# Patient Record
Sex: FEMALE | Race: WHITE | Employment: OTHER | URBAN - METROPOLITAN AREA
[De-identification: names, ages, dates, MRNs, and addresses within clinical notes are randomized per-mention and may not be internally consistent; named-entity substitution may affect disease eponyms.]

---

## 2018-02-15 ENCOUNTER — HOSPITAL ENCOUNTER (OUTPATIENT)
Dept: PREADMISSION TESTING | Age: 53
Discharge: HOME | End: 2018-02-19
Payer: COMMERCIAL

## 2018-02-15 VITALS
TEMPERATURE: 97.9 F | HEART RATE: 92 BPM | WEIGHT: 293 LBS | BODY MASS INDEX: 48.82 KG/M2 | RESPIRATION RATE: 16 BRPM | HEIGHT: 65 IN | DIASTOLIC BLOOD PRESSURE: 79 MMHG | OXYGEN SATURATION: 93 % | SYSTOLIC BLOOD PRESSURE: 149 MMHG

## 2018-02-15 DIAGNOSIS — I10 ESSENTIAL HYPERTENSION: Chronic | ICD-10-CM

## 2018-02-15 DIAGNOSIS — R10.2 PELVIC PAIN IN FEMALE: Chronic | ICD-10-CM

## 2018-02-15 LAB
ABO/RH: NORMAL
ANION GAP SERPL CALCULATED.3IONS-SCNC: 11 MEQ/L (ref 7–13)
ANTIBODY SCREEN: NORMAL
BUN BLDV-MCNC: 9 MG/DL (ref 6–20)
CALCIUM SERPL-MCNC: 9 MG/DL (ref 8.6–10.2)
CHLORIDE BLD-SCNC: 102 MEQ/L (ref 98–107)
CO2: 32 MEQ/L (ref 22–29)
CREAT SERPL-MCNC: 0.8 MG/DL (ref 0.5–0.9)
EKG ATRIAL RATE: 85 BPM
EKG P AXIS: 42 DEGREES
EKG P-R INTERVAL: 160 MS
EKG Q-T INTERVAL: 388 MS
EKG QRS DURATION: 86 MS
EKG QTC CALCULATION (BAZETT): 461 MS
EKG R AXIS: -21 DEGREES
EKG T AXIS: 8 DEGREES
EKG VENTRICULAR RATE: 85 BPM
GFR AFRICAN AMERICAN: >60
GFR NON-AFRICAN AMERICAN: >60
GLUCOSE BLD-MCNC: 82 MG/DL (ref 74–109)
HCT VFR BLD CALC: 49.3 % (ref 37–47)
HEMOGLOBIN: 16 G/DL (ref 12–16)
MCH RBC QN AUTO: 31.2 PG (ref 27–31.3)
MCHC RBC AUTO-ENTMCNC: 32.4 % (ref 33–37)
MCV RBC AUTO: 96.1 FL (ref 82–100)
PDW BLD-RTO: 16.1 % (ref 11.5–14.5)
PLATELET # BLD: 188 K/UL (ref 130–400)
POTASSIUM SERPL-SCNC: 5.1 MEQ/L (ref 3.5–5.1)
RBC # BLD: 5.13 M/UL (ref 4.2–5.4)
SODIUM BLD-SCNC: 145 MEQ/L (ref 132–144)
WBC # BLD: 8.7 K/UL (ref 4.8–10.8)

## 2018-02-15 PROCEDURE — 86850 RBC ANTIBODY SCREEN: CPT

## 2018-02-15 PROCEDURE — 86900 BLOOD TYPING SEROLOGIC ABO: CPT

## 2018-02-15 PROCEDURE — 80048 BASIC METABOLIC PNL TOTAL CA: CPT

## 2018-02-15 PROCEDURE — 86901 BLOOD TYPING SEROLOGIC RH(D): CPT

## 2018-02-15 PROCEDURE — 85027 COMPLETE CBC AUTOMATED: CPT

## 2018-02-15 PROCEDURE — 93005 ELECTROCARDIOGRAM TRACING: CPT

## 2018-02-15 RX ORDER — SODIUM CHLORIDE 0.9 % (FLUSH) 0.9 %
10 SYRINGE (ML) INJECTION PRN
Status: CANCELLED | OUTPATIENT
Start: 2018-02-15

## 2018-02-15 RX ORDER — SODIUM CHLORIDE 0.9 % (FLUSH) 0.9 %
10 SYRINGE (ML) INJECTION EVERY 12 HOURS SCHEDULED
Status: CANCELLED | OUTPATIENT
Start: 2018-02-15

## 2018-02-15 RX ORDER — SODIUM CHLORIDE, SODIUM LACTATE, POTASSIUM CHLORIDE, CALCIUM CHLORIDE 600; 310; 30; 20 MG/100ML; MG/100ML; MG/100ML; MG/100ML
INJECTION, SOLUTION INTRAVENOUS CONTINUOUS
Status: CANCELLED | OUTPATIENT
Start: 2018-02-15

## 2018-02-15 RX ORDER — LIDOCAINE HYDROCHLORIDE 10 MG/ML
1 INJECTION, SOLUTION EPIDURAL; INFILTRATION; INTRACAUDAL; PERINEURAL
Status: CANCELLED | OUTPATIENT
Start: 2018-02-15 | End: 2018-02-15

## 2018-02-15 ASSESSMENT — ENCOUNTER SYMPTOMS
EYES NEGATIVE: 1
DOUBLE VISION: 0
COUGH: 0
BACK PAIN: 0
CONSTIPATION: 0
BLURRED VISION: 0
WHEEZING: 1
HEARTBURN: 0
SORE THROAT: 0
EYE PAIN: 0
STRIDOR: 0
SHORTNESS OF BREATH: 1
DIARRHEA: 0
NAUSEA: 0

## 2018-02-15 NOTE — H&P
difficult to palpate posterior tibial pulses. Dorsalis pedis and radial pulses 2+. Pulmonary/Chest: Effort normal and breath sounds normal. No respiratory distress. Abdominal: Soft. Bowel sounds are normal. There is tenderness (lower left and upper right quadrants). Musculoskeletal: Normal range of motion. She exhibits no edema. Lymphadenopathy:     She has no cervical adenopathy. Neurological: She is alert and oriented to person, place, and time. Skin: Skin is warm and dry. No erythema. Psychiatric: She has a normal mood and affect. Her behavior is normal.   Vitals reviewed.       Assessment:  Pelvic pain    Plan:  Excision of vaginal mesh, cystoscopy    Makayla Victor NP  2/15/2018

## 2018-02-16 PROCEDURE — 93010 ELECTROCARDIOGRAM REPORT: CPT | Performed by: INTERNAL MEDICINE

## 2018-02-19 ENCOUNTER — ANESTHESIA (OUTPATIENT)
Dept: OPERATING ROOM | Age: 53
End: 2018-02-19
Payer: COMMERCIAL

## 2018-02-19 ENCOUNTER — HOSPITAL ENCOUNTER (OUTPATIENT)
Age: 53
Setting detail: OUTPATIENT SURGERY
Discharge: HOME | End: 2018-02-19
Attending: OBSTETRICS & GYNECOLOGY | Admitting: OBSTETRICS & GYNECOLOGY
Payer: COMMERCIAL

## 2018-02-19 ENCOUNTER — ANESTHESIA EVENT (OUTPATIENT)
Dept: OPERATING ROOM | Age: 53
End: 2018-02-19
Payer: COMMERCIAL

## 2018-02-19 VITALS
TEMPERATURE: 97 F | DIASTOLIC BLOOD PRESSURE: 72 MMHG | RESPIRATION RATE: 20 BRPM | SYSTOLIC BLOOD PRESSURE: 139 MMHG | OXYGEN SATURATION: 97 % | HEART RATE: 88 BPM

## 2018-02-19 VITALS — SYSTOLIC BLOOD PRESSURE: 183 MMHG | TEMPERATURE: 97.3 F | DIASTOLIC BLOOD PRESSURE: 104 MMHG | OXYGEN SATURATION: 93 %

## 2018-02-19 DIAGNOSIS — G89.18 POSTOPERATIVE PAIN: Primary | ICD-10-CM

## 2018-02-19 PROCEDURE — 6360000002 HC RX W HCPCS: Performed by: NURSE ANESTHETIST, CERTIFIED REGISTERED

## 2018-02-19 PROCEDURE — 3600000014 HC SURGERY LEVEL 4 ADDTL 15MIN: Performed by: OBSTETRICS & GYNECOLOGY

## 2018-02-19 PROCEDURE — 3600000004 HC SURGERY LEVEL 4 BASE: Performed by: OBSTETRICS & GYNECOLOGY

## 2018-02-19 PROCEDURE — 88305 TISSUE EXAM BY PATHOLOGIST: CPT

## 2018-02-19 PROCEDURE — 2500000003 HC RX 250 WO HCPCS: Performed by: NURSE ANESTHETIST, CERTIFIED REGISTERED

## 2018-02-19 PROCEDURE — 7100000010 HC PHASE II RECOVERY - FIRST 15 MIN: Performed by: OBSTETRICS & GYNECOLOGY

## 2018-02-19 PROCEDURE — 3700000000 HC ANESTHESIA ATTENDED CARE: Performed by: OBSTETRICS & GYNECOLOGY

## 2018-02-19 PROCEDURE — 2580000003 HC RX 258: Performed by: NURSE ANESTHETIST, CERTIFIED REGISTERED

## 2018-02-19 PROCEDURE — 3700000001 HC ADD 15 MINUTES (ANESTHESIA): Performed by: OBSTETRICS & GYNECOLOGY

## 2018-02-19 PROCEDURE — 2500000003 HC RX 250 WO HCPCS: Performed by: STUDENT IN AN ORGANIZED HEALTH CARE EDUCATION/TRAINING PROGRAM

## 2018-02-19 PROCEDURE — 7100000000 HC PACU RECOVERY - FIRST 15 MIN: Performed by: OBSTETRICS & GYNECOLOGY

## 2018-02-19 PROCEDURE — 6360000002 HC RX W HCPCS: Performed by: OBSTETRICS & GYNECOLOGY

## 2018-02-19 PROCEDURE — 2580000003 HC RX 258: Performed by: STUDENT IN AN ORGANIZED HEALTH CARE EDUCATION/TRAINING PROGRAM

## 2018-02-19 PROCEDURE — 7100000011 HC PHASE II RECOVERY - ADDTL 15 MIN: Performed by: OBSTETRICS & GYNECOLOGY

## 2018-02-19 PROCEDURE — 2580000003 HC RX 258: Performed by: OBSTETRICS & GYNECOLOGY

## 2018-02-19 PROCEDURE — 6370000000 HC RX 637 (ALT 250 FOR IP): Performed by: OBSTETRICS & GYNECOLOGY

## 2018-02-19 PROCEDURE — 7100000001 HC PACU RECOVERY - ADDTL 15 MIN: Performed by: OBSTETRICS & GYNECOLOGY

## 2018-02-19 PROCEDURE — 6370000000 HC RX 637 (ALT 250 FOR IP): Performed by: NURSE ANESTHETIST, CERTIFIED REGISTERED

## 2018-02-19 PROCEDURE — 2780000010 HC IMPLANT OTHER: Performed by: OBSTETRICS & GYNECOLOGY

## 2018-02-19 RX ORDER — SODIUM CHLORIDE, SODIUM LACTATE, POTASSIUM CHLORIDE, CALCIUM CHLORIDE 600; 310; 30; 20 MG/100ML; MG/100ML; MG/100ML; MG/100ML
INJECTION, SOLUTION INTRAVENOUS CONTINUOUS
Status: DISCONTINUED | OUTPATIENT
Start: 2018-02-19 | End: 2018-02-19 | Stop reason: HOSPADM

## 2018-02-19 RX ORDER — LIDOCAINE HYDROCHLORIDE 10 MG/ML
1 INJECTION, SOLUTION EPIDURAL; INFILTRATION; INTRACAUDAL; PERINEURAL
Status: DISCONTINUED | OUTPATIENT
Start: 2018-02-19 | End: 2018-02-19 | Stop reason: HOSPADM

## 2018-02-19 RX ORDER — FENTANYL CITRATE 50 UG/ML
INJECTION, SOLUTION INTRAMUSCULAR; INTRAVENOUS PRN
Status: DISCONTINUED | OUTPATIENT
Start: 2018-02-19 | End: 2018-02-19 | Stop reason: SDUPTHER

## 2018-02-19 RX ORDER — ROCURONIUM BROMIDE 10 MG/ML
INJECTION, SOLUTION INTRAVENOUS PRN
Status: DISCONTINUED | OUTPATIENT
Start: 2018-02-19 | End: 2018-02-19

## 2018-02-19 RX ORDER — ONDANSETRON 2 MG/ML
4 INJECTION INTRAMUSCULAR; INTRAVENOUS
Status: DISCONTINUED | OUTPATIENT
Start: 2018-02-19 | End: 2018-02-19 | Stop reason: HOSPADM

## 2018-02-19 RX ORDER — SODIUM CHLORIDE 0.9 % (FLUSH) 0.9 %
10 SYRINGE (ML) INJECTION EVERY 12 HOURS SCHEDULED
Status: DISCONTINUED | OUTPATIENT
Start: 2018-02-19 | End: 2018-02-19 | Stop reason: HOSPADM

## 2018-02-19 RX ORDER — ONDANSETRON 2 MG/ML
INJECTION INTRAMUSCULAR; INTRAVENOUS PRN
Status: DISCONTINUED | OUTPATIENT
Start: 2018-02-19 | End: 2018-02-19 | Stop reason: SDUPTHER

## 2018-02-19 RX ORDER — MEPERIDINE HYDROCHLORIDE 25 MG/ML
12.5 INJECTION INTRAMUSCULAR; INTRAVENOUS; SUBCUTANEOUS EVERY 5 MIN PRN
Status: DISCONTINUED | OUTPATIENT
Start: 2018-02-19 | End: 2018-02-19 | Stop reason: HOSPADM

## 2018-02-19 RX ORDER — DOCUSATE SODIUM 100 MG/1
100 CAPSULE, LIQUID FILLED ORAL 2 TIMES DAILY
Status: DISCONTINUED | OUTPATIENT
Start: 2018-02-19 | End: 2018-02-19 | Stop reason: HOSPADM

## 2018-02-19 RX ORDER — KETOROLAC TROMETHAMINE 30 MG/ML
30 INJECTION, SOLUTION INTRAMUSCULAR; INTRAVENOUS EVERY 6 HOURS
Status: DISCONTINUED | OUTPATIENT
Start: 2018-02-19 | End: 2018-02-19 | Stop reason: HOSPADM

## 2018-02-19 RX ORDER — ONDANSETRON 2 MG/ML
4 INJECTION INTRAMUSCULAR; INTRAVENOUS EVERY 8 HOURS PRN
Status: DISCONTINUED | OUTPATIENT
Start: 2018-02-19 | End: 2018-02-19 | Stop reason: HOSPADM

## 2018-02-19 RX ORDER — DEXAMETHASONE SODIUM PHOSPHATE 10 MG/ML
INJECTION INTRAMUSCULAR; INTRAVENOUS PRN
Status: DISCONTINUED | OUTPATIENT
Start: 2018-02-19 | End: 2018-02-19 | Stop reason: SDUPTHER

## 2018-02-19 RX ORDER — FENTANYL CITRATE 50 UG/ML
50 INJECTION, SOLUTION INTRAMUSCULAR; INTRAVENOUS EVERY 10 MIN PRN
Status: DISCONTINUED | OUTPATIENT
Start: 2018-02-19 | End: 2018-02-19 | Stop reason: HOSPADM

## 2018-02-19 RX ORDER — MAGNESIUM HYDROXIDE 1200 MG/15ML
LIQUID ORAL CONTINUOUS PRN
Status: DISCONTINUED | OUTPATIENT
Start: 2018-02-19 | End: 2018-02-19 | Stop reason: HOSPADM

## 2018-02-19 RX ORDER — HYDROCODONE BITARTRATE AND ACETAMINOPHEN 5; 325 MG/1; MG/1
1 TABLET ORAL PRN
Status: DISCONTINUED | OUTPATIENT
Start: 2018-02-19 | End: 2018-02-19 | Stop reason: HOSPADM

## 2018-02-19 RX ORDER — SODIUM CHLORIDE 0.9 % (FLUSH) 0.9 %
10 SYRINGE (ML) INJECTION PRN
Status: DISCONTINUED | OUTPATIENT
Start: 2018-02-19 | End: 2018-02-19 | Stop reason: HOSPADM

## 2018-02-19 RX ORDER — ALBUTEROL SULFATE 90 UG/1
AEROSOL, METERED RESPIRATORY (INHALATION) PRN
Status: DISCONTINUED | OUTPATIENT
Start: 2018-02-19 | End: 2018-02-19 | Stop reason: SDUPTHER

## 2018-02-19 RX ORDER — MIDAZOLAM HYDROCHLORIDE 1 MG/ML
INJECTION INTRAMUSCULAR; INTRAVENOUS PRN
Status: DISCONTINUED | OUTPATIENT
Start: 2018-02-19 | End: 2018-02-19 | Stop reason: SDUPTHER

## 2018-02-19 RX ORDER — HYDROCODONE BITARTRATE AND ACETAMINOPHEN 5; 325 MG/1; MG/1
2 TABLET ORAL PRN
Status: DISCONTINUED | OUTPATIENT
Start: 2018-02-19 | End: 2018-02-19 | Stop reason: HOSPADM

## 2018-02-19 RX ORDER — LIDOCAINE HYDROCHLORIDE 10 MG/ML
1 INJECTION, SOLUTION EPIDURAL; INFILTRATION; INTRACAUDAL; PERINEURAL
Status: COMPLETED | OUTPATIENT
Start: 2018-02-19 | End: 2018-02-19

## 2018-02-19 RX ORDER — ROCURONIUM BROMIDE 10 MG/ML
INJECTION, SOLUTION INTRAVENOUS PRN
Status: DISCONTINUED | OUTPATIENT
Start: 2018-02-19 | End: 2018-02-19 | Stop reason: SDUPTHER

## 2018-02-19 RX ORDER — DIPHENHYDRAMINE HYDROCHLORIDE 50 MG/ML
12.5 INJECTION INTRAMUSCULAR; INTRAVENOUS
Status: DISCONTINUED | OUTPATIENT
Start: 2018-02-19 | End: 2018-02-19 | Stop reason: HOSPADM

## 2018-02-19 RX ORDER — OXYCODONE HYDROCHLORIDE AND ACETAMINOPHEN 5; 325 MG/1; MG/1
1 TABLET ORAL EVERY 6 HOURS PRN
Qty: 12 TABLET | Refills: 0 | Status: SHIPPED | OUTPATIENT
Start: 2018-02-19 | End: 2022-02-24 | Stop reason: SDUPTHER

## 2018-02-19 RX ORDER — IBUPROFEN 400 MG/1
400 TABLET ORAL EVERY 6 HOURS PRN
Status: DISCONTINUED | OUTPATIENT
Start: 2018-02-19 | End: 2018-02-19 | Stop reason: HOSPADM

## 2018-02-19 RX ORDER — SUCCINYLCHOLINE/SOD CL,ISO/PF 100 MG/5ML
SYRINGE (ML) INTRAVENOUS PRN
Status: DISCONTINUED | OUTPATIENT
Start: 2018-02-19 | End: 2018-02-19 | Stop reason: SDUPTHER

## 2018-02-19 RX ORDER — SODIUM CHLORIDE, SODIUM LACTATE, POTASSIUM CHLORIDE, CALCIUM CHLORIDE 600; 310; 30; 20 MG/100ML; MG/100ML; MG/100ML; MG/100ML
INJECTION, SOLUTION INTRAVENOUS CONTINUOUS PRN
Status: DISCONTINUED | OUTPATIENT
Start: 2018-02-19 | End: 2018-02-19 | Stop reason: SDUPTHER

## 2018-02-19 RX ORDER — METOCLOPRAMIDE HYDROCHLORIDE 5 MG/ML
10 INJECTION INTRAMUSCULAR; INTRAVENOUS
Status: DISCONTINUED | OUTPATIENT
Start: 2018-02-19 | End: 2018-02-19 | Stop reason: HOSPADM

## 2018-02-19 RX ORDER — MAGNESIUM HYDROXIDE 1200 MG/15ML
LIQUID ORAL PRN
Status: DISCONTINUED | OUTPATIENT
Start: 2018-02-19 | End: 2018-02-19 | Stop reason: HOSPADM

## 2018-02-19 RX ORDER — PROPOFOL 10 MG/ML
INJECTION, EMULSION INTRAVENOUS PRN
Status: DISCONTINUED | OUTPATIENT
Start: 2018-02-19 | End: 2018-02-19 | Stop reason: SDUPTHER

## 2018-02-19 RX ADMIN — FENTANYL CITRATE 100 MCG: 50 INJECTION, SOLUTION INTRAMUSCULAR; INTRAVENOUS at 08:19

## 2018-02-19 RX ADMIN — SODIUM CHLORIDE, POTASSIUM CHLORIDE, SODIUM LACTATE AND CALCIUM CHLORIDE: 600; 310; 30; 20 INJECTION, SOLUTION INTRAVENOUS at 06:36

## 2018-02-19 RX ADMIN — ONDANSETRON 4 MG: 2 INJECTION INTRAMUSCULAR; INTRAVENOUS at 08:45

## 2018-02-19 RX ADMIN — PROPOFOL 200 MG: 10 INJECTION, EMULSION INTRAVENOUS at 08:19

## 2018-02-19 RX ADMIN — Medication 180 MG: at 08:19

## 2018-02-19 RX ADMIN — MIDAZOLAM HYDROCHLORIDE 0.5 MG: 1 INJECTION, SOLUTION INTRAMUSCULAR; INTRAVENOUS at 08:10

## 2018-02-19 RX ADMIN — DEXAMETHASONE SODIUM PHOSPHATE 5 MG: 10 INJECTION INTRAMUSCULAR; INTRAVENOUS at 08:25

## 2018-02-19 RX ADMIN — LIDOCAINE HYDROCHLORIDE 100 MG: 20 INJECTION, SOLUTION INTRAVENOUS at 08:19

## 2018-02-19 RX ADMIN — Medication 3 G: at 08:15

## 2018-02-19 RX ADMIN — MIDAZOLAM HYDROCHLORIDE 1 MG: 1 INJECTION, SOLUTION INTRAMUSCULAR; INTRAVENOUS at 07:28

## 2018-02-19 RX ADMIN — ROCURONIUM BROMIDE 10 MG: 10 INJECTION INTRAVENOUS at 08:33

## 2018-02-19 RX ADMIN — MIDAZOLAM HYDROCHLORIDE 0.5 MG: 1 INJECTION, SOLUTION INTRAMUSCULAR; INTRAVENOUS at 07:55

## 2018-02-19 RX ADMIN — ALBUTEROL SULFATE 6 PUFF: 90 AEROSOL, METERED RESPIRATORY (INHALATION) at 08:31

## 2018-02-19 RX ADMIN — PHENYLEPHRINE HYDROCHLORIDE 100 MCG: 10 INJECTION INTRAVENOUS at 08:42

## 2018-02-19 RX ADMIN — SODIUM CHLORIDE, POTASSIUM CHLORIDE, SODIUM LACTATE AND CALCIUM CHLORIDE: 600; 310; 30; 20 INJECTION, SOLUTION INTRAVENOUS at 07:14

## 2018-02-19 ASSESSMENT — PULMONARY FUNCTION TESTS
PIF_VALUE: 35
PIF_VALUE: 40
PIF_VALUE: 40
PIF_VALUE: 1
PIF_VALUE: 35
PIF_VALUE: 2
PIF_VALUE: 40
PIF_VALUE: 35
PIF_VALUE: 40
PIF_VALUE: 40
PIF_VALUE: 14
PIF_VALUE: 35
PIF_VALUE: 1
PIF_VALUE: 35
PIF_VALUE: 17
PIF_VALUE: 42
PIF_VALUE: 35
PIF_VALUE: 40
PIF_VALUE: 2
PIF_VALUE: 40
PIF_VALUE: 17
PIF_VALUE: 6
PIF_VALUE: 40
PIF_VALUE: 35
PIF_VALUE: 17
PIF_VALUE: 1
PIF_VALUE: 39
PIF_VALUE: 1
PIF_VALUE: 4
PIF_VALUE: 1
PIF_VALUE: 40
PIF_VALUE: 7
PIF_VALUE: 40
PIF_VALUE: 40
PIF_VALUE: 17
PIF_VALUE: 40
PIF_VALUE: 8
PIF_VALUE: 27
PIF_VALUE: 40
PIF_VALUE: 39
PIF_VALUE: 28
PIF_VALUE: 40
PIF_VALUE: 2
PIF_VALUE: 35
PIF_VALUE: 5
PIF_VALUE: 35
PIF_VALUE: 17
PIF_VALUE: 3
PIF_VALUE: 18
PIF_VALUE: 40
PIF_VALUE: 8
PIF_VALUE: 2
PIF_VALUE: 17
PIF_VALUE: 17
PIF_VALUE: 4

## 2018-02-19 ASSESSMENT — PAIN SCALES - GENERAL
PAINLEVEL_OUTOF10: 0
PAINLEVEL_OUTOF10: 0

## 2018-02-19 ASSESSMENT — COPD QUESTIONNAIRES: CAT_SEVERITY: NO INTERVAL CHANGE

## 2018-02-19 ASSESSMENT — PAIN DESCRIPTION - PAIN TYPE: TYPE: REFERRED PAIN

## 2018-02-19 ASSESSMENT — PAIN - FUNCTIONAL ASSESSMENT: PAIN_FUNCTIONAL_ASSESSMENT: 0-10

## 2018-02-19 NOTE — OP NOTE
a normal  bladder. No evidence of any injury. The patient was allowed to emerge  from anesthesia and was taken to the PACU in good condition. She tolerated  the surgery and the anesthesia well. Blood loss minimal.  Complications  none.       Jannie Mobley DO    D: 02/19/2018 9:44:48       T: 02/19/2018 11:48:16     [email or web address]  Job#: [id number]     Doc#: [id number]    CC:

## 2018-02-19 NOTE — PROGRESS NOTES
Received from or into pacu on a cart accompanied in by Efren Bryant crna. O2 on at 8L mask, SAO2 90%. Expiratory wheeze noted to anterior auscultation. Pt encouraged to take deep breaths and cough. Skin pink, warm and dry. MP RSR. Abdomen soft, clean peripad placed.

## 2018-02-19 NOTE — PROGRESS NOTES
Pt tolerating po ice chips. Faint expiratory wheeze auscultated anteriorly, otherwise breath sounds clear following pt's dry cough. Continued encouragement to deep breathe provided. Pt instructed to use incentive spirometer, attaining inspored volume of 500cc.

## 2018-02-19 NOTE — PROGRESS NOTES
Abdomen soft, peripad clean. SAO2 96% on room air. Pt using incentive spirometer. Denies dyspnea or pain Tolerating ice chips.

## 2018-02-19 NOTE — ANESTHESIA PRE PROCEDURE
I10       Past Medical History:        Diagnosis Date    Anxiety     Asthma     [facility]     Chronic back pain     COPD [facility]ch[facility] obstructive pulmonary disease) [facility]     Depression     Hypertension     Obesity     Pelvic pain in female 2/15/2018       Past Surgical History:        Procedure Laterality Date    BLADDER SUSPENSION  02/2014    CARPAL TUNNEL RELEASE  89 or 90    right hand     HYSTERECTOMY  2009    full    TUBAL LIGATION      27 yrs ago       Social History:    Social History   Substance Use Topics    Smoking status: Current Every Day Smoker     Packs/day: 1.00     Types: Cigarettes     Start date: 5/6/1979    Smokeless tobacco: Never Used    Alcohol use No                                Ready to quit: Not Answered  Counseling given: Not Answered      Vital Signs (Current):   Vitals:    02/19/18 0613   BP: 136/82   Pulse: 93   Resp: 20   Temp: 98.8 °F (37.1 °C)   TempSrc: Temporal                                              BP Readings from Last 3 Encounters:   02/19/18 136/82   02/15/18 (!) 149/79   03/26/14 130/90       NPO Status: Time of last liquid consumption: 2330                        Time of last solid consumption: 2200                        Date of last liquid consumption: 02/18/18                        Date of last solid food consumption: 02/18/18    BMI:   Wt Readings from Last 3 Encounters:   02/15/18 (!) 341 lb 6.4 oz (154.9 kg)   03/26/14 (!) 308 lb (139.7 kg)   01/23/14 (!) 310 lb 3.2 oz (140.7 kg)     There is no height or weight on file to calculate BMI.    CBC:   Lab Results   Component Value Date    WBC 8.7 02/15/2018    RBC 5.13 02/15/2018    HGB 16.0 02/15/2018    HCT 49.3 02/15/2018    MCV 96.1 02/15/2018    RDW 16.1 02/15/2018     02/15/2018       CMP:   Lab Results   Component Value Date     02/15/2018    K 5.1 02/15/2018     02/15/2018    CO2 32 02/15/2018    BUN 9 02/15/2018    CREATININE 0.80 02/15/2018

## 2018-12-04 ENCOUNTER — APPOINTMENT (OUTPATIENT)
Dept: GENERAL RADIOLOGY | Age: 53
End: 2018-12-04
Payer: MEDICARE

## 2018-12-04 ENCOUNTER — HOSPITAL ENCOUNTER (EMERGENCY)
Age: 53
Discharge: HOME | End: 2018-12-04
Attending: EMERGENCY MEDICINE
Payer: MEDICARE

## 2018-12-04 VITALS
DIASTOLIC BLOOD PRESSURE: 92 MMHG | WEIGHT: 293 LBS | HEART RATE: 89 BPM | RESPIRATION RATE: 18 BRPM | OXYGEN SATURATION: 95 % | BODY MASS INDEX: 48.82 KG/M2 | TEMPERATURE: 97.5 F | HEIGHT: 65 IN | SYSTOLIC BLOOD PRESSURE: 162 MMHG

## 2018-12-04 DIAGNOSIS — J18.9 COMMUNITY ACQUIRED PNEUMONIA OF LEFT LUNG, UNSPECIFIED PART OF LUNG: Primary | ICD-10-CM

## 2018-12-04 PROCEDURE — 71045 X-RAY EXAM CHEST 1 VIEW: CPT

## 2018-12-04 PROCEDURE — 99283 EMERGENCY DEPT VISIT LOW MDM: CPT

## 2018-12-04 PROCEDURE — 96372 THER/PROPH/DIAG INJ SC/IM: CPT

## 2018-12-04 PROCEDURE — 6370000000 HC RX 637 (ALT 250 FOR IP): Performed by: EMERGENCY MEDICINE

## 2018-12-04 PROCEDURE — 6360000002 HC RX W HCPCS: Performed by: EMERGENCY MEDICINE

## 2018-12-04 RX ORDER — CEFTRIAXONE 500 MG/1
500 INJECTION, POWDER, FOR SOLUTION INTRAMUSCULAR; INTRAVENOUS ONCE
Status: COMPLETED | OUTPATIENT
Start: 2018-12-04 | End: 2018-12-04

## 2018-12-04 RX ORDER — PREDNISONE 50 MG/1
50 TABLET ORAL DAILY
Qty: 4 TABLET | Refills: 0 | Status: SHIPPED | OUTPATIENT
Start: 2018-12-04 | End: 2018-12-08

## 2018-12-04 RX ORDER — GUAIFENESIN AND CODEINE PHOSPHATE 100; 10 MG/5ML; MG/5ML
10 SOLUTION ORAL EVERY 6 HOURS PRN
Qty: 120 ML | Refills: 0 | Status: SHIPPED | OUTPATIENT
Start: 2018-12-04 | End: 2018-12-11

## 2018-12-04 RX ORDER — KETOROLAC TROMETHAMINE 30 MG/ML
30 INJECTION, SOLUTION INTRAMUSCULAR; INTRAVENOUS ONCE
Status: COMPLETED | OUTPATIENT
Start: 2018-12-04 | End: 2018-12-04

## 2018-12-04 RX ORDER — AZITHROMYCIN 250 MG/1
TABLET, FILM COATED ORAL
Qty: 1 PACKET | Refills: 0 | Status: SHIPPED | OUTPATIENT
Start: 2018-12-04 | End: 2020-01-24

## 2018-12-04 RX ORDER — PREDNISONE 20 MG/1
60 TABLET ORAL ONCE
Status: COMPLETED | OUTPATIENT
Start: 2018-12-04 | End: 2018-12-04

## 2018-12-04 RX ADMIN — KETOROLAC TROMETHAMINE 30 MG: 30 INJECTION, SOLUTION INTRAMUSCULAR at 18:26

## 2018-12-04 RX ADMIN — CEFTRIAXONE SODIUM 500 MG: 500 INJECTION, POWDER, FOR SOLUTION INTRAMUSCULAR; INTRAVENOUS at 19:02

## 2018-12-04 RX ADMIN — PREDNISONE 60 MG: 20 TABLET ORAL at 18:25

## 2018-12-04 ASSESSMENT — PAIN DESCRIPTION - PAIN TYPE: TYPE: ACUTE PAIN

## 2018-12-04 ASSESSMENT — ENCOUNTER SYMPTOMS
COUGH: 1
VOMITING: 0
SHORTNESS OF BREATH: 0

## 2018-12-04 ASSESSMENT — PAIN DESCRIPTION - DESCRIPTORS: DESCRIPTORS: ACHING

## 2018-12-04 ASSESSMENT — PAIN SCALES - GENERAL: PAINLEVEL_OUTOF10: 8

## 2018-12-04 ASSESSMENT — PAIN DESCRIPTION - LOCATION: LOCATION: RIB CAGE

## 2018-12-04 ASSESSMENT — PAIN DESCRIPTION - ORIENTATION: ORIENTATION: LEFT

## 2020-01-24 ENCOUNTER — OFFICE VISIT (OUTPATIENT)
Dept: FAMILY MEDICINE CLINIC | Age: 55
End: 2020-01-24
Payer: MEDICARE

## 2020-01-24 VITALS
OXYGEN SATURATION: 92 % | TEMPERATURE: 98.1 F | SYSTOLIC BLOOD PRESSURE: 114 MMHG | HEIGHT: 65 IN | DIASTOLIC BLOOD PRESSURE: 76 MMHG | HEART RATE: 88 BPM | BODY MASS INDEX: 48.82 KG/M2 | RESPIRATION RATE: 16 BRPM | WEIGHT: 293 LBS

## 2020-01-24 DIAGNOSIS — E68 SEQUELAE OF HYPERALIMENTATION: ICD-10-CM

## 2020-01-24 DIAGNOSIS — R06.02 SOB (SHORTNESS OF BREATH) ON EXERTION: ICD-10-CM

## 2020-01-24 DIAGNOSIS — I10 ESSENTIAL HYPERTENSION: Chronic | ICD-10-CM

## 2020-01-24 DIAGNOSIS — L29.9 PRURITUS, UNSPECIFIED: ICD-10-CM

## 2020-01-24 DIAGNOSIS — R63.5 WEIGHT GAIN: ICD-10-CM

## 2020-01-24 DIAGNOSIS — I25.10 CORONARY ARTERY DISEASE INVOLVING NATIVE CORONARY ARTERY OF NATIVE HEART, ANGINA PRESENCE UNSPECIFIED: ICD-10-CM

## 2020-01-24 DIAGNOSIS — I51.7 LVH (LEFT VENTRICULAR HYPERTROPHY): ICD-10-CM

## 2020-01-24 DIAGNOSIS — R79.9 ABNORMAL FINDING OF BLOOD CHEMISTRY, UNSPECIFIED: ICD-10-CM

## 2020-01-24 LAB
ALBUMIN SERPL-MCNC: 4 G/DL (ref 3.5–4.6)
ALP BLD-CCNC: 116 U/L (ref 40–130)
ALT SERPL-CCNC: 11 U/L (ref 0–33)
ANION GAP SERPL CALCULATED.3IONS-SCNC: 13 MEQ/L (ref 9–15)
AST SERPL-CCNC: 12 U/L (ref 0–35)
BILIRUB SERPL-MCNC: 0.4 MG/DL (ref 0.2–0.7)
BUN BLDV-MCNC: 11 MG/DL (ref 6–20)
CALCIUM SERPL-MCNC: 9.1 MG/DL (ref 8.5–9.9)
CHLORIDE BLD-SCNC: 99 MEQ/L (ref 95–107)
CHOLESTEROL, TOTAL: 181 MG/DL (ref 0–199)
CO2: 29 MEQ/L (ref 20–31)
CREAT SERPL-MCNC: 0.94 MG/DL (ref 0.5–0.9)
GFR AFRICAN AMERICAN: >60
GFR NON-AFRICAN AMERICAN: >60
GLOBULIN: 3.3 G/DL (ref 2.3–3.5)
GLUCOSE BLD-MCNC: 83 MG/DL (ref 70–99)
HBA1C MFR BLD: 5.6 % (ref 4.8–5.9)
HCT VFR BLD CALC: 49.3 % (ref 37–47)
HDLC SERPL-MCNC: 54 MG/DL (ref 40–59)
HEMOGLOBIN: 16.1 G/DL (ref 12–16)
LDL CHOLESTEROL CALCULATED: 99 MG/DL (ref 0–129)
MCH RBC QN AUTO: 31.1 PG (ref 27–31.3)
MCHC RBC AUTO-ENTMCNC: 32.6 % (ref 33–37)
MCV RBC AUTO: 95.3 FL (ref 82–100)
PDW BLD-RTO: 16.4 % (ref 11.5–14.5)
PLATELET # BLD: 221 K/UL (ref 130–400)
POTASSIUM SERPL-SCNC: 4.4 MEQ/L (ref 3.4–4.9)
PRO-BNP: 31 PG/ML
RBC # BLD: 5.17 M/UL (ref 4.2–5.4)
SODIUM BLD-SCNC: 141 MEQ/L (ref 135–144)
TOTAL PROTEIN: 7.3 G/DL (ref 6.3–8)
TRIGL SERPL-MCNC: 141 MG/DL (ref 0–150)
TSH REFLEX: 3.15 UIU/ML (ref 0.44–3.86)
VITAMIN D 25-HYDROXY: 21.7 NG/ML (ref 30–100)
WBC # BLD: 8.1 K/UL (ref 4.8–10.8)

## 2020-01-24 PROCEDURE — 99204 OFFICE O/P NEW MOD 45 MIN: CPT | Performed by: PHYSICIAN ASSISTANT

## 2020-01-24 PROCEDURE — G8431 POS CLIN DEPRES SCRN F/U DOC: HCPCS | Performed by: PHYSICIAN ASSISTANT

## 2020-01-24 PROCEDURE — 93000 ELECTROCARDIOGRAM COMPLETE: CPT | Performed by: PHYSICIAN ASSISTANT

## 2020-01-24 PROCEDURE — G0444 DEPRESSION SCREEN ANNUAL: HCPCS | Performed by: PHYSICIAN ASSISTANT

## 2020-01-24 RX ORDER — NYSTATIN 100000 [USP'U]/G
POWDER TOPICAL
Qty: 60 G | Refills: 2 | Status: SHIPPED | OUTPATIENT
Start: 2020-01-24 | End: 2020-06-19 | Stop reason: SDUPTHER

## 2020-01-24 RX ORDER — NYSTATIN 100000 [USP'U]/G
POWDER TOPICAL
Qty: 60 G | Refills: 2 | Status: SHIPPED | OUTPATIENT
Start: 2020-01-24 | End: 2020-01-24 | Stop reason: SDUPTHER

## 2020-01-24 ASSESSMENT — PATIENT HEALTH QUESTIONNAIRE - PHQ9
SUM OF ALL RESPONSES TO PHQ QUESTIONS 1-9: 12
1. LITTLE INTEREST OR PLEASURE IN DOING THINGS: 1
9. THOUGHTS THAT YOU WOULD BE BETTER OFF DEAD, OR OF HURTING YOURSELF: 0
8. MOVING OR SPEAKING SO SLOWLY THAT OTHER PEOPLE COULD HAVE NOTICED. OR THE OPPOSITE, BEING SO FIGETY OR RESTLESS THAT YOU HAVE BEEN MOVING AROUND A LOT MORE THAN USUAL: 0
SUM OF ALL RESPONSES TO PHQ9 QUESTIONS 1 & 2: 4
6. FEELING BAD ABOUT YOURSELF - OR THAT YOU ARE A FAILURE OR HAVE LET YOURSELF OR YOUR FAMILY DOWN: 1
5. POOR APPETITE OR OVEREATING: 1
SUM OF ALL RESPONSES TO PHQ QUESTIONS 1-9: 12
3. TROUBLE FALLING OR STAYING ASLEEP: 2
4. FEELING TIRED OR HAVING LITTLE ENERGY: 3
10. IF YOU CHECKED OFF ANY PROBLEMS, HOW DIFFICULT HAVE THESE PROBLEMS MADE IT FOR YOU TO DO YOUR WORK, TAKE CARE OF THINGS AT HOME, OR GET ALONG WITH OTHER PEOPLE: 1
2. FEELING DOWN, DEPRESSED OR HOPELESS: 3
7. TROUBLE CONCENTRATING ON THINGS, SUCH AS READING THE NEWSPAPER OR WATCHING TELEVISION: 1

## 2020-01-24 NOTE — PROGRESS NOTES
Subjective  Mckenzie Chowdary, 49 y.o. female presents today with:  Chief Complaint   Patient presents with   [employer] Establish Care    Leg Swelling     2 weeks, a little better today     Rash     1 week  under breasts      HPI  In the office to establish care. Previous PCP: [facility]; Dr. Alin Lopez    COPD. No inhalers at this time. Increased cough/SOB, +WHITE. Tobacco: 1.5 PPD x 30+ year history. Quit twice in the past. Interested in quitting. Obesity. Was set to have gastric bypass; [facility]. Surgery was delayed/cancelled due to acutely ill son with special needs. Never followed up; did not reschedule. Would eventually like to be reevaluated. CAD/HTN/CHF. Previous cardiologist: Dr. Georges Fischer. Has not since followed up with cardiology. +CP, +WHITE, +SOB, +weight gain, +BLE. Anxiety and depression. Acute on chronic. Long history of anxiety/depression. Reports worsening depression recently due to current living situation. Boyfriend is living with patient and her adoptive son. Feels like her boyfriend is living there for \"convinence\". Rash. Chronic, recurrent rash of breast folds. +moist, erythema, pruritis. Has not applied anything to skin. Review of Systems   Constitutional: Positive for activity change, fatigue and unexpected weight change (up and down). Negative for appetite change, chills, diaphoresis and fever. HENT: Positive for postnasal drip. Negative for congestion, ear pain, tinnitus and trouble swallowing. Eyes: Positive for visual disturbance. Respiratory: Positive for cough, chest tightness and shortness of breath. Negative for apnea, choking and wheezing. Cardiovascular: Positive for leg swelling. Negative for chest pain and palpitations. Gastrointestinal: Positive for constipation. Negative for blood in stool and nausea. Endocrine: Positive for polyuria. Negative for polydipsia and polyphagia. Genitourinary: Negative for pelvic pain.    Musculoskeletal: service: Not on file     Active member of club or organization: Not on file     Attends meetings of clubs or organizations: Not on file     Relationship status: Not on file    Intimate partner violence:     Fear of current or ex partner: Not on file     Emotionally abused: Not on file     Physically abused: Not on file     Forced sexual activity: Not on file   Other Topics Concern    Not on file   Social History Narrative    Not on file     Family History   Problem Relation Age of Onset    Alzheimer's Disease Father     High Blood Pressure Father     Stroke Father         11 strokes in one year    Asthma Sister     Asthma Brother     No Known Problems Daughter     No Known Problems Daughter         Allergies   Allergen Reactions    Food Hives     Patient still eats strawberries    Other Rash     grass     Current Outpatient Medications   Medication Sig Dispense Refill    nystatin (MYCOSTATIN) 032527 UNIT/GM powder Apply 3 times daily. 60 g 2     No current facility-administered medications for this visit. PMH, Surgical Hx, Family Hx, and Social Hx reviewed and updated. Health Maintenance reviewed. Objective  Vitals:    01/24/20 1308   BP: 114/76   Site: Left Upper Arm   Position: Sitting   Cuff Size: Large Adult   Pulse: 88   Resp: 16   Temp: 98.1 °F (36.7 °C)   TempSrc: Tympanic   SpO2: 92%   Weight: (!) 341 lb 6.4 oz (154.9 kg)   Height: 5' 5\" (1.651 m)     BP Readings from Last 3 Encounters:   01/24/20 114/76   12/04/18 (!) 162/92   02/15/18 (!) 149/79     Wt Readings from Last 3 Encounters:   01/24/20 (!) 341 lb 6.4 oz (154.9 kg)   12/04/18 (!) 332 lb (150.6 kg)   02/15/18 (!) 341 lb 6.4 oz (154.9 kg)     Physical Exam  Constitutional:       General: She is not in acute distress. Appearance: She is obese. She is not ill-appearing, toxic-appearing or diaphoretic. HENT:      Head: Normocephalic and atraumatic.       Right Ear: Tympanic membrane, ear canal and external ear normal. There Essential hypertension  CBC    Comprehensive Metabolic Panel    EKG 12 lead    EKG 12 lead   4. KRAISSA (obstructive sleep apnea)     5. LVH (left ventricular hypertrophy)  CBC    Comprehensive Metabolic Panel    Brain Monique Huffman MD, Cardiology, [place]    EKG 12 lead    EKG 12 lead   6. Diastolic dysfunction  Patrizia Fernandez MD, Cardiology, [place]   7. Tobacco abuse     8. SOB (shortness of breath) on exertion  Brain Natriuretic Peptide    EKG 12 lead    EKG 12 lead   9. Coronary artery disease involving native coronary artery of native heart, angina presence unspecified  CBC    Comprehensive Metabolic Panel    Lipid Panel    EKG 12 lead    EKG 12 lead   10. Breast cancer screening  [facility] DIGITAL SCREEN W CAD BILATERAL   11. Colon cancer screening  Cologuard (For External Results Only)   12. Candida infection  nystatin (MYCOSTATIN) 678244 UNIT/GM powder    DISCONTINUED: nystatin (MYCOSTATIN) 252798 UNIT/GM powder   13. Pruritus, unspecified   TSH with Reflex   14. Encounter for screening mammogram for breast cancer  [facility] DIGITAL SCREEN W CAD BILATERAL   15. Weight gain  TSH with Reflex    Vitamin D 25 Hydroxy    Hemoglobin A1C   16. Sequelae of hyperalimentation   Vitamin D 25 Hydroxy   17. Abnormal finding of blood chemistry, unspecified   Hemoglobin A1C   18. Lower extremity edema     19. Tobacco abuse counseling     2 week follow up with me. Orders Placed This Encounter   Procedures    Cologuard (For External Results Only)     This test is performed by an external laboratory and is used for result attachment only. It is required that this order requisition be faxed to: [facility] @ [phone]. See [email or web address] for further information.      Standing Status:   Future     Standing Expiration Date:   1/24/2021    [facility] DIGITAL SCREEN W CAD BILATERAL     Standing Status:   Future     Standing Expiration Date:   1/23/2021     Order Specific (ZITHROMAX Z-ROSALINA) 250 MG tablet LIST CLEANUP    mometasone-formoterol (DULERA) 100-5 MCG/ACT inhaler LIST CLEANUP    beclomethasone (QVAR) 80 MCG/ACT inhaler LIST CLEANUP    conjugated estrogens (PREMARIN) 0.625 MG/GM vaginal cream LIST CLEANUP    metoprolol (LOPRESSOR) 25 MG tablet LIST CLEANUP    furosemide (LASIX) 40 MG tablet LIST CLEANUP    potassium chloride SA (K-DUR;KLOR-CON M) 20 MEQ tablet LIST CLEANUP    pravastatin (PRAVACHOL) 20 MG tablet LIST CLEANUP    omeprazole (PRILOSEC) 20 MG capsule LIST CLEANUP    albuterol (PROAIR HFA) 108 (90 BASE) MCG/ACT inhaler LIST CLEANUP    nystatin (MYCOSTATIN) 543861 UNIT/GM powder REORDER     No follow-ups on file. Reviewed with the patient: current clinical status,medications, activities and diet. Side effects, adverse effects of themedication prescribed today, as well as treatment plan/ rationale and result expectations have been discussed with the patient who expresses understanding and desires to proceed. Close follow up to evaluate treatment results and for coordination of care. I have reviewed the patient's medical history in detail and updated the computerized patient record.      Zoie Ervin PA-C

## 2020-01-29 PROBLEM — I25.10 CORONARY ARTERY DISEASE INVOLVING NATIVE CORONARY ARTERY OF NATIVE HEART: Status: ACTIVE | Noted: 2020-01-29

## 2020-01-29 PROBLEM — R63.5 WEIGHT GAIN: Status: ACTIVE | Noted: 2020-01-29

## 2020-01-29 PROBLEM — R10.2 PELVIC PAIN IN FEMALE: Chronic | Status: RESOLVED | Noted: 2018-02-15 | Resolved: 2020-01-29

## 2020-01-29 PROBLEM — B37.9 CANDIDA INFECTION: Status: ACTIVE | Noted: 2020-01-29

## 2020-01-29 PROBLEM — L29.9 PRURITUS, UNSPECIFIED: Status: ACTIVE | Noted: 2020-01-29

## 2020-01-29 ASSESSMENT — ENCOUNTER SYMPTOMS
BLOOD IN STOOL: 0
NAUSEA: 0
TROUBLE SWALLOWING: 0
SHORTNESS OF BREATH: 1
CHEST TIGHTNESS: 1
BACK PAIN: 1
WHEEZING: 0
CONSTIPATION: 1
APNEA: 0
COUGH: 1
CHOKING: 0

## 2020-01-31 ENCOUNTER — OFFICE VISIT (OUTPATIENT)
Dept: FAMILY MEDICINE CLINIC | Age: 55
End: 2020-01-31
Payer: MEDICARE

## 2020-01-31 VITALS
BODY MASS INDEX: 48.82 KG/M2 | HEIGHT: 65 IN | HEART RATE: 85 BPM | WEIGHT: 293 LBS | TEMPERATURE: 97.9 F | OXYGEN SATURATION: 90 % | RESPIRATION RATE: 16 BRPM

## 2020-01-31 VITALS
HEIGHT: 65 IN | WEIGHT: 293 LBS | HEART RATE: 85 BPM | BODY MASS INDEX: 48.82 KG/M2 | DIASTOLIC BLOOD PRESSURE: 68 MMHG | RESPIRATION RATE: 16 BRPM | SYSTOLIC BLOOD PRESSURE: 110 MMHG | OXYGEN SATURATION: 90 % | TEMPERATURE: 97.9 F

## 2020-01-31 PROBLEM — R45.89 DEPRESSED MOOD: Status: ACTIVE | Noted: 2020-01-31

## 2020-01-31 PROBLEM — M79.672 BILATERAL LEG AND FOOT PAIN: Status: ACTIVE | Noted: 2020-01-31

## 2020-01-31 PROBLEM — M79.604 BILATERAL LEG AND FOOT PAIN: Status: ACTIVE | Noted: 2020-01-31

## 2020-01-31 PROBLEM — E55.9 VITAMIN D DEFICIENCY: Status: ACTIVE | Noted: 2020-01-31

## 2020-01-31 PROBLEM — J42 CHRONIC BRONCHITIS (HCC): Status: ACTIVE | Noted: 2020-01-31

## 2020-01-31 PROBLEM — M79.605 BILATERAL LEG AND FOOT PAIN: Status: ACTIVE | Noted: 2020-01-31

## 2020-01-31 PROBLEM — M79.671 BILATERAL LEG AND FOOT PAIN: Status: ACTIVE | Noted: 2020-01-31

## 2020-01-31 PROCEDURE — G8431 POS CLIN DEPRES SCRN F/U DOC: HCPCS | Performed by: PHYSICIAN ASSISTANT

## 2020-01-31 PROCEDURE — 99214 OFFICE O/P EST MOD 30 MIN: CPT | Performed by: PHYSICIAN ASSISTANT

## 2020-01-31 PROCEDURE — G0438 PPPS, INITIAL VISIT: HCPCS | Performed by: PHYSICIAN ASSISTANT

## 2020-01-31 RX ORDER — ALBUTEROL SULFATE 90 UG/1
2 AEROSOL, METERED RESPIRATORY (INHALATION) 2 TIMES DAILY
Qty: 2 INHALER | Refills: 3 | Status: SHIPPED | OUTPATIENT
Start: 2020-01-31 | End: 2020-01-31 | Stop reason: SDUPTHER

## 2020-01-31 RX ORDER — ALBUTEROL SULFATE 90 UG/1
2 AEROSOL, METERED RESPIRATORY (INHALATION) 2 TIMES DAILY
Qty: 2 INHALER | Refills: 3 | Status: SHIPPED | OUTPATIENT
Start: 2020-01-31 | End: 2022-08-29 | Stop reason: SDUPTHER

## 2020-01-31 RX ORDER — ERGOCALCIFEROL 1.25 MG/1
50000 CAPSULE ORAL WEEKLY
Qty: 12 CAPSULE | Refills: 1 | Status: SHIPPED | OUTPATIENT
Start: 2020-01-31 | End: 2020-01-31 | Stop reason: SDUPTHER

## 2020-01-31 RX ORDER — NYSTATIN 100000 U/G
CREAM TOPICAL
Qty: 30 G | Refills: 2 | Status: SHIPPED | OUTPATIENT
Start: 2020-01-31 | End: 2020-01-31 | Stop reason: SDUPTHER

## 2020-01-31 RX ORDER — FUROSEMIDE 20 MG/1
20 TABLET ORAL DAILY
Qty: 90 TABLET | Refills: 1 | Status: SHIPPED | OUTPATIENT
Start: 2020-01-31 | End: 2020-05-28 | Stop reason: SDUPTHER

## 2020-01-31 RX ORDER — BUPROPION HYDROCHLORIDE 150 MG/1
150 TABLET ORAL EVERY MORNING
Qty: 30 TABLET | Refills: 3 | Status: SHIPPED | OUTPATIENT
Start: 2020-01-31 | End: 2020-05-22 | Stop reason: SDUPTHER

## 2020-01-31 RX ORDER — NYSTATIN 100000 U/G
CREAM TOPICAL
Qty: 30 G | Refills: 2 | Status: SHIPPED | OUTPATIENT
Start: 2020-01-31 | End: 2020-06-19 | Stop reason: SDUPTHER

## 2020-01-31 RX ORDER — ERGOCALCIFEROL 1.25 MG/1
50000 CAPSULE ORAL WEEKLY
Qty: 12 CAPSULE | Refills: 1 | Status: SHIPPED | OUTPATIENT
Start: 2020-01-31 | End: 2020-05-21

## 2020-01-31 RX ORDER — FUROSEMIDE 20 MG/1
20 TABLET ORAL DAILY
Qty: 90 TABLET | Refills: 1 | Status: SHIPPED | OUTPATIENT
Start: 2020-01-31 | End: 2020-01-31 | Stop reason: SDUPTHER

## 2020-01-31 RX ORDER — BUPROPION HYDROCHLORIDE 150 MG/1
150 TABLET ORAL EVERY MORNING
Qty: 30 TABLET | Refills: 3 | Status: SHIPPED | OUTPATIENT
Start: 2020-01-31 | End: 2020-01-31 | Stop reason: SDUPTHER

## 2020-01-31 ASSESSMENT — PATIENT HEALTH QUESTIONNAIRE - PHQ9: SUM OF ALL RESPONSES TO PHQ QUESTIONS 1-9: 14

## 2020-01-31 ASSESSMENT — ENCOUNTER SYMPTOMS
NAUSEA: 0
COUGH: 1
BLOOD IN STOOL: 0
TROUBLE SWALLOWING: 0
APNEA: 0
BACK PAIN: 1
WHEEZING: 0
CHEST TIGHTNESS: 1
CONSTIPATION: 1
CHOKING: 0
SHORTNESS OF BREATH: 0

## 2020-01-31 ASSESSMENT — LIFESTYLE VARIABLES: HOW OFTEN DO YOU HAVE A DRINK CONTAINING ALCOHOL: 0

## 2020-01-31 NOTE — PROGRESS NOTES
Medicare Annual Wellness Visit  Name: Stephanie Cedeño Date: 2020   MRN: [id number] Sex: Female   Age: 49 y.o. Ethnicity: Non-/Non    : 1965 Race: Emily Menard is here for Medicare AWV (patient is here today for her medicare annual wellness visit )    Screenings for behavioral, psychosocial and functional/safety risks, and cognitive dysfunction are all negative except as indicated below. These results, as well as other patient data from the [address] form, are documented in Flowsheets linked to this Encounter. Allergies   Allergen Reactions    Food Hives     Patient still eats strawberries    Other Rash     grass         Prior to Visit Medications    Medication Sig Taking? Authorizing Provider   nystatin (MYCOSTATIN) 199259 UNIT/GM powder Apply 3 times daily. Yes Zoie Ervin PA-C   nystatin (MYCOSTATIN) 170921 UNIT/GM cream Apply topically 2 times daily.   Zoie Ervin PA-C   buPROPion (WELLBUTRIN XL) 150 MG extended release tablet Take 1 tablet by mouth every morning  Zoie Ervin PA-C   furosemide (LASIX) 20 MG tablet Take 1 tablet by mouth daily  Zoie Ervin PA-C   vitamin D (ERGOCALCIFEROL) 1.25 MG (70327 UT) CAPS capsule Take 1 capsule by mouth once a week  Zoie Ervin PA-C   albuterol sulfate  (90 Base) MCG/ACT inhaler Inhale 2 puffs into the lungs 2 times daily  Luis Hyde PA-C         Past Medical History:   Diagnosis Date    Anxiety     Asthma     [facility]     Chronic back pain     COPD ([facility] obstructive pulmonary disease) ([facility] [address]     Depression     Hypertension     Obesity     Pelvic pain in female 2/15/2018       Past Surgical History:   Procedure Laterality Date    BLADDER SUSPENSION  2014    CARPAL TUNNEL RELEASE  89 or 90    right hand     HYSTERECTOMY      full    MS CMBND ANTERPOST COLPORRAPHY W/CYSTO W/NTRCL RPR N/A 2018    EXCISION OF VAGINAL MESH, CYSTOSCOPY performed by Monty Cummins DO at [facility]      27 yrs ago         Family History   Problem Relation Age of Onset    Alzheimer's Disease Father     High Blood Pressure Father     Stroke Father         11 strokes in one year    Asthma Sister     Asthma Brother     No Known Problems Daughter     No Known Problems Daughter        CareTeam (Including outside providers/suppliers regularly involved in providing care):   Patient Care Team:  Kortney Chatterjee PA-C as PCP - General (Family Medicine)  Kortney Chatterjee PA-C as PCP - [facility] Empaneled Provider    Wt Readings from Last 3 Encounters:   01/31/20 (!) 346 lb 9.6 oz (157.2 kg)   01/31/20 (!) 346 lb 14.4 oz (157.4 kg)   01/24/20 (!) 341 lb 6.4 oz (154.9 kg)     Vitals:    01/31/20 1130   BP: 110/68   Site: Left Upper Arm   Position: Sitting   Cuff Size: Large Adult   Pulse: 85   Resp: 16   Temp: 97.9 °F (36.6 °C)   TempSrc: Tympanic   SpO2: 90%   Weight: (!) 346 lb 9.6 oz (157.2 kg)   Height: 5' 5\" (1.651 m)     Body mass index is 57.68 kg/m². Based upon direct observation of the patient, evaluation of cognition reveals recent and remote memory intact. Patient's complete Health Risk Assessment and screening values have been reviewed and are found in Flowsheets. The following problems were reviewed today and where indicated follow up appointments were made and/or referrals ordered.     Positive Risk Factor Screenings with Interventions:     Substance Abuse:  Social History     Tobacco History     Smoking Status  Current Every Day Smoker Smoking Start Date  5/6/1979 Smoking Frequency  1 pack/day Smoking Tobacco Type  Cigarettes    Smokeless Tobacco Use  Never Used          Alcohol History     Alcohol Use Status  No          Drug Use     Drug Use Status  No          Sexual Activity     Sexually Active  Yes Partners  Male               Audit Questionnaire: Screen for Alcohol Misuse  How often do you have a drink containing alcohol?: Never  Substance Abuse

## 2020-01-31 NOTE — PROGRESS NOTES
CYSTOSCOPY performed by Dasha Almanza DO at [facility]      27 yrs ago     Social History     Socioeconomic History    Marital status: Legally      Spouse name: Not on file    Number of children: Not on file    Years of education: Not on file    Highest education level: Not on file   Occupational History    Not on file   Social Needs    Financial resource strain: Not on file    Food insecurity:     Worry: Not on file     Inability: Not on file    Transportation needs:     Medical: Not on file     Non-medical: Not on file   Tobacco Use    Smoking status: Current Every Day Smoker     Packs/day: 1.00     Types: Cigarettes     Start date: 5/6/1979    Smokeless tobacco: Never Used   Substance and Sexual Activity    Alcohol use: No    Drug use: No    Sexual activity: Yes     Partners: Male   Lifestyle    Physical activity:     Days per week: Not on file     Minutes per session: Not on file    Stress: Not on file   Relationships    Social connections:     Talks on phone: Not on file     Gets together: Not on file     Attends Christian service: Not on file     Active member of club or organization: Not on file     Attends meetings of clubs or organizations: Not on file     Relationship status: Not on file    Intimate partner violence:     Fear of current or ex partner: Not on file     Emotionally abused: Not on file     Physically abused: Not on file     Forced sexual activity: Not on file   Other Topics Concern    Not on file   Social History Narrative    Not on file     Family History   Problem Relation Age of Onset    Alzheimer's Disease Father     High Blood Pressure Father     Stroke Father         11 strokes in one year    Asthma Sister     Asthma Brother     No Known Problems Daughter     No Known Problems Daughter      Allergies   Allergen Reactions   [address]     Patient still eats strawberries    Other Rash     grass     Current Outpatient Medications Effort: Pulmonary effort is normal. No respiratory distress. Breath sounds: Normal breath sounds. No stridor. No wheezing or rhonchi. Abdominal:      Comments: Obese abdomen   Musculoskeletal:         General: Tenderness present. Right lower leg: Edema present. Left lower leg: Edema present. Skin:     General: Skin is warm and dry. Coloration: Skin is not jaundiced or pale. Findings: Rash present. No bruising or lesion. Neurological:      General: No focal deficit present. Mental Status: She is alert and oriented to person, place, and time. Psychiatric:         Attention and Perception: Attention normal.         Speech: Speech normal.         Behavior: Behavior normal.         Cognition and Memory: Cognition normal.         Judgment: Judgment normal.      Comments: No change from OV a week ago; open to starting antidepressant. Denies SI/HI. Assessment & Plan   Kaye Ames was seen today for results. Diagnoses and all orders for this visit:    BMI 50.0-59.9, adult ([facility] [address]  -     Insulin Free & Total; Future    Lower extremity edema  -     Jerry Salgado, PAULA, Interventional Radiology, [place]  -     Discontinue: furosemide (LASIX) 20 MG tablet; Take 1 tablet by mouth daily  -     furosemide (LASIX) 20 MG tablet; Take 1 tablet by mouth daily    Bilateral leg and foot pain  -     Evelia Salgado, NP, Interventional Radiology, [place]    Viridiana infection  -     Discontinue: nystatin (MYCOSTATIN) 068741 UNIT/GM cream; Apply topically 2 times daily. -     nystatin (MYCOSTATIN) 443717 UNIT/GM cream; Apply topically 2 times daily. Depressed mood  -     Discontinue: buPROPion (WELLBUTRIN XL) 150 MG extended release tablet; Take 1 tablet by mouth every morning  -     buPROPion (WELLBUTRIN XL) 150 MG extended release tablet;  Take 1 tablet by mouth every morning    Vitamin D deficiency  -     Discontinue: vitamin D (ERGOCALCIFEROL) 1.25 MG (05061 UT) CAPS capsule;

## 2020-01-31 NOTE — PATIENT INSTRUCTIONS
Personalized Preventive Plan for Maira Perdomo - 1/31/2020  Medicare offers a range of preventive health benefits. Some of the tests and screenings are paid in full while other may be subject to a deductible, co-insurance, and/or copay. Some of these benefits include a comprehensive review of your medical history including lifestyle, illnesses that may run in your family, and various assessments and screenings as appropriate. After reviewing your medical record and screening and assessments performed today your provider may have ordered immunizations, labs, imaging, and/or referrals for you. A list of these orders (if applicable) as well as your Preventive Care list are included within your After Visit Summary for your review. Other Preventive Recommendations:    · A preventive eye exam performed by an eye specialist is recommended every 1-2 years to screen for glaucoma; cataracts, macular degeneration, and other eye disorders. · A preventive dental visit is recommended every 6 months. · Try to get at least 150 minutes of exercise per week or 10,000 steps per day on a pedometer . · Order or download the FREE \"Exercise & Physical Activity: Your Everyday Guide\" from [employer]. Call 9-568.168.1508 or search [employer] online. · You need 5634-7202 mg of calcium and 3244-4283 IU of vitamin D per day. It is possible to meet your calcium requirement with diet alone, but a vitamin D supplement is usually necessary to meet this goal.  · When exposed to the sun, use a sunscreen that protects against both UVA and UVB radiation with an SPF of 30 or greater. Reapply every 2 to 3 hours or after sweating, drying off with a towel, or swimming. · Always wear a seat belt when traveling in a car. Always wear a helmet when riding a bicycle or motorcycle.

## 2020-02-02 LAB
INSULIN FREE: 17 UIU/ML (ref 3–19)
INSULIN: 22 UIU/ML (ref 3–19)

## 2020-02-05 ENCOUNTER — OFFICE VISIT (OUTPATIENT)
Dept: CARDIOLOGY CLINIC | Age: 55
End: 2020-02-05
Payer: MEDICARE

## 2020-02-05 VITALS
DIASTOLIC BLOOD PRESSURE: 86 MMHG | WEIGHT: 293 LBS | OXYGEN SATURATION: 88 % | RESPIRATION RATE: 20 BRPM | HEART RATE: 88 BPM | HEIGHT: 65 IN | BODY MASS INDEX: 48.82 KG/M2 | SYSTOLIC BLOOD PRESSURE: 124 MMHG

## 2020-02-05 PROBLEM — I10 ESSENTIAL HYPERTENSION: Status: ACTIVE | Noted: 2018-02-15

## 2020-02-05 PROBLEM — J44.9 CHRONIC OBSTRUCTIVE PULMONARY DISEASE (HCC): Status: ACTIVE | Noted: 2020-02-05

## 2020-02-05 PROBLEM — R53.81 MALAISE AND FATIGUE: Status: ACTIVE | Noted: 2020-02-05

## 2020-02-05 PROBLEM — R53.83 MALAISE AND FATIGUE: Status: ACTIVE | Noted: 2020-02-05

## 2020-02-05 PROBLEM — E78.2 MIXED HYPERLIPIDEMIA: Status: ACTIVE | Noted: 2020-02-05

## 2020-02-05 PROBLEM — R42 DIZZINESS AND GIDDINESS: Status: ACTIVE | Noted: 2020-02-05

## 2020-02-05 PROBLEM — M17.9 OSTEOARTHRITIS OF KNEE: Status: ACTIVE | Noted: 2020-02-05

## 2020-02-05 PROBLEM — I50.9 HEART FAILURE (HCC): Status: ACTIVE | Noted: 2020-02-05

## 2020-02-05 PROBLEM — N39.3 STRESS INCONTINENCE OF URINE: Status: ACTIVE | Noted: 2020-02-05

## 2020-02-05 PROBLEM — K21.9 GASTRO-ESOPHAGEAL REFLUX DISEASE WITHOUT ESOPHAGITIS: Status: ACTIVE | Noted: 2020-02-05

## 2020-02-05 PROBLEM — K29.00 ACUTE GASTRITIS: Status: ACTIVE | Noted: 2020-02-05

## 2020-02-05 PROBLEM — M54.50 LOW BACK PAIN: Status: ACTIVE | Noted: 2020-02-05

## 2020-02-05 PROBLEM — Z78.0 MENOPAUSE PRESENT: Status: ACTIVE | Noted: 2020-02-05

## 2020-02-05 PROBLEM — E11.9 TYPE 2 DIABETES MELLITUS WITHOUT COMPLICATION (HCC): Status: ACTIVE | Noted: 2020-02-05

## 2020-02-05 PROBLEM — F41.1 GENERALIZED ANXIETY DISORDER: Status: ACTIVE | Noted: 2020-02-05

## 2020-02-05 PROBLEM — D50.0 IRON DEFICIENCY ANEMIA DUE TO CHRONIC BLOOD LOSS: Status: ACTIVE | Noted: 2020-02-05

## 2020-02-05 PROBLEM — M50.90 CERVICAL DISC DISORDER: Status: ACTIVE | Noted: 2020-02-05

## 2020-02-05 PROCEDURE — 99204 OFFICE O/P NEW MOD 45 MIN: CPT | Performed by: INTERNAL MEDICINE

## 2020-02-05 RX ORDER — SPIRONOLACTONE 25 MG/1
25 TABLET ORAL DAILY
Qty: 90 TABLET | Refills: 3 | Status: SHIPPED | OUTPATIENT
Start: 2020-02-05 | End: 2020-05-28

## 2020-02-05 ASSESSMENT — ENCOUNTER SYMPTOMS
COUGH: 0
FACIAL SWELLING: 0
SHORTNESS OF BREATH: 1
APNEA: 0
ABDOMINAL PAIN: 0
DIARRHEA: 0
TROUBLE SWALLOWING: 0
ANAL BLEEDING: 0
WHEEZING: 0
CHEST TIGHTNESS: 0
BLOOD IN STOOL: 0
VOMITING: 0
VOICE CHANGE: 0
ABDOMINAL DISTENTION: 0
NAUSEA: 0
COLOR CHANGE: 0

## 2020-02-24 NOTE — H&P (VIEW-ONLY)
Genitourinary: Positive for frequency and urgency. Negative for dysuria. Pelvic pain, stabbing pain;    Musculoskeletal: Positive for joint pain (arthritis). Negative for back pain, myalgias and neck pain. Skin: Negative. Negative for itching and rash. Neurological: Positive for headaches (for past 3 days). Negative for dizziness, tingling, tremors, seizures and loss of consciousness. Endo/Heme/Allergies: Does not bruise/bleed easily. Psychiatric/Behavioral: Negative for depression. The patient has insomnia. The patient is not nervous/anxious. Physical Exam   Constitutional: She is oriented to person, place, and time. She appears well-developed and well-nourished. HENT:   Head: Normocephalic and atraumatic. Right Ear: External ear normal.   Left Ear: External ear normal.   Nose: Nose normal.   Mouth/Throat: Oropharynx is clear and moist.   Eyes: Conjunctivae and EOM are normal. Pupils are equal, round, and reactive to light. Neck: Normal range of motion. Neck supple. No tracheal deviation present. No thyromegaly present. Cardiovascular: Normal rate, regular rhythm and normal heart sounds. Exam reveals no gallop. No murmur heard. Edema of legs makes it difficult to palpate posterior tibial pulses. Dorsalis pedis and radial pulses 2+. Pulmonary/Chest: Effort normal and breath sounds normal. No respiratory distress. Abdominal: Soft. Bowel sounds are normal. There is tenderness (lower left and upper right quadrants). Musculoskeletal: Normal range of motion. She exhibits no edema. Lymphadenopathy:     She has no cervical adenopathy. Neurological: She is alert and oriented to person, place, and time. Skin: Skin is warm and dry. No erythema. Psychiatric: She has a normal mood and affect. Her behavior is normal.   Vitals reviewed.       Assessment:  Pelvic pain    Plan:  Excision of vaginal mesh, cystoscopy    Ashley Valle NP  2/15/2018 ambulate

## 2020-03-06 ENCOUNTER — OFFICE VISIT (OUTPATIENT)
Dept: FAMILY MEDICINE CLINIC | Age: 55
End: 2020-03-06
Payer: MEDICARE

## 2020-03-06 VITALS
DIASTOLIC BLOOD PRESSURE: 84 MMHG | BODY MASS INDEX: 48.82 KG/M2 | SYSTOLIC BLOOD PRESSURE: 128 MMHG | WEIGHT: 293 LBS | OXYGEN SATURATION: 97 % | HEART RATE: 88 BPM | RESPIRATION RATE: 16 BRPM | TEMPERATURE: 97 F | HEIGHT: 65 IN

## 2020-03-06 PROBLEM — M79.671 BILATERAL LEG AND FOOT PAIN: Status: RESOLVED | Noted: 2020-01-31 | Resolved: 2020-03-06

## 2020-03-06 PROBLEM — R42 DIZZINESS AND GIDDINESS: Status: RESOLVED | Noted: 2020-02-05 | Resolved: 2020-03-06

## 2020-03-06 PROBLEM — M54.50 LOW BACK PAIN: Status: RESOLVED | Noted: 2020-02-05 | Resolved: 2020-03-06

## 2020-03-06 PROBLEM — E11.9 TYPE 2 DIABETES MELLITUS WITHOUT COMPLICATION (HCC): Status: RESOLVED | Noted: 2020-02-05 | Resolved: 2020-03-06

## 2020-03-06 PROBLEM — Z78.0 MENOPAUSE PRESENT: Status: RESOLVED | Noted: 2020-02-05 | Resolved: 2020-03-06

## 2020-03-06 PROBLEM — J42 CHRONIC BRONCHITIS (HCC): Status: RESOLVED | Noted: 2020-01-31 | Resolved: 2020-03-06

## 2020-03-06 PROBLEM — M79.672 BILATERAL LEG AND FOOT PAIN: Status: RESOLVED | Noted: 2020-01-31 | Resolved: 2020-03-06

## 2020-03-06 PROBLEM — D50.0 IRON DEFICIENCY ANEMIA DUE TO CHRONIC BLOOD LOSS: Status: RESOLVED | Noted: 2020-02-05 | Resolved: 2020-03-06

## 2020-03-06 PROBLEM — K29.00 ACUTE GASTRITIS: Status: RESOLVED | Noted: 2020-02-05 | Resolved: 2020-03-06

## 2020-03-06 PROBLEM — N39.3 STRESS INCONTINENCE OF URINE: Status: RESOLVED | Noted: 2020-02-05 | Resolved: 2020-03-06

## 2020-03-06 PROBLEM — R63.5 WEIGHT GAIN: Status: RESOLVED | Noted: 2020-01-29 | Resolved: 2020-03-06

## 2020-03-06 PROBLEM — L29.9 PRURITUS, UNSPECIFIED: Status: RESOLVED | Noted: 2020-01-29 | Resolved: 2020-03-06

## 2020-03-06 PROBLEM — E78.2 MIXED HYPERLIPIDEMIA: Status: RESOLVED | Noted: 2020-02-05 | Resolved: 2020-03-06

## 2020-03-06 PROBLEM — M79.605 BILATERAL LEG AND FOOT PAIN: Status: RESOLVED | Noted: 2020-01-31 | Resolved: 2020-03-06

## 2020-03-06 PROBLEM — M79.604 BILATERAL LEG AND FOOT PAIN: Status: RESOLVED | Noted: 2020-01-31 | Resolved: 2020-03-06

## 2020-03-06 PROBLEM — B37.9 CANDIDA INFECTION: Status: RESOLVED | Noted: 2020-01-29 | Resolved: 2020-03-06

## 2020-03-06 PROCEDURE — 99214 OFFICE O/P EST MOD 30 MIN: CPT | Performed by: PHYSICIAN ASSISTANT

## 2020-03-06 ASSESSMENT — ENCOUNTER SYMPTOMS
COUGH: 0
SHORTNESS OF BREATH: 0
CHEST TIGHTNESS: 0

## 2020-03-06 NOTE — PROGRESS NOTES
Neurological: Negative for dizziness, weakness, light-headedness and headaches. Psychiatric/Behavioral: Positive for sleep disturbance.      Past Medical History:   Diagnosis Date    Anxiety     Asthma     CH[facility]congestive [facility] failure) [facility]     Chronic back pain     COPD (chronic obstructive pulmonary disease) ([facility]     Depression     Hypertension     Obesity     Pelvic pain in female 2/15/2018    Type 2 diabetes mellitus without complication ([facility] [address] 0/0/3509     Past Surgical History:   Procedure Laterality Date    BLADDER SUSPENSION  02/2014    CARPAL TUNNEL RELEASE  89 or 90    right hand     HYSTERECTOMY  2009    full    [place] CMBND ANTERPOST COLPORRAPHY W/CYSTO W/NTRCL RPR N/A 2/19/2018    EXCISION OF VAGINAL MESH, CYSTOSCOPY performed by Artem Anand DO at [facility]      27 yrs ago     Social History     Socioeconomic History    Marital status: Legally      Spouse name: Not on file    Number of children: Not on file    Years of education: Not on file    Highest education level: Not on file   Occupational History    Not on file   Social Needs    Financial resource strain: Not on file    Food insecurity:     Worry: Not on file     Inability: Not on file    Transportation needs:     Medical: Not on file     Non-medical: Not on file   Tobacco Use    Smoking status: Current Every Day Smoker     Packs/day: 1.00     Types: Cigarettes     Start date: 5/6/1979    Smokeless tobacco: Never Used   Substance and Sexual Activity    Alcohol use: No    Drug use: No    Sexual activity: Yes     Partners: Male   Lifestyle    Physical activity:     Days per week: Not on file     Minutes per session: Not on file    Stress: Not on file   Relationships    Social connections:     Talks on phone: Not on file     Gets together: Not on file     Attends Caodaism service: Not on file     Active member of club or organization: Not on file     Attends meetings of clubs or organizations: Not on file     Relationship status: Not on file    Intimate partner violence:     Fear of current or ex partner: Not on file     Emotionally abused: Not on file     Physically abused: Not on file     Forced sexual activity: Not on file   Other Topics Concern    Not on file   Social History Narrative    Not on file     Family History   Problem Relation Age of Onset    Alzheimer's Disease Father     High Blood Pressure Father     Stroke Father         11 strokes in one year    Asthma Sister     Asthma Brother     No Known Problems Daughter     No Known Problems Daughter      Allergies   Allergen Reactions    Food Hives     Patient still eats strawberries    Other Rash     grass     Current Outpatient Medications   Medication Sig Dispense Refill    spironolactone (ALDACTONE) 25 MG tablet Take 1 tablet by mouth daily 90 tablet 3    nystatin (MYCOSTATIN) 014116 UNIT/GM cream Apply topically 2 times daily. 30 g 2    buPROPion (WELLBUTRIN XL) 150 MG extended release tablet Take 1 tablet by mouth every morning 30 tablet 3    furosemide (LASIX) 20 MG tablet Take 1 tablet by mouth daily 90 tablet 1    vitamin D (ERGOCALCIFEROL) 1.25 MG (48666 UT) CAPS capsule Take 1 capsule by mouth once a week 12 capsule 1    albuterol sulfate  (90 Base) MCG/ACT inhaler Inhale 2 puffs into the lungs 2 times daily 2 Inhaler 3    nystatin (MYCOSTATIN) 040830 UNIT/GM powder Apply 3 times daily. 60 g 2     No current facility-administered medications for this visit. PMH, Surgical Hx, Family Hx, and Social Hx reviewed and updated. Health Maintenance reviewed.     Objective  Vitals:    03/06/20 0824   BP: 128/84   Site: Left Upper Arm   Position: Sitting   Cuff Size: Large Adult   Pulse: 88   Resp: 16   Temp: 97 °F (36.1 °C)   TempSrc: Temporal   SpO2: 97%   Weight: (!) 343 lb 6.4 oz (155.8 kg)   Height: 5' 5\" (1.651 m)     BP Readings from Last 3 Encounters:   03/06/20 128/84   02/05/20 124/86 01/31/20 110/68     Wt Readings from Last 3 Encounters:   03/06/20 (!) 343 lb 6.4 oz (155.8 kg)   02/05/20 (!) 346 lb (156.9 kg)   01/31/20 (!) 346 lb 9.6 oz (157.2 kg)     Physical Exam  Vitals signs reviewed. Constitutional:       General: She is not in acute distress. Appearance: She is obese. She is not ill-appearing, toxic-appearing or diaphoretic. HENT:      Head: Normocephalic and atraumatic. Right Ear: Tympanic membrane, ear canal and external ear normal. There is no impacted cerumen. Left Ear: Tympanic membrane, ear canal and external ear normal. There is no impacted cerumen. Mouth/Throat:      Mouth: Mucous membranes are moist.      Pharynx: Oropharynx is clear. Eyes:      Conjunctiva/sclera: Conjunctivae normal.   Cardiovascular:      Rate and Rhythm: Normal rate and regular rhythm. Heart sounds: Normal heart sounds. No murmur. Pulmonary:      Effort: Pulmonary effort is normal. No respiratory distress. Breath sounds: Normal breath sounds. No stridor. No wheezing or rhonchi. Abdominal:      Comments: Obese abdomen   Musculoskeletal:      Right lower leg: No edema. Left lower leg: No edema. Skin:     General: Skin is warm and dry. Coloration: Skin is not jaundiced or pale. Findings: No bruising, lesion or rash. Neurological:      General: No focal deficit present. Mental Status: She is alert and oriented to person, place, and time. Psychiatric:         Attention and Perception: Attention normal.         Mood and Affect: Mood and affect normal.         Speech: Speech normal.         Behavior: Behavior normal.         Cognition and Memory: Cognition normal.         Judgment: Judgment normal.      Comments: Improved mood. Feeling more calm/less irritable. Assessment & Plan   Gabriela Delaney was seen today for results. Diagnoses and all orders for this visit:    KARISSA (obstructive sleep apnea)  Comments:  repeat home sleep study.   pulmnology referral.   Orders:  -     Home Sleep Study; Future  -     Ambulatory referral to Pulmonology    BMI 50.0-59.9, adult [facility])  Comments:  encouraged weight loss. Exercise encouraged. Orders:  -     Home Sleep Study; Future  -     Ambulatory referral to Pulmonology    Essential hypertension  Comments:  controlled today. Lower extremity edema  Comments:  aldactone per cardiology. prn lasix. Depressed mood  Comments:  continue wellbutrin. Malaise and fatigue  Comments:  not using cpap.  sleep study, pulmonology referral.     Generalized anxiety disorder  Comments:  controlled. 3 month follow up. Call with any questions or concerns. Orders Placed This Encounter   Procedures    Ambulatory referral to Pulmonology     Referral Priority:   Routine     Referral Type:   Eval and Treat     Referral Reason:   Specialty Services Required     Referred to Provider:   Sigifredo Hernandez MD     Requested Specialty:   Pulmonology     Number of Visits Requested:   1    Home Sleep Study     Patients with abnormal results will be assessed and referred to the sleep  as needed. Standing Status:   Future     Standing Expiration Date:   3/6/2021     Scheduling Instructions:      Scheduling and Pre-Certification: [phone]      The following must be completed and FAXED to [phone]      1) Sleep Evaluation Orders Form      2) Office note justifying study      3) Demographic info / insurance card     Order Specific Question:   Location For Sleep Study     Answer:   [place]     Order Specific Question:   Select Sleep Lab Location     Answer:   [facility]     Comments:   [place]     No orders of the defined types were placed in this encounter. There are no discontinued medications. No follow-ups on file. Reviewed with the patient: current clinical status, medications, activities and diet.      Side effects, adverse effects of the medication prescribed today, as well as treatment plan/ rationale and result expectations have been discussed with the patient who expresses understanding and desires to proceed. Close follow up to evaluate treatment results and for coordination of care. I have reviewed the patient's medical history in detail and updated the computerized patient record.     Zoie Ervin PA-C

## 2020-05-18 ENCOUNTER — TELEPHONE (OUTPATIENT)
Dept: FAMILY MEDICINE CLINIC | Age: 55
End: 2020-05-18

## 2020-05-21 ENCOUNTER — TELEPHONE (OUTPATIENT)
Dept: FAMILY MEDICINE CLINIC | Age: 55
End: 2020-05-21

## 2020-05-21 NOTE — TELEPHONE ENCOUNTER
Patient called in said she's been in [facility] since Monday. They are releasing her today with oxygen. She would like you to place an order for her to have a hosplital bed in her home. All she has to sleep in is a recliner. She also wants a bed side commode.  Requested large bed and commode Please Advise

## 2020-05-26 LAB
ANION GAP SERPL CALCULATED.3IONS-SCNC: 16 MMOL/L (ref 10–20)
BICARBONATE: 38 MMOL/L (ref 21–32)
CALCIUM SERPL-MCNC: 9.1 MG/DL (ref 8.6–10.3)
CHLORIDE BLD-SCNC: 84 MMOL/L (ref 98–107)
CREAT SERPL-MCNC: 1.12 MG/DL (ref 0.5–1)
GFR AFRICAN AMERICAN: 62 ML/MIN/1.73M2
GFR NON-AFRICAN AMERICAN: 51 ML/MIN/1.73M2
GLUCOSE: 57 MG/DL (ref 74–99)
POTASSIUM SERPL-SCNC: 3.4 MMOL/L (ref 3.5–5.3)
SODIUM BLD-SCNC: 135 MMOL/L (ref 136–145)
UREA NITROGEN: 21 MG/DL (ref 6–23)

## 2020-05-28 ENCOUNTER — VIRTUAL VISIT (OUTPATIENT)
Dept: FAMILY MEDICINE CLINIC | Age: 55
End: 2020-05-28
Payer: MEDICARE

## 2020-05-28 PROCEDURE — 99215 OFFICE O/P EST HI 40 MIN: CPT | Performed by: PHYSICIAN ASSISTANT

## 2020-05-28 PROCEDURE — 1111F DSCHRG MED/CURRENT MED MERGE: CPT | Performed by: PHYSICIAN ASSISTANT

## 2020-05-28 RX ORDER — CARVEDILOL 3.12 MG/1
3.12 TABLET ORAL 2 TIMES DAILY
Qty: 60 TABLET | Refills: 3
Start: 2020-05-28 | End: 2020-06-19 | Stop reason: SDUPTHER

## 2020-05-28 RX ORDER — LOSARTAN POTASSIUM 50 MG/1
50 TABLET ORAL DAILY
Qty: 30 TABLET | Refills: 5 | Status: SHIPPED | OUTPATIENT
Start: 2020-05-28 | End: 2020-06-19 | Stop reason: SDUPTHER

## 2020-05-28 RX ORDER — FUROSEMIDE 40 MG/1
40 TABLET ORAL DAILY
Qty: 30 TABLET | Refills: 5
Start: 2020-05-28 | End: 2020-06-16 | Stop reason: SDUPTHER

## 2020-05-28 NOTE — PROGRESS NOTES
2020    TELEHEALTH EVALUATION -- Audio/Visual (During OMGPT-91 public health emergency)    Due to COVID 19 outbreak, patient's office visit was converted to a virtual visit. Patient was contacted and agreed to proceed with a virtual visit via Telephone Visit--length of visit encounter 35 minutes. The risks and benefits of converting to a virtual visit were discussed in light of the current infectious disease epidemic. Patient also understood that insurance coverage and co-pays are up to their individual insurance plans. HPI:    Alejandro Winters (:  1965) has requested an audio/video evaluation for the following concern(s):    Hospital follow up. Admitted to [address] on 2020 for increased weight gain, LE edema, SOB. Per patient, her legs were so swollen she was \"leaking fluid\". While admitted, cardiology was consulted and patient was seen by Dr. Tyrese Galo. Per patient, she was diuresed in the hospital, stabilized and discharged home. Since discharge home, patient is unsure as to what medications she should be taking. Would like to go over medications today. She has been taking spironolactone, lasix and zaroxolyn since hospital discharge. Denies CP, dizziness, SOB, edema of LEs at this time. Edema. Would like to know what she should do for the chronic swelling in her legs. This has been discussed at previous OVs. She was referred to IR for evaluation. Patient has not yet followed up with Nelda Vu's office for initial assessment. She had u/s of BLE 2013 which showed no evidence of reflux in the greater or lesser saphenous veins. Review of Systems   Constitutional: Positive for activity change and fatigue. Negative for appetite change, chills, diaphoresis and fever. Respiratory: Negative for cough, chest tightness and shortness of breath. Cardiovascular: Negative for chest pain, palpitations and leg swelling.    Gastrointestinal: Negative for abdominal distention and abdominal pain. Musculoskeletal: Positive for arthralgias. Neurological: Negative for dizziness, weakness, light-headedness and headaches. Psychiatric/Behavioral: Positive for sleep disturbance. Negative for dysphoric mood. The patient is not nervous/anxious. Prior to Visit Medications    Medication Sig Taking? Authorizing Provider   carvedilol (COREG) 3.125 MG tablet Take 1 tablet by mouth 2 times daily Yes Zoie Ervin PA-C   furosemide (LASIX) 40 MG tablet Take 1 tablet by mouth daily Yes Zoie Ervin PA-C   losartan (COZAAR) 50 MG tablet Take 1 tablet by mouth daily Yes [address] by Does not apply route Yes Zoie Ervin PA-C   Misc. Devices (COMMODE BEDSIDE) MISC Use as needed for toileting Yes Zoie Ervin PA-C   buPROPion (WELLBUTRIN XL) 150 MG extended release tablet Take 1 tablet by mouth every morning Yes Zoie Ervin PA-C   vitamin D (ERGOCALCIFEROL) 1.25 MG (90854 UT) CAPS capsule TAKE 1 CAPSULE BY MOUTH ONE TIME PER WEEK Yes Zoie Ervin PA-C   albuterol sulfate  (90 Base) MCG/ACT inhaler Inhale 2 puffs into the lungs 2 times daily Yes Zoie Ervin PA-C   nystatin (MYCOSTATIN) 217764 UNIT/GM cream Apply topically 2 times daily. Zoie Ervin PA-C   nystatin (MYCOSTATIN) 960548 UNIT/GM powder Apply 3 times daily.   Flordia Kayser Fior, PA-C       Social History     Tobacco Use    Smoking status: Current Every Day Smoker     Packs/day: 1.00     Types: Cigarettes     Start date: 5/6/1979    Smokeless tobacco: Never Used   Substance Use Topics    Alcohol use: No    Drug use: No      Allergies   Allergen Reactions    Food Hives     Patient still eats strawberries    Other Rash     grass   ,   Past Medical History:   Diagnosis Date    Anxiety     Asthma     [facility]est[facility]     Chronic back pain     COPD (chronic obstructive pulmonary disease) ([facility])     Depression     Hypertension     Obesity     Pelvic pain in female 2/15/2018    Type 2 diabetes mellitus without complication ([facility] [address] 6/4/3924   ,   Past Surgical History:   Procedure Laterality Date    BLADDER SUSPENSION  02/2014    CARPAL TUNNEL RELEASE  89 or 90    right hand     HYSTERECTOMY  2009    full    [place] CMBND ANTERPOST COLPORRAPHY W/CYSTO W/NTRCL RPR N/A 2/19/2018    EXCISION OF VAGINAL MESH, CYSTOSCOPY performed by Min Varela DO at [facility]      27 yrs ago   ,   Social History     Tobacco Use    Smoking status: Current Every Day Smoker     Packs/day: 1.00     Types: Cigarettes     Start date: 5/6/1979    Smokeless tobacco: Never Used   Substance Use Topics    Alcohol use: No    Drug use: No   ,   Family History   Problem Relation Age of Onset    Alzheimer's Disease Father     High Blood Pressure Father     Stroke Father         6 strokes in one year   Ashlyn Guatemalan Asthma Sister     Asthma Brother     No Known Problems Daughter     No Known Problems Daughter    ,   There is no immunization history on file for this patient.,   Health Maintenance   Topic Date Due    HIV screen  05/06/1981    DTaP/Tdap/Td vaccine (1 - Tdap) 05/06/1985    Breast cancer screen  05/06/2016    Shingles Vaccine (1 of 2) 05/06/2016    Cervical cancer screen  11/22/2016    Pneumococcal 0-64 years Vaccine (1 of 1 - PPSV23) 07/24/2020 (Originally 5/6/1972)    Flu vaccine (Season Ended) 01/24/2021 (Originally 9/1/2020)    Annual Wellness Visit (AWV)  01/31/2021    Potassium monitoring  05/26/2021    Creatinine monitoring  05/26/2021    Colon cancer screen fecal DNA test (Cologuard)  02/25/2023    Lipid screen  01/24/2025    Hepatitis A vaccine  Aged Out    Hepatitis B vaccine  Aged Out    Hib vaccine  Aged Out    Meningococcal (ACWY) vaccine  Aged Out       PHYSICAL EXAMINATION:  [ INSTRUCTIONS:  \"[x]\" Indicates a positive item  \"[]\" Indicates a negative item  -- DELETE ALL ITEMS NOT EXAMINED]  [] Alert  [] Oriented to person/place/time    [] No apparent distress  [] Toxic appearing    [] Face flushed appearing [] Sclera clear  [] Lips are cyanotic      [] Breathing appears normal  [] Appears tachypneic      [] Rash on visible skin    [] Cranial Nerves II-XII grossly intact    [] Motor grossly intact in visible upper extremities    [] Motor grossly intact in visible lower extremities    [] Normal Mood  [] Anxious appearing    [] Depressed appearing  [] Confused appearing      [] Poor short term memory  [] Poor long term memory    [] OTHER:      Due to this being a TeleHealth encounter, evaluation of the following organ systems is limited: Vitals/Constitutional/EENT/Resp/CV/GI//MS/Neuro/Skin/Heme-Lymph-Imm. ASSESSMENT/PLAN:  1. Lower extremity edema  Discussed medications today. Spironolactone and zaroxolyn stopped. Losartan and lasix continued. Monitor. Follow up with IR for lymphedema evaluation.     - furosemide (LASIX) 40 MG tablet; Take 1 tablet by mouth daily  Dispense: 30 tablet; Refill: 5    2. Diastolic dysfunction  Follow up with cardiology. - losartan (COZAAR) 50 MG tablet; Take 1 tablet by mouth daily  Dispense: 30 tablet; Refill: 5    3. Essential hypertension    - carvedilol (COREG) 3.125 MG tablet; Take 1 tablet by mouth 2 times daily  Dispense: 60 tablet; Refill: 3  - losartan (COZAAR) 50 MG tablet; Take 1 tablet by mouth daily  Dispense: 30 tablet; Refill: 5    4. Coronary artery disease involving native coronary artery of native heart, angina presence unspecified    - carvedilol (COREG) 3.125 MG tablet; Take 1 tablet by mouth 2 times daily  Dispense: 60 tablet; Refill: 3    5. Hospital discharge follow-up    - NV DISCHARGE MEDS RECONCILED W/ CURRENT OUTPATIENT MED LIST      Return in about 8 days (around 6/5/2020) for follow up. An  electronic signature was used to authenticate this note.     --Dereck Pena PA-C on 5/29/2020 at 8:32 PM        Pursuant to the emergency declaration under the [address]

## 2020-05-29 ASSESSMENT — ENCOUNTER SYMPTOMS
SHORTNESS OF BREATH: 0
ABDOMINAL DISTENTION: 0
ABDOMINAL PAIN: 0
COUGH: 0
CHEST TIGHTNESS: 0

## 2020-06-05 ENCOUNTER — VIRTUAL VISIT (OUTPATIENT)
Dept: FAMILY MEDICINE CLINIC | Age: 55
End: 2020-06-05
Payer: MEDICARE

## 2020-06-05 PROCEDURE — 99214 OFFICE O/P EST MOD 30 MIN: CPT | Performed by: PHYSICIAN ASSISTANT

## 2020-06-05 ASSESSMENT — ENCOUNTER SYMPTOMS
CHEST TIGHTNESS: 0
ABDOMINAL PAIN: 0
SHORTNESS OF BREATH: 0
ABDOMINAL DISTENTION: 0
BACK PAIN: 1
COUGH: 0

## 2020-06-09 ENCOUNTER — TELEPHONE (OUTPATIENT)
Dept: FAMILY MEDICINE CLINIC | Age: 55
End: 2020-06-09

## 2020-06-10 ENCOUNTER — TELEPHONE (OUTPATIENT)
Dept: FAMILY MEDICINE CLINIC | Age: 55
End: 2020-06-10

## 2020-06-10 NOTE — TELEPHONE ENCOUNTER
Patient is calling to let us know, that Callum Anthony from [facility] called, to let her know that the paperwork that we sent to them for the hospital bed is incomplete. Wants someone to take care of her papers soon. Please advise and thanks!

## 2020-06-11 ENCOUNTER — TELEPHONE (OUTPATIENT)
Dept: FAMILY MEDICINE CLINIC | Age: 55
End: 2020-06-11

## 2020-06-16 RX ORDER — FUROSEMIDE 40 MG/1
40 TABLET ORAL DAILY
Qty: 30 TABLET | Refills: 5 | Status: SHIPPED | OUTPATIENT
Start: 2020-06-16 | End: 2020-12-23 | Stop reason: SDUPTHER

## 2020-06-19 ENCOUNTER — VIRTUAL VISIT (OUTPATIENT)
Dept: FAMILY MEDICINE CLINIC | Age: 55
End: 2020-06-19
Payer: MEDICARE

## 2020-06-19 PROBLEM — R09.02 HYPOXIA: Status: ACTIVE | Noted: 2020-06-19

## 2020-06-19 PROBLEM — L30.4 INTERTRIGO: Status: ACTIVE | Noted: 2020-06-19

## 2020-06-19 PROCEDURE — 99214 OFFICE O/P EST MOD 30 MIN: CPT | Performed by: PHYSICIAN ASSISTANT

## 2020-06-19 RX ORDER — LOSARTAN POTASSIUM 50 MG/1
50 TABLET ORAL DAILY
Qty: 30 TABLET | Refills: 5 | Status: SHIPPED | OUTPATIENT
Start: 2020-06-19 | End: 2020-09-15 | Stop reason: DRUGHIGH

## 2020-06-19 RX ORDER — WEIGH SCALE
MISCELLANEOUS MISCELLANEOUS
Qty: 1 EACH | Refills: 0 | Status: SHIPPED | OUTPATIENT
Start: 2020-06-19 | End: 2020-09-03

## 2020-06-19 RX ORDER — CARVEDILOL 3.12 MG/1
3.12 TABLET ORAL 2 TIMES DAILY
Qty: 60 TABLET | Refills: 3 | Status: SHIPPED | OUTPATIENT
Start: 2020-06-19 | End: 2020-10-12

## 2020-06-19 RX ORDER — NYSTATIN 100000 U/G
CREAM TOPICAL
Qty: 30 G | Refills: 2 | Status: ON HOLD | OUTPATIENT
Start: 2020-06-19 | End: 2021-06-12

## 2020-06-19 RX ORDER — NYSTATIN 100000 [USP'U]/G
POWDER TOPICAL
Qty: 60 G | Refills: 2 | Status: SHIPPED | OUTPATIENT
Start: 2020-06-19 | End: 2021-03-26 | Stop reason: SDUPTHER

## 2020-06-19 ASSESSMENT — ENCOUNTER SYMPTOMS
ABDOMINAL DISTENTION: 0
BACK PAIN: 1
COUGH: 0
ABDOMINAL PAIN: 0
SHORTNESS OF BREATH: 0
CHEST TIGHTNESS: 0

## 2020-06-19 ASSESSMENT — PATIENT HEALTH QUESTIONNAIRE - PHQ9
1. LITTLE INTEREST OR PLEASURE IN DOING THINGS: 0
SUM OF ALL RESPONSES TO PHQ9 QUESTIONS 1 & 2: 0
SUM OF ALL RESPONSES TO PHQ QUESTIONS 1-9: 0
SUM OF ALL RESPONSES TO PHQ QUESTIONS 1-9: 0
2. FEELING DOWN, DEPRESSED OR HOPELESS: 0

## 2020-06-19 NOTE — PROGRESS NOTES
2020    TELEHEALTH EVALUATION -- Audio/Visual (During YWKPX-90 public health emergency)    Due to [place] 19 outbreak, patient's office visit was converted to a virtual visit. Patient was contacted and agreed to proceed with a virtual visit via Telephone Visit--total length of visit time: 20 minutes  The risks and benefits of converting to a virtual visit were discussed in light of the current infectious disease epidemic. Patient also understood that insurance coverage and co-pays are up to their individual insurance plans. HPI:    Adrain Fothergill (:  1965) has requested an audio/video evaluation for the following concern(s):    CHF/CAD. 3 L of oxygen at all times. Notes improvement in her breathing/fatigue. Remains compliant with her medications. Has follow up with cardiology on Monday, in-office. Asking for a scale for daily weights. Risk of CHF exacerbation, she would like to monitor. Continues on lasix therapy. KARISSA. Still needs replacement face mask. Sleeping with 3L of oxygen--has noticed improvement in her daytime sleepiness; sleeping better with supplemental oxygen. BMI 57. Has cut out soda completely, doing well with wellbutrin--this has helped curb her appetite. Eating less. Need for hospital bed. Bedroom is upstairs, patient is short of breath with ascending/descending stairs. Unable to transport oxygen tank upstairs, tubing not long enough. Patient would benefit from bed in her main living area as she is currently sleeping in a recliner chair next to her oxygen. The patient requires head of the bed to be elevated more than 30 degrees most of the time due to CHF. Intertrigo. Requesting refill of nystatin cream/powder. With warmer weather, skin within skin folds is warm/sweaty. Creates yeast rash/growth. Review of Systems   Constitutional: Positive for activity change and fatigue. Negative for appetite change, chills, diaphoresis and fever.    Respiratory: Negative for cough, chest tightness and shortness of breath. Cardiovascular: Negative for chest pain, palpitations and leg swelling. Gastrointestinal: Negative for abdominal distention and abdominal pain. Genitourinary: Negative for dysuria, hematuria, pelvic pain, urgency and vaginal pain. Musculoskeletal: Positive for arthralgias and back pain. Negative for myalgias. Chronic   Skin: Positive for rash. Neurological: Negative for dizziness, weakness, light-headedness and headaches. Psychiatric/Behavioral: Positive for sleep disturbance (improving.). Negative for dysphoric mood. The patient is not nervous/anxious. Prior to Visit Medications    Medication Sig Taking? Authorizing Provider   carvedilol (COREG) 3.125 MG tablet Take 1 tablet by mouth 2 times daily Yes Zoie Ervin PA-C   losartan (COZAAR) 50 MG tablet Take 1 tablet by mouth daily Yes Zoie Ervin PA-C   nystatin (MYCOSTATIN) 047825 UNIT/GM cream Apply topically 2 times daily. Yes Zoie Ervin PA-C   nystatin (MYCOSTATIN) 259978 UNIT/GM powder Apply 3 times daily. Yes Otf Castro PA-C   Misc. Devices (PROFIT PRECISION SCALE) MISC To check weight daily; monitor fluid gain. Yes Zoie Ervin PA-C   furosemide (LASIX) 40 MG tablet Take 1 tablet by mouth daily Yes Zoie Ervin PA-C   buPROPion (WELLBUTRIN XL) 150 MG extended release tablet Take 1 tablet by mouth every morning Yes Zoie Ervin PA-C   vitamin D (ERGOCALCIFEROL) 1.25 MG (21459 UT) CAPS capsule TAKE 1 CAPSULE BY MOUTH ONE TIME PER WEEK Yes Zoie Ervin PA-C   albuterol sulfate  (90 Base) MCG/ACT inhaler Inhale 2 puffs into the lungs 2 times daily Yes Murali Ervin, [address] Does not apply route  Otf Castro PA-C       Social History     Tobacco Use    Smoking status: Current Every Day Smoker     Packs/day: 1.00     Types: Cigarettes     Start date: 5/6/1979    Smokeless tobacco: Never Used   Substance Use Topics    Alcohol use:  No (Jamee)  02/25/2023    Lipid screen  01/24/2025    Hepatitis A vaccine  Aged Out    Hepatitis B vaccine  Aged Out    Hib vaccine  Aged Out    Meningococcal (ACWY) vaccine  Aged Out     PHYSICAL EXAMINATION:  [ INSTRUCTIONS:  \"[x]\" Indicates a positive item  \"[]\" Indicates a negative item  -- DELETE ALL ITEMS NOT EXAMINED]  [x] Alert  [x] Oriented to person/place/time    [x] No apparent distress  [] Toxic appearing    [] Face flushed appearing [] Sclera clear  [] Lips are cyanotic      [] Breathing appears normal  [] Appears tachypneic      [] Rash on visible skin    [x] Cranial Nerves II-XII grossly intact    [] Motor grossly intact in visible upper extremities    [] Motor grossly intact in visible lower extremities    [x] Normal Mood  [] Anxious appearing    [] Depressed appearing  [] Confused appearing      [] Poor short term memory  [] Poor long term memory    [] OTHER:      Due to this being a TeleHealth encounter, evaluation of the following organ systems is limited: Vitals/Constitutional/EENT/Resp/CV/GI//MS/Neuro/Skin/Heme-Lymph-Imm. ASSESSMENT/PLAN:  1. Heart failure, unspecified HF chronicity, unspecified heart failure type ([facility] [address]  Compensated with current medication regimen. Has follow up with cardiology. Order for bariatric scale to monitor weight gain. - losartan (COZAAR) 50 MG tablet; Take 1 tablet by mouth daily  Dispense: 30 tablet; Refill: 5  - Misc. Devices (PROFIT PRECISION SCALE) MISC; To check weight daily; monitor fluid gain. Dispense: 1 each; Refill: 0    2. Essential hypertension  Stable. - carvedilol (COREG) 3.125 MG tablet; Take 1 tablet by mouth 2 times daily  Dispense: 60 tablet; Refill: 3  - losartan (COZAAR) 50 MG tablet; Take 1 tablet by mouth daily  Dispense: 30 tablet; Refill: 5    3. Coronary artery disease involving native coronary artery of native heart, angina presence unspecified  Stable. - carvedilol (COREG) 3.125 MG tablet;  Take 1 tablet by mouth 2

## 2020-06-25 ENCOUNTER — TELEPHONE (OUTPATIENT)
Dept: FAMILY MEDICINE CLINIC | Age: 55
End: 2020-06-25

## 2020-07-06 ENCOUNTER — HOSPITAL ENCOUNTER (OUTPATIENT)
Dept: SLEEP CENTER | Age: 55
Discharge: HOME | End: 2020-07-08
Payer: MEDICARE

## 2020-07-06 PROCEDURE — 95806 SLEEP STUDY UNATT&RESP EFFT: CPT | Performed by: INTERNAL MEDICINE

## 2020-07-06 PROCEDURE — 95800 SLP STDY UNATTENDED: CPT

## 2020-07-24 ENCOUNTER — TELEPHONE (OUTPATIENT)
Dept: FAMILY MEDICINE CLINIC | Age: 55
End: 2020-07-24

## 2020-07-28 NOTE — TELEPHONE ENCOUNTER
I called [facility] and they are faxing what is needed.   The notes do hot qualify per the patients insurance

## 2020-08-10 ENCOUNTER — TELEPHONE (OUTPATIENT)
Dept: FAMILY MEDICINE CLINIC | Age: 55
End: 2020-08-10

## 2020-08-27 ENCOUNTER — TELEPHONE (OUTPATIENT)
Dept: FAMILY MEDICINE CLINIC | Age: 55
End: 2020-08-27

## 2020-08-27 NOTE — TELEPHONE ENCOUNTER
Patient called the notes for the hospital bed. The wording needs to specifically say her head needs to be up atleast 30 degrees due to her asthma, congestive heart failure ext. ...       The insurance will not approve without this

## 2020-09-03 ENCOUNTER — VIRTUAL VISIT (OUTPATIENT)
Dept: FAMILY MEDICINE CLINIC | Age: 55
End: 2020-09-03
Payer: MEDICARE

## 2020-09-03 PROCEDURE — 99214 OFFICE O/P EST MOD 30 MIN: CPT | Performed by: PHYSICIAN ASSISTANT

## 2020-09-03 ASSESSMENT — ENCOUNTER SYMPTOMS
SHORTNESS OF BREATH: 0
BACK PAIN: 1
ABDOMINAL DISTENTION: 0
ABDOMINAL PAIN: 0
CHEST TIGHTNESS: 0
COUGH: 0

## 2020-09-03 NOTE — PROGRESS NOTES
9/3/2020    TELEHEALTH EVALUATION -- Audio/Visual (During DXNSB-72 public health emergency)    Due to COVID 19 outbreak, patient's office visit was converted to a virtual visit. Patient was contacted and agreed to proceed with a virtual visit via Telephone Visit  The risks and benefits of converting to a virtual visit were discussed in light of the current infectious disease epidemic. Patient also understood that insurance coverage and co-pays are up to their individual insurance plans. HPI:    Shena Keller (:  1965) has requested an audio/video evaluation for the following concern(s):    On virtual visit today for follow up. Last OV: 2020 via VV. Due for routine labs. Due for mammogram.    CHF/CAD. 3 L of oxygen at all times. Notes improvement in her breathing/fatigue. Remains compliant with her medications. Still needs to follow up with cardiology. Continues on lasix therapy.     KARISSA. Sleeping with 3L of oxygen--has noticed improvement in her daytime sleepiness; sleeping better with supplemental oxygen.      BMI 57. Has cut out soda completely, doing well with wellbutrin--this has helped curb her appetite. Eating less. Does not have current weight to report today.      Need for hospital bed. Bedroom is upstairs, patient is short of breath with ascending/descending stairs. Unable to transport oxygen tank upstairs, tubing not long enough. Patient would benefit from bed in her main living area as she is currently sleeping in a recliner chair next to her oxygen. She is at risk of CHF exacerbation. The patient requires head of the bed to be elevated more than 30 degrees most of the time due to CHF.     Intertrigo. Resolved with nystatin. Review of Systems   Constitutional: Negative for activity change, appetite change, chills, diaphoresis, fatigue and fever. Respiratory: Negative for cough, chest tightness and shortness of breath.     Cardiovascular: Negative for chest pain, palpitations and leg swelling. Gastrointestinal: Negative for abdominal distention and abdominal pain. Genitourinary: Negative for dysuria, hematuria, pelvic pain, urgency and vaginal pain. Musculoskeletal: Positive for arthralgias and back pain. Negative for myalgias. Chronic   Skin: Negative for rash. Neurological: Negative for dizziness, weakness, light-headedness and headaches. Psychiatric/Behavioral: Positive for sleep disturbance (improving.). Negative for dysphoric mood. The patient is not nervous/anxious. Prior to Visit Medications    Medication Sig Taking? Authorizing Provider   buPROPion (WELLBUTRIN XL) 150 MG extended release tablet TAKE 1 TABLET BY MOUTH EVERY DAY IN THE MORNING Yes Zoie Ervin PA-C   carvedilol (COREG) 3.125 MG tablet Take 1 tablet by mouth 2 times daily Yes Zoie Ervin PA-C   losartan (COZAAR) 50 MG tablet Take 1 tablet by mouth daily Yes Zoie Ervin PA-C   nystatin (MYCOSTATIN) 908090 UNIT/GM cream Apply topically 2 times daily. Yes Zoie Ervin PA-C   nystatin (MYCOSTATIN) 578132 UNIT/GM powder Apply 3 times daily.  Yes Zoie Ervin PA-C   furosemide (LASIX) 40 MG tablet Take 1 tablet by mouth daily Yes Raina Ervin, [address] by Does not apply route Yes Zoie Ervin PA-C   vitamin D (ERGOCALCIFEROL) 1.25 MG (34543 UT) CAPS capsule TAKE 1 CAPSULE BY MOUTH ONE TIME PER WEEK Yes Zoie Ervin PA-C   albuterol sulfate  (90 Base) MCG/ACT inhaler Inhale 2 puffs into the lungs 2 times daily Yes Zoie Ervin PA-C       Social History     Tobacco Use    Smoking status: Current Every Day Smoker     Packs/day: 1.00     Types: Cigarettes     Start date: 5/6/1979    Smokeless tobacco: Never Used   Substance Use Topics    Alcohol use: No    Drug use: No      Allergies   Allergen Reactions    Food Hives     Patient still eats strawberries    Other Rash     grass   ,   Past Medical History:   Diagnosis Date    Anxiety     Asthma     CHF (congestive heart failure) (HCC)     Chronic back pain     COPD [facility]ronic obstructive [facility] disease) [facility] [address] [facility]     Depression     Hypertension     Obesity     Pelvic pain in female 2/15/2018    Type 2 diabetes mellitus without complication ([facility] [address] 0/3/0295   ,   Past Surgical History:   Procedure Laterality Date    BLADDER SUSPENSION  02/2014    CARPAL TUNNEL RELEASE  89 or 90    right hand     HYSTERECTOMY  2009    full    LA CMBND ANTERPOST COLPORRAPHY W/CYSTO W/NTRCL RPR N/A 2/19/2018    EXCISION OF VAGINAL MESH, CYSTOSCOPY performed by John Correa DO at [address] [place] [address]      27 yrs ago   ,   Social History     Tobacco Use    Smoking status: Current Every Day Smoker     Packs/day: 1.00     Types: Cigarettes     Start date: 5/6/1979    Smokeless tobacco: Never Used   Substance Use Topics    Alcohol use: No    Drug use: No   ,   Family History   Problem Relation Age of Onset    Alzheimer's Disease Father     High Blood Pressure Father     Stroke Father         6 strokes in one year   Dionicio Cali Asthma Sister     Asthma Brother     No Known Problems Daughter     No Known Problems Daughter    ,   There is no immunization history on file for this patient.,   Health Maintenance   Topic Date Due    Pneumococcal 0-64 years Vaccine (1 of 1 - PPSV23) 05/06/1972    HIV screen  05/06/1981    DTaP/Tdap/Td vaccine (1 - Tdap) 05/06/1985    Breast cancer screen  05/06/2016    Shingles Vaccine (1 of 2) 05/06/2016    Cervical cancer screen  11/22/2016    Flu vaccine (1) 09/01/2020    Annual Wellness Visit (AWV)  01/31/2021    Potassium monitoring  05/26/2021    Creatinine monitoring  05/26/2021    Colon cancer screen fecal DNA test (Cologuard)  02/25/2023    Lipid screen  01/24/2025    Hepatitis A vaccine  Aged Out    Hepatitis B vaccine  Aged Out    Hib vaccine  Aged Out    Meningococcal (ACWY) vaccine  Aged Out       PHYSICAL EXAMINATION:  [ INSTRUCTIONS:  \"[x]\" Indicates a positive item \"[]\" Indicates a negative item  -- DELETE ALL ITEMS NOT EXAMINED]  [x] Alert  [x] Oriented to person/place/time    [x] No apparent distress  [] Toxic appearing    [] Face flushed appearing [] Sclera clear  [] Lips are cyanotic      [x] Breathing appears normal  [] Appears tachypneic      [] Rash on visible skin    [] Cranial Nerves II-XII grossly intact    [] Motor grossly intact in visible upper extremities    [] Motor grossly intact in visible lower extremities    [x] Normal Mood  [] Anxious appearing    [] Depressed appearing  [] Confused appearing      [] Poor short term memory  [] Poor long term memory    [] OTHER:      Due to this being a TeleHealth encounter, evaluation of the following organ systems is limited: Vitals/Constitutional/EENT/Resp/CV/GI//MS/Neuro/Skin/Heme-Lymph-Imm. ASSESSMENT/PLAN:  1. Chronic heart failure, unspecified heart failure type ([facility] [address]  Compensated with current medications. Needs cardiology follow up. 2. LVH (left ventricular hypertrophy)    3. Chronic obstructive pulmonary disease, unspecified COPD type ([address]  Stable. 4. Hypoxia  On 3 L of continuous oxygen. 5. KARISSA (obstructive sleep apnea)  Not on CPAP at this time. 6. Intertrigo  Resolved. 7. Breast cancer screening by mammogram.  Mammogram ordered. Needs mammogram, labs, updated vitals. Needs follow up with cardiology. Return in about 4 weeks (around 10/1/2020) for follow up in office. An  electronic signature was used to authenticate this note. --Ej Lopez PA-C on 9/3/2020 at 2:53 PM        Pursuant to the emergency declaration under the [facility] waiver authority and the [employer] Act, this Virtual  Visit was conducted, with patient's consent, to reduce the patient's risk of exposure to COVID-19 and provide continuity of care for an established patient.     Services were provided through a video synchronous discussion virtually to substitute for in-person clinic visit.

## 2020-09-10 ENCOUNTER — TELEPHONE (OUTPATIENT)
Dept: FAMILY MEDICINE CLINIC | Age: 55
End: 2020-09-10

## 2020-09-25 ENCOUNTER — TELEPHONE (OUTPATIENT)
Dept: FAMILY MEDICINE CLINIC | Age: 55
End: 2020-09-25

## 2020-09-25 NOTE — TELEPHONE ENCOUNTER
[facility] called to say that they need a new order for a hospital bed. The Rx given in May is not eligable because the progress notes from June are after the Rx was written. The need the Rx to be dated after the notes. Please review the pending order.      Rx will need faxed to  [facility]

## 2020-10-01 ENCOUNTER — VIRTUAL VISIT (OUTPATIENT)
Dept: FAMILY MEDICINE CLINIC | Age: 55
End: 2020-10-01
Payer: MEDICARE

## 2020-10-01 PROBLEM — I11.0 HYPERTENSIVE HEART DISEASE WITH HEART FAILURE (HCC): Status: ACTIVE | Noted: 2020-02-05

## 2020-10-01 PROCEDURE — 99214 OFFICE O/P EST MOD 30 MIN: CPT | Performed by: PHYSICIAN ASSISTANT

## 2020-10-01 ASSESSMENT — ENCOUNTER SYMPTOMS
ABDOMINAL DISTENTION: 0
WHEEZING: 0
BACK PAIN: 1
CHEST TIGHTNESS: 0
COUGH: 0
ABDOMINAL PAIN: 0
SHORTNESS OF BREATH: 1

## 2020-10-01 ASSESSMENT — PATIENT HEALTH QUESTIONNAIRE - PHQ9
1. LITTLE INTEREST OR PLEASURE IN DOING THINGS: 1
2. FEELING DOWN, DEPRESSED OR HOPELESS: 1
SUM OF ALL RESPONSES TO PHQ9 QUESTIONS 1 & 2: 2
SUM OF ALL RESPONSES TO PHQ QUESTIONS 1-9: 2
SUM OF ALL RESPONSES TO PHQ QUESTIONS 1-9: 2

## 2020-10-01 NOTE — PROGRESS NOTES
10/1/2020    TELEHEALTH EVALUATION -- Audio/Visual (During XSKNV-06 public health emergency)    Due to COVID 19 outbreak, patient's office visit was converted to a virtual visit. Patient was contacted and agreed to proceed with a virtual visit via Telephone Visit--total length of call 20 minutes. The risks and benefits of converting to a virtual visit were discussed in light of the current infectious disease epidemic. Patient also understood that insurance coverage and co-pays are up to their individual insurance plans. HPI:    Lily Verma (:  1965) has requested an audio/video evaluation for the following concern(s):    Follow up today via VV. Last OV: 9/3/2020 via VV. CHF/CAD.    3 L of oxygen at all times. Notes improvement in her breathing/fatigue. Remains compliant with her medications. Still needs to follow up with cardiology. Continues on lasix therapy, beta-blocker. Feels like she will have intermittent palpitations. Intermittent increased SOB. Requesting handicap placard due limited PA and prolonged SOB.      KARISSA/hypoxia.    Using continuous oxygen supplementation with 3L of oxygen--has noticed improvement in her daytime sleepiness; sleeping better with supplemental oxygen.      BMI 57.  Has cut out soda completely, doing well with wellbutrin--this has helped curb her appetite. Eating less. Does not have current weight to report today.      Need for hospital bed. Still has not received hospital bed. Bedroom is upstairs, patient is short of breath with ascending/descending stairs.  Unable to transport oxygen tank upstairs, tubing not long enough.  Patient would benefit from bed in her main living area as she is currently sleeping in a recliner chair next to her oxygen. She is at risk of CHF exacerbation.  The patient requires head of the bed to be elevated more than 30 degrees most of the time due to CHF and increased SOB with laying lower than 30 degrees.     Review of Systems   Constitutional: Positive for activity change and fatigue. Negative for appetite change, chills, diaphoresis and fever. Respiratory: Positive for shortness of breath. Negative for cough, chest tightness and wheezing. Cardiovascular: Negative for chest pain, palpitations and leg swelling. Gastrointestinal: Negative for abdominal distention and abdominal pain. Genitourinary: Negative for dysuria, hematuria, pelvic pain, urgency and vaginal pain. Musculoskeletal: Positive for arthralgias and back pain. Negative for myalgias. Chronic   Skin: Negative for rash. Neurological: Negative for dizziness, weakness, light-headedness and headaches. Psychiatric/Behavioral: Positive for sleep disturbance (improving.). Negative for dysphoric mood. The patient is not nervous/anxious. Prior to Visit Medications    Medication Sig Taking? Authorizing Provider   Misc. Devices (BARIATRIC ROLLATOR) MISC To help with ambulation related to CHF/shortness of breath with activity. Yes Zoie Ervin PA-C   Handicap Placard MISC by Does not apply route Good for 5 years. Expires: 10/2/2025. Yes [address] [place] [address] by Does not apply route  Zoie Ervin PA-C   losartan (COZAAR) 100 MG tablet TAKE 1/2 TABLET BY MOUTH EVERY DAY  Zoie Ervin PA-C   buPROPion (WELLBUTRIN XL) 150 MG extended release tablet TAKE 1 TABLET BY MOUTH EVERY DAY IN THE MORNING  Zoie Ervin PA-C   carvedilol (COREG) 3.125 MG tablet Take 1 tablet by mouth 2 times daily  Zoie Ervin PA-C   nystatin (MYCOSTATIN) 200341 UNIT/GM cream Apply topically 2 times daily. Zoie Ervin PA-C   nystatin (MYCOSTATIN) 885771 UNIT/GM powder Apply 3 times daily.   Zoie Ervin PA-C   furosemide (LASIX) 40 MG tablet Take 1 tablet by mouth daily  Zoie Ervin PA-C   vitamin D (ERGOCALCIFEROL) 1.25 MG (16140 UT) CAPS capsule TAKE 1 CAPSULE BY MOUTH ONE TIME PER WEEK  Zoie Ervin PA-C   albuterol sulfate  (90 Base) MCG/ACT 05/06/2016    Cervical cancer screen  11/22/2016    Flu vaccine (1) 09/01/2020    Annual Wellness Visit (AWV)  01/31/2021    Potassium monitoring  05/26/2021    Creatinine monitoring  05/26/2021    Colon cancer screen fecal DNA test (Cologuard)  02/25/2023    Lipid screen  01/24/2025    Hepatitis A vaccine  Aged Out    Hepatitis B vaccine  Aged Out    Hib vaccine  Aged Out    Meningococcal (ACWY) vaccine  Aged Out       PHYSICAL EXAMINATION:  [ INSTRUCTIONS:  \"[x]\" Indicates a positive item  \"[]\" Indicates a negative item  -- DELETE ALL ITEMS NOT EXAMINED]  [x] Alert  [x] Oriented to person/place/time    [x] No apparent distress  [] Toxic appearing    [] Face flushed appearing [] Sclera clear  [] Lips are cyanotic      [x] Breathing appears normal  [] Appears tachypneic      [] Rash on visible skin    [] Cranial Nerves II-XII grossly intact    [] Motor grossly intact in visible upper extremities    [] Motor grossly intact in visible lower extremities    [x] Normal Mood  [] Anxious appearing    [] Depressed appearing  [] Confused appearing      [] Poor short term memory  [] Poor long term memory    [] OTHER:      Due to this being a TeleHealth encounter, evaluation of the following organ systems is limited: Vitals/Constitutional/EENT/Resp/CV/GI//MS/Neuro/Skin/Heme-Lymph-Imm. ASSESSMENT/PLAN:  1. Hypertensive heart disease with heart failure (HCC)     - Brain Natriuretic Peptide; Future  - Misc. Devices (BARIATRIC ROLLATOR) MISC; To help with ambulation related to CHF/shortness of breath with activity. Dispense: 1 each; Refill: 0  - Handicap Placard MISC; by Does not apply route Good for 5 years. Expires: 10/2/2025. Dispense: 2 each; Refill: 0    2. BMI 50.0-59.9, adult ([facility] [address]    3. Diastolic dysfunction  - CBC Auto Differential; Future  - Comprehensive Metabolic Panel; Future  - Brain Natriuretic Peptide; Future  - Misc. Devices (BARIATRIC ROLLATOR) MISC;  To help with ambulation related to CHF/shortness of breath with activity. Dispense: 1 each; Refill: 0  - Handicap Placard MISC; by Does not apply route Good for 5 years. Expires: 10/2/2025. Dispense: 2 each; Refill: 0    4. Essential hypertension  - CBC Auto Differential; Future  - Comprehensive Metabolic Panel; Future    5. Coronary artery disease involving native coronary artery of native heart, angina presence unspecified  - CBC Auto Differential; Future  - Comprehensive Metabolic Panel; Future    6. Vitamin D deficiency  - Vitamin D 25 Hydroxy; Future    7. Hypoxia  Continue 3 L supplemental oxygen. 8. KARISSA (obstructive sleep apnea)  On supplemental oxygen. 9. Encounter for screening mammogram for malignant neoplasm of breast  - JAMIR DIGITAL SCREEN W OR WO CAD BILATERAL; Future      Return in about 2 weeks (around 10/15/2020) for follow up in office please . An  electronic signature was used to authenticate this note. --Funmi Michelle PA-C on 10/1/2020 at 12:43 PM        Pursuant to the emergency declaration under the [facility], [facility] waiver authority and the [employer] Act, this Virtual  Visit was conducted, with patient's consent, to reduce the patient's risk of exposure to COVID-19 and provide continuity of care for an established patient. Services were provided through a video synchronous discussion virtually to substitute for in-person clinic visit.

## 2020-10-05 ENCOUNTER — TELEPHONE (OUTPATIENT)
Dept: FAMILY MEDICINE CLINIC | Age: 55
End: 2020-10-05

## 2020-10-05 NOTE — TELEPHONE ENCOUNTER
Patient need a new Rx for hospital bed and a walker with a seat to be sent to [address]. Please advise and thanks!

## 2020-10-07 NOTE — TELEPHONE ENCOUNTER
Rx request   Requested Prescriptions     Pending Prescriptions Disp [address] Bed MISC 1 each 0     Sig: by Does not apply route     LOV 10/1/2020  Next Visit Date:  Future Appointments   Date Time Provider Pino Mcintosh   10/20/2020  3:00 PM Aidee Diane PA-C [address]

## 2020-10-13 ENCOUNTER — TELEPHONE (OUTPATIENT)
Dept: FAMILY MEDICINE CLINIC | Age: 55
End: 2020-10-13

## 2020-10-13 NOTE — TELEPHONE ENCOUNTER
Patient called she is requesting a gel cover for her hospital bed. She advised that it is very hard    Not sure how to order .

## 2020-10-14 ENCOUNTER — VIRTUAL VISIT (OUTPATIENT)
Dept: FAMILY MEDICINE CLINIC | Age: 55
End: 2020-10-14
Payer: MEDICARE

## 2020-10-14 PROCEDURE — 99441 PR PHYS/QHP TELEPHONE EVALUATION 5-10 MIN: CPT | Performed by: NURSE PRACTITIONER

## 2020-10-14 RX ORDER — ERGOTAMINE TARTRATE AND CAFFEINE 1; 100 MG/1; MG/1
TABLET, FILM COATED ORAL
Qty: 30 TABLET | Refills: 0 | Status: SHIPPED | OUTPATIENT
Start: 2020-10-14 | End: 2022-02-24

## 2020-10-14 ASSESSMENT — ENCOUNTER SYMPTOMS
VISUAL CHANGE: 0
EYE WATERING: 1
EYE REDNESS: 0
BLURRED VISION: 0
PHOTOPHOBIA: 0
VOMITING: 0
RHINORRHEA: 1

## 2020-10-14 NOTE — PROGRESS NOTES
This visit began at 12:45    The location for this appointment was the [facility] primary care site. TELEHEALTH APPOINTMENT  Patient has been screened to determine that this visit qualifies for a \"Video Visit\". This visit was via video due to the restrictions of the COVID-19 pandemic. All issues as below were discussed and addressed but note a limited visually based physical exam was performed. If it was felt the patient should be evaluated in the clinic there will be comment below demonstrating they were directed there. The patient is aware and has given verbal consent to be billed for this video encounter. The resources used for this visit were [employer] for chart access and telephone    Chief Complaint   Patient presents with    Headache       Headache    This is a new problem. Episode onset: 2 weeks. The pain is located in the occipital (right sided retro orbital) region. The quality of the pain is described as aching and shooting (constant ache with shooting pain in eye). The pain is at a severity of 9/10 (when shooting pain occurs; happens episodically for periods thorughout the day). Associated symptoms include eye watering (right) and rhinorrhea. Pertinent negatives include no blurred vision, eye redness, photophobia, visual change or vomiting. She has tried NSAIDs, acetaminophen and Excedrin for the symptoms. There is no history of cluster headaches, migraine headaches or migraines in the family. PMH:    Current Outpatient Medications on File Prior to Visit   Medication Sig Dispense Refill    carvedilol (COREG) 3.125 MG tablet TAKE 1 TABLET BY MOUTH TWICE A  tablet 1   [address] by Does not apply route 1 each 0    Misc. Devices (BARIATRIC ROLLATOR) MISC To help with ambulation related to CHF/shortness of breath with activity. 1 each 0    Handicap Placard MISC by Does not apply route Good for 5 years.   Expires: 10/2/2025. 2 each 0    losartan (COZAAR) 100 1.00     Types: Cigarettes     Start date: 5/6/1979    Smokeless tobacco: Never Used   Substance and Sexual Activity    Alcohol use: No    Drug use: No    Sexual activity: Yes     Partners: Male   Lifestyle    Physical activity     Days per week: Not on file     Minutes per session: Not on file    Stress: Not on file   Relationships    Social connections     Talks on phone: Not on file     Gets together: Not on file     Attends Faith service: Not on file     Active member of club or organization: Not on file     Attends meetings of clubs or organizations: Not on file     Relationship status: Not on file    Intimate partner violence     Fear of current or ex partner: Not on file     Emotionally abused: Not on file     Physically abused: Not on file     Forced sexual activity: Not on file   Other Topics Concern    Not on file   Social History Narrative    Not on file     Family History   Problem Relation Age of Onset    Alzheimer's Disease Father     High Blood Pressure Father     Stroke Father         11 strokes in one year    Asthma Sister     Asthma Brother     No Known Problems Daughter     No Known Problems Daughter      Allergies:  Food and Other    Review of Systems   HENT: Positive for rhinorrhea. Eyes: Negative for blurred vision, photophobia and redness. Gastrointestinal: Negative for vomiting. Neurological: Positive for headaches. PHYSICAL EXAM/ RESULTS    (Limited exam: telephone only)    LMP  (LMP Unknown)         Physical Exam    No results found for this or any previous visit (from the past 2016 hour(s)). Assessment:       Diagnosis Orders   1. Intractable episodic cluster headache  ergotamine-caffeine (CAFERGOT) 1-100 MG per tablet         No orders of the defined types were placed in this encounter. Plan:   Return if symptoms worsen or fail to improve, for follow up with your PCP.     Patient Instructions       Patient Education        Cluster Headache: Care Instructions  Your Care Instructions  Cluster headaches are very painful. They happen on one side of the head and often start at night. They can last for 30 minutes to several hours. They usually occur in groups, or clusters, over weeks or months. You may have a stuffy nose and watery eyes during the headaches. The cause of cluster headaches is not known. Medicine may help prevent cluster headaches. You also can try to avoid things that trigger your headaches. Follow-up care is a key part of your treatment and safety. Be sure to make and go to all appointments, and call your doctor if you are having problems. It's also a good idea to know your test results and keep a list of the medicines you take. How can you care for yourself at home? · Watch for new symptoms with a headache. These include fever, weakness or numbness, vision changes, or confusion. They may be signs of a more serious problem. · Be safe with medicines. Take your medicines exactly as prescribed. Call your doctor if you think you are having a problem with your medicine. You will get more details on the specific medicines your doctor prescribes. · If your doctor recommends it, take an over-the-counter pain medicine, such as acetaminophen (Tylenol), ibuprofen (Advil, Motrin), or naproxen (Aleve). Read and follow all instructions on the label. · Do not take two or more pain medicines at the same time unless the doctor told you to. Many pain medicines have acetaminophen, which is Tylenol. Too much acetaminophen (Tylenol) can be harmful. · Carry medicine with you to quickly treat a headache. · Put ice or a cold pack on the area for 10 to 20 minutes at a time. Put a thin cloth between the ice and your skin. · If your doctor prescribed at-home oxygen therapy to stop a cluster headache, follow the directions for using it. To prevent cluster headaches  · Keep a headache diary. Avoiding triggers may help you prevent headaches.  Write down when a headache begins, how long it lasts, and what might have triggered it. This could include stress, alcohol, or certain foods. · Exercise daily to lower stress. · Limit caffeine by not drinking too much coffee, tea, or soda. But do not quit caffeine suddenly. This can also give you headaches. · Do not smoke or allow others to smoke around you. If you need help quitting, talk to your doctor about stop-smoking programs and medicines. These can increase your chances of quitting for good. · Tell your doctor if your headaches get worse and medicines do not help. You may need to try a different medicine. When should you call for help? Call 911 anytime you think you may need emergency care. For example, call if:    · You have symptoms of a stroke. These may include:  ? Sudden numbness, tingling, weakness, or loss of movement in your face, arm, or leg, especially on only one side of your body. ? Sudden vision changes. ? Sudden trouble speaking. ? Sudden confusion or trouble understanding simple statements. ? Sudden problems with walking or balance. ? A sudden, severe headache that is different from past headaches. Call your doctor now or seek immediate medical care if:    · You have a fever with a stiff neck or a severe headache.     · You are sensitive to light or feel very sleepy or confused.     · You have new nausea and vomiting and you cannot keep down food or liquids. Watch closely for changes in your health, and be sure to contact your doctor if:    · You have a headache that does not get better within 1 or 2 days.     · Your headaches get worse or happen more often. Where can you learn more? Go to https://Green AjesicaKeepFu.[employer]. org and sign in to your deltaDNA account. Enter Q990 in the tribalX box to learn more about \"Cluster Headache: Care Instructions. \"     If you do not have an account, please click on the \"Sign Up Now\" link.   Current as of: November 20, 8643               IZAVKCZ Version: 12.6  © 6040-9505 [employer]orporated. Care instructions adapted under license by [facility] ([facility]). If you have questions about a medical condition or this instruction, always ask your healthcare professional. Maurice Noriega any warranty or liability for your use of this information. Side effects and adverse effects of any medication prescribed today, as well as treatment plan/rationale, follow-up care, and result expectations have been discussed with the patient. Expresses understanding and desires to proceed with treatment plan. Discussed signs and symptoms which require immediate follow-up in ED/call to 911. Understanding verbalized. I have reviewed and updated the electronic medical record. As always, if symptoms worsen, go directly to ED. This visit ended at 1:03    The resources used for this visit were [employer] for chart access and telephone    YOLANDA Jett CNP      An  electronic signature was used to authenticate this note. --YOLANDA Rosenthal CNP on 10/14/2020 at [address] to the emergency declaration under the [facility], [facility] authority and the [employer] Act, this Virtual  Visit was conducted, with patient's consent, to reduce the patient's risk of exposure to COVID-19 and provide continuity of care for an established patient. Services were provided through a video synchronous discussion virtually to substitute for in-person clinic visit.

## 2020-10-14 NOTE — PATIENT INSTRUCTIONS
Patient Education        Cluster Headache: Care Instructions  Your Care Instructions  Cluster headaches are very painful. They happen on one side of the head and often start at night. They can last for 30 minutes to several hours. They usually occur in groups, or clusters, over weeks or months. You may have a stuffy nose and watery eyes during the headaches. The cause of cluster headaches is not known. Medicine may help prevent cluster headaches. You also can try to avoid things that trigger your headaches. Follow-up care is a key part of your treatment and safety. Be sure to make and go to all appointments, and call your doctor if you are having problems. It's also a good idea to know your test results and keep a list of the medicines you take. How can you care for yourself at home? · Watch for new symptoms with a headache. These include fever, weakness or numbness, vision changes, or confusion. They may be signs of a more serious problem. · Be safe with medicines. Take your medicines exactly as prescribed. Call your doctor if you think you are having a problem with your medicine. You will get more details on the specific medicines your doctor prescribes. · If your doctor recommends it, take an over-the-counter pain medicine, such as acetaminophen (Tylenol), ibuprofen (Advil, Motrin), or naproxen (Aleve). Read and follow all instructions on the label. · Do not take two or more pain medicines at the same time unless the doctor told you to. Many pain medicines have acetaminophen, which is Tylenol. Too much acetaminophen (Tylenol) can be harmful. · Carry medicine with you to quickly treat a headache. · Put ice or a cold pack on the area for 10 to 20 minutes at a time. Put a thin cloth between the ice and your skin. · If your doctor prescribed at-home oxygen therapy to stop a cluster headache, follow the directions for using it. To prevent cluster headaches  · Keep a headache diary.  Avoiding triggers may help you prevent headaches. Write down when a headache begins, how long it lasts, and what might have triggered it. This could include stress, alcohol, or certain foods. · Exercise daily to lower stress. · Limit caffeine by not drinking too much coffee, tea, or soda. But do not quit caffeine suddenly. This can also give you headaches. · Do not smoke or allow others to smoke around you. If you need help quitting, talk to your doctor about stop-smoking programs and medicines. These can increase your chances of quitting for good. · Tell your doctor if your headaches get worse and medicines do not help. You may need to try a different medicine. When should you call for help? Call 911 anytime you think you may need emergency care. For example, call if:    · You have symptoms of a stroke. These may include:  ? Sudden numbness, tingling, weakness, or loss of movement in your face, arm, or leg, especially on only one side of your body. ? Sudden vision changes. ? Sudden trouble speaking. ? Sudden confusion or trouble understanding simple statements. ? Sudden problems with walking or balance. ? A sudden, severe headache that is different from past headaches. Call your doctor now or seek immediate medical care if:    · You have a fever with a stiff neck or a severe headache.     · You are sensitive to light or feel very sleepy or confused.     · You have new nausea and vomiting and you cannot keep down food or liquids. Watch closely for changes in your health, and be sure to contact your doctor if:    · You have a headache that does not get better within 1 or 2 days.     · Your headaches get worse or happen more often. Where can you learn more? Go to https://The Royal Cellarshaley.Apto. org and sign in to your [employer] account. Enter I741 in the [employer] box to learn more about \"Cluster Headache: Care Instructions. \"     If you do not have an account, please click on the \"Sign Up Now\" link.  Current as of: November 20, 2019               Content Version: 12.6  © 0854-2655 [employer], Incorporated. Care instructions adapted under license by [facility] ([facility]). If you have questions about a medical condition or this instruction, always ask your healthcare professional. [address] any warranty or liability for your use of this information.

## 2020-10-15 NOTE — TELEPHONE ENCOUNTER
Per [facility] the gel cover has to be medically necessary for the patient to get this covered by insurance. She would need an office visit to discuss so they would have the office notes explaining to the insurance.   Then you can order in the system as Misc and document

## 2020-12-22 NOTE — TELEPHONE ENCOUNTER
Rx request   Requested Prescriptions     Pending Prescriptions Disp Refills    furosemide (LASIX) 40 MG tablet 30 tablet 5     Sig: Take 1 tablet by mouth daily     LOV 10/1/2020  Next Visit Date:  No future appointments.

## 2020-12-23 RX ORDER — FUROSEMIDE 40 MG/1
40 TABLET ORAL DAILY
Qty: 30 TABLET | Refills: 5 | Status: SHIPPED | OUTPATIENT
Start: 2020-12-23 | End: 2021-03-26 | Stop reason: ALTCHOICE

## 2021-02-23 ENCOUNTER — VIRTUAL VISIT (OUTPATIENT)
Dept: FAMILY MEDICINE CLINIC | Age: 56
End: 2021-02-23
Payer: MEDICARE

## 2021-02-23 DIAGNOSIS — I25.10 CORONARY ARTERY DISEASE INVOLVING NATIVE CORONARY ARTERY OF NATIVE HEART, ANGINA PRESENCE UNSPECIFIED: ICD-10-CM

## 2021-02-23 DIAGNOSIS — I11.0 HYPERTENSIVE HEART DISEASE WITH HEART FAILURE (HCC): Primary | ICD-10-CM

## 2021-02-23 DIAGNOSIS — G47.33 OSA (OBSTRUCTIVE SLEEP APNEA): ICD-10-CM

## 2021-02-23 DIAGNOSIS — K04.7 DENTAL INFECTION: ICD-10-CM

## 2021-02-23 DIAGNOSIS — R09.02 HYPOXIA: ICD-10-CM

## 2021-02-23 DIAGNOSIS — I51.89 DIASTOLIC DYSFUNCTION: ICD-10-CM

## 2021-02-23 DIAGNOSIS — N39.41 URGE INCONTINENCE OF URINE: ICD-10-CM

## 2021-02-23 DIAGNOSIS — K02.9 DENTAL CARIES: ICD-10-CM

## 2021-02-23 DIAGNOSIS — I10 ESSENTIAL HYPERTENSION: ICD-10-CM

## 2021-02-23 PROCEDURE — 99214 OFFICE O/P EST MOD 30 MIN: CPT | Performed by: PHYSICIAN ASSISTANT

## 2021-02-23 RX ORDER — OXYBUTYNIN CHLORIDE 5 MG/1
5 TABLET, EXTENDED RELEASE ORAL DAILY
Qty: 30 TABLET | Refills: 3 | Status: SHIPPED | OUTPATIENT
Start: 2021-02-23 | End: 2021-06-22

## 2021-02-23 RX ORDER — AMOXICILLIN 875 MG/1
875 TABLET, COATED ORAL 2 TIMES DAILY
Qty: 20 TABLET | Refills: 0 | Status: SHIPPED | OUTPATIENT
Start: 2021-02-23 | End: 2021-03-05

## 2021-02-23 RX ORDER — AMOXICILLIN 875 MG/1
875 TABLET, COATED ORAL 2 TIMES DAILY
Qty: 20 TABLET | Refills: 0 | Status: SHIPPED | OUTPATIENT
Start: 2021-02-23 | End: 2021-02-23

## 2021-02-23 ASSESSMENT — ENCOUNTER SYMPTOMS
BACK PAIN: 1
ABDOMINAL DISTENTION: 0
COUGH: 0
WHEEZING: 0
SHORTNESS OF BREATH: 1
FACIAL SWELLING: 0
ABDOMINAL PAIN: 0
CHEST TIGHTNESS: 0
COLOR CHANGE: 0

## 2021-02-23 NOTE — PROGRESS NOTES
2021    TELEHEALTH EVALUATION -- Audio/Visual (During HSPYV-97 public health emergency)    Due to [place] 19 outbreak, patient's office visit was converted to a virtual visit. Patient was contacted and agreed to proceed with a virtual visit via Telephone Visit--total length of call 15-20 minutes. The risks and benefits of converting to a virtual visit were discussed in light of the current infectious disease epidemic. Patient also understood that insurance coverage and co-pays are up to their individual insurance plans. HPI:    Kenny Mccann (:  1965) has requested an audio/video evaluation for the following concern(s):    Last OV with me: 10/1/2020 via VV. Overdue for lab monitoring and in-office follow up. Dental caries. Going to dentist tomorrow for upper tooth extraction. Has two other bottom teeth that need to be extracted--will need to see oral surgeon. Currently on no type of oral abx therapy. CHF/CAD.    3 L of oxygen at all times. Notes improvement in her breathing/fatigue. Remains compliant with her medications. Still needs to follow up with cardiology.   Continues on lasix therapy, beta-blocker. Feels like she will have intermittent palpitations. Intermittent increased SOB. Has not followed up with cardiology.      KARISSA/hypoxia.    Using continuous oxygen supplementation with 3L of oxygen--has noticed improvement in her daytime sleepiness; sleeping better with supplemental oxygen.      Urge incontinence. C/o increased urge incontinence. Previously was on medication for urge incontinence. Cannot remember name. Would like to restart something. Will have sudden urge, if she can't make it to bathroom, will then have incontinence. Denies hematuria, dysuria. Review of Systems   Constitutional: Positive for activity change and fatigue. Negative for appetite change, chills, diaphoresis and fever. HENT: Positive for dental problem. Negative for facial swelling. Respiratory: Positive for shortness of breath (improved with oxygen). Negative for cough, chest tightness and wheezing. Cardiovascular: Negative for chest pain, palpitations and leg swelling. Gastrointestinal: Negative for abdominal distention and abdominal pain. Genitourinary: Positive for urgency. Negative for difficulty urinating, dysuria, hematuria, pelvic pain and vaginal pain. Musculoskeletal: Positive for arthralgias and back pain. Negative for myalgias. Chronic   Skin: Negative for color change and rash. Neurological: Negative for dizziness, weakness, light-headedness and headaches. Psychiatric/Behavioral: Positive for sleep disturbance (improving.). Negative for dysphoric mood. The patient is not nervous/anxious. Prior to Visit Medications    Medication Sig Taking? Authorizing Provider   amoxicillin (AMOXIL) 875 MG tablet Take 1 tablet by mouth 2 times daily for 10 days Yes Zoie Ervin PA-C   oxybutynin (DITROPAN-XL) 5 MG extended release tablet Take 1 tablet by mouth daily Yes Zoie Ervin PA-C   buPROPion (WELLBUTRIN XL) 150 MG extended release tablet TAKE 1 TABLET BY MOUTH EVERY DAY IN THE MORNING  Zoie Ervin PA-C   furosemide (LASIX) 40 MG tablet Take 1 tablet by mouth daily  Zoie Ervin PA-C   ergotamine-caffeine (CAFERGOT) 1-100 MG per tablet Two tablets at onset of attack; then 1 tablet every 30 minutes as needed; maximum: 6 tablets per attack; do not exceed 10 tablets/week. Ronel Garcia, APRN - CNP   carvedilol (COREG) 3.125 MG tablet TAKE 1 TABLET BY MOUTH TWICE A DAY  Zoie Ervin PA-C   Hospital Bed MISC by Does not apply route  [employer]. Devices (BARIATRIC ROLLATOR) MIS To help with ambulation related to CHF/shortness of breath with activity. Zoie Ervin PA-C   Handicap Placard MISC by Does not apply route Good for 5 years. Expires: 10/2/2025.   Zoie Ervin PA-C   losartan (COZAAR) 100 MG tablet TAKE 1/2 TABLET BY MOUTH EVERY DAY Zoie Ervin PA-C   nystatin (MYCOSTATIN) 391368 UNIT/GM cream Apply topically 2 times daily. Zoie Ervin PA-C   nystatin (MYCOSTATIN) 915255 UNIT/GM powder Apply 3 times daily.   Zoie Ervin PA-C   vitamin D (ERGOCALCIFEROL) 1.25 MG (08465 UT) CAPS capsule TAKE 1 CAPSULE BY MOUTH ONE TIME PER WEEK  Zoie Ervin PA-C   albuterol sulfate  (90 Base) MCG/ACT inhaler Inhale 2 puffs into the lungs 2 times daily  Yoyorylan Hewitt PA-C       Social History     Tobacco Use    Smoking status: Current Every Day Smoker     Packs/day: 1.00     Types: Cigarettes     Start date: 5/6/1979    Smokeless tobacco: Never Used   Substance Use Topics    Alcohol use: No    Drug use: No      Allergies   Allergen Reactions    Food Hives     Patient still eats strawberries    Other Rash     grass   ,   Past Medical History:   Diagnosis Date    Anxiety     Asthma     [facility]est[facility]     Chronic back pain     COPD (chronic obstructive pulmonary disease) ([facility]     Depression     Hypertension     Obesity     Pelvic pain in female 2/15/2018    Type 2 diabetes mellitus without complication ([facility] [address] 9/8/7455   ,   Past Surgical History:   Procedure Laterality Date    BLADDER SUSPENSION  02/2014    CARPAL TUNNEL RELEASE  89 or 89    right hand     HYSTERECTOMY  2009    full    RI CMBND ANTERPOST COLPORRAPHY W/CYSTO W/NTRCL RPR N/A 2/19/2018    EXCISION OF VAGINAL MESH, CYSTOSCOPY performed by Sandra Sahni DO at [facility]      27 yrs ago   ,   Social History     Tobacco Use    Smoking status: Current Every Day Smoker     Packs/day: 1.00     Types: Cigarettes     Start date: 5/6/1979    Smokeless tobacco: Never Used   Substance Use Topics    Alcohol use: No    Drug use: No   ,   Family History   Problem Relation Age of Onset    Alzheimer's Disease Father     High Blood Pressure Father     Stroke Father         11 strokes in one year    Asthma Sister     Asthma Brother    [facility] No Known Problems Daughter     No Known Problems Daughter    ,   There is no immunization history on file for this patient.,   Health Maintenance   Topic Date Due    Hepatitis C screen  1965    Pneumococcal 0-64 years Vaccine (1 of 1 - PPSV23) 05/06/1972    HIV screen  05/06/1981    DTaP/Tdap/Td vaccine (1 - Tdap) 05/06/1985    Breast cancer screen  05/06/2016    Shingles Vaccine (1 of 2) 05/06/2016    Cervical cancer screen  11/22/2016    Annual Wellness Visit (AWV)  06/23/2019    Flu vaccine (1) 09/01/2020    Potassium monitoring  05/26/2021    Creatinine monitoring  05/26/2021    Colon cancer screen fecal DNA test (Cologuard)  02/25/2023    Lipid screen  01/24/2025    Hepatitis A vaccine  Aged Out    Hepatitis B vaccine  Aged Out    Hib vaccine  Aged Out    Meningococcal (ACWY) vaccine  Aged Out       PHYSICAL EXAMINATION:  [ INSTRUCTIONS:  \"[x]\" Indicates a positive item  \"[]\" Indicates a negative item  -- DELETE ALL ITEMS NOT EXAMINED]  [x] Alert  [x] Oriented to person/place/time    [x] No apparent distress  [] Toxic appearing    [] Face flushed appearing [] Sclera clear  [] Lips are cyanotic      [x] Breathing appears normal  [] Appears tachypneic      [] Rash on visible skin    [] Cranial Nerves II-XII grossly intact    [] Motor grossly intact in visible upper extremities    [] Motor grossly intact in visible lower extremities    [x] Normal Mood  [] Anxious appearing    [] Depressed appearing  [] Confused appearing      [] Poor short term memory  [] Poor long term memory    [] OTHER:      Due to this being a TeleHealth encounter, evaluation of the following organ systems is limited: Vitals/Constitutional/EENT/Resp/CV/GI//MS/Neuro/Skin/Heme-Lymph-Imm. ASSESSMENT/PLAN:  1. Hypertensive heart disease with heart failure (HCC)  Continue current medications. Overdue for follow up. 2. Dental infection  Start. - amoxicillin (AMOXIL) 875 MG tablet;  Take 1 tablet by mouth 2 times daily for 10 days  Dispense: 20 tablet; Refill: 0    3. Urge incontinence of urine    - oxybutynin (DITROPAN-XL) 5 MG extended release tablet; Take 1 tablet by mouth daily  Dispense: 30 tablet; Refill: 3    4. Dental caries  Dental extraction tomorrow. 5. Coronary artery disease involving native coronary artery of native heart, angina presence unspecified      6. Essential hypertension      7. Diastolic dysfunction      8. KARISSA (obstructive sleep apnea)      9. Hypoxia        Return in about 4 weeks (around 3/23/2021) for follow up in office. An  electronic signature was used to authenticate this note. --Jorja Apgar, PA-C on 2021 at 3:57 PM        Pursuant to the emergency declaration under the [facility] waiver authority and the [employer] and [employer] Act, this Virtual  Visit was conducted, with patient's consent, to reduce the patient's risk of exposure to COVID-19 and provide continuity of care for an established patient. Services were provided through a video synchronous discussion virtually to substitute for in-person clinic visit.

## 2021-03-26 ENCOUNTER — OFFICE VISIT (OUTPATIENT)
Dept: FAMILY MEDICINE CLINIC | Age: 56
End: 2021-03-26
Payer: MEDICARE

## 2021-03-26 VITALS
HEIGHT: 65 IN | RESPIRATION RATE: 18 BRPM | TEMPERATURE: 97.2 F | DIASTOLIC BLOOD PRESSURE: 82 MMHG | SYSTOLIC BLOOD PRESSURE: 132 MMHG | BODY MASS INDEX: 48.82 KG/M2 | OXYGEN SATURATION: 97 % | WEIGHT: 293 LBS | HEART RATE: 84 BPM

## 2021-03-26 DIAGNOSIS — R60.0 EDEMA OF BOTH LEGS: ICD-10-CM

## 2021-03-26 DIAGNOSIS — I51.89 DIASTOLIC DYSFUNCTION: ICD-10-CM

## 2021-03-26 DIAGNOSIS — I10 ESSENTIAL HYPERTENSION: ICD-10-CM

## 2021-03-26 DIAGNOSIS — L03.116 CELLULITIS OF BOTH LOWER EXTREMITIES: ICD-10-CM

## 2021-03-26 DIAGNOSIS — J44.9 CHRONIC OBSTRUCTIVE PULMONARY DISEASE, UNSPECIFIED COPD TYPE (HCC): ICD-10-CM

## 2021-03-26 DIAGNOSIS — L03.115 CELLULITIS OF BOTH LOWER EXTREMITIES: ICD-10-CM

## 2021-03-26 DIAGNOSIS — R63.5 WEIGHT GAIN: ICD-10-CM

## 2021-03-26 DIAGNOSIS — E88.819 INSULIN RESISTANCE: ICD-10-CM

## 2021-03-26 DIAGNOSIS — I11.0 HYPERTENSIVE HEART DISEASE WITH HEART FAILURE (HCC): ICD-10-CM

## 2021-03-26 DIAGNOSIS — E55.9 VITAMIN D DEFICIENCY: ICD-10-CM

## 2021-03-26 DIAGNOSIS — E88.810 DYSMETABOLIC SYNDROME: ICD-10-CM

## 2021-03-26 DIAGNOSIS — E88.81 DYSMETABOLIC SYNDROME: ICD-10-CM

## 2021-03-26 DIAGNOSIS — Z00.00 ROUTINE GENERAL MEDICAL EXAMINATION AT A HEALTH CARE FACILITY: Primary | ICD-10-CM

## 2021-03-26 DIAGNOSIS — I25.10 CORONARY ARTERY DISEASE INVOLVING NATIVE CORONARY ARTERY OF NATIVE HEART, ANGINA PRESENCE UNSPECIFIED: ICD-10-CM

## 2021-03-26 DIAGNOSIS — R73.03 PREDIABETES: ICD-10-CM

## 2021-03-26 DIAGNOSIS — L30.4 INTERTRIGO: ICD-10-CM

## 2021-03-26 LAB
ALBUMIN SERPL-MCNC: 3.7 G/DL (ref 3.5–4.6)
ALP BLD-CCNC: 113 U/L (ref 40–130)
ALT SERPL-CCNC: 10 U/L (ref 0–33)
ANION GAP SERPL CALCULATED.3IONS-SCNC: 9 MEQ/L (ref 9–15)
AST SERPL-CCNC: 10 U/L (ref 0–35)
BASOPHILS ABSOLUTE: 0 K/UL (ref 0–0.2)
BASOPHILS RELATIVE PERCENT: 0.5 %
BILIRUB SERPL-MCNC: 0.5 MG/DL (ref 0.2–0.7)
BUN BLDV-MCNC: 10 MG/DL (ref 6–20)
CALCIUM SERPL-MCNC: 9.3 MG/DL (ref 8.5–9.9)
CHLORIDE BLD-SCNC: 96 MEQ/L (ref 95–107)
CHOLESTEROL, TOTAL: 200 MG/DL (ref 0–199)
CO2: 36 MEQ/L (ref 20–31)
CREAT SERPL-MCNC: 0.82 MG/DL (ref 0.5–0.9)
EOSINOPHILS ABSOLUTE: 0.1 K/UL (ref 0–0.7)
EOSINOPHILS RELATIVE PERCENT: 0.9 %
GFR AFRICAN AMERICAN: >60
GFR NON-AFRICAN AMERICAN: >60
GLOBULIN: 3.4 G/DL (ref 2.3–3.5)
GLUCOSE BLD-MCNC: 79 MG/DL (ref 70–99)
HBA1C MFR BLD: 5.1 % (ref 4.8–5.9)
HCT VFR BLD CALC: 48.2 % (ref 37–47)
HDLC SERPL-MCNC: 57 MG/DL (ref 40–59)
HEMOGLOBIN: 15.5 G/DL (ref 12–16)
LDL CHOLESTEROL CALCULATED: 113 MG/DL (ref 0–129)
LYMPHOCYTES ABSOLUTE: 1.8 K/UL (ref 1–4.8)
LYMPHOCYTES RELATIVE PERCENT: 20.8 %
MCH RBC QN AUTO: 32 PG (ref 27–31.3)
MCHC RBC AUTO-ENTMCNC: 32.2 % (ref 33–37)
MCV RBC AUTO: 99.5 FL (ref 82–100)
MONOCYTES ABSOLUTE: 0.6 K/UL (ref 0.2–0.8)
MONOCYTES RELATIVE PERCENT: 7.2 %
NEUTROPHILS ABSOLUTE: 6.2 K/UL (ref 1.4–6.5)
NEUTROPHILS RELATIVE PERCENT: 70.6 %
PDW BLD-RTO: 17.5 % (ref 11.5–14.5)
PLATELET # BLD: 224 K/UL (ref 130–400)
POTASSIUM SERPL-SCNC: 4.5 MEQ/L (ref 3.4–4.9)
PRO-BNP: 94 PG/ML
RBC # BLD: 4.84 M/UL (ref 4.2–5.4)
SODIUM BLD-SCNC: 141 MEQ/L (ref 135–144)
TOTAL PROTEIN: 7.1 G/DL (ref 6.3–8)
TRIGL SERPL-MCNC: 149 MG/DL (ref 0–150)
VITAMIN D 25-HYDROXY: 13.8 NG/ML (ref 30–100)
WBC # BLD: 8.7 K/UL (ref 4.8–10.8)

## 2021-03-26 PROCEDURE — 99215 OFFICE O/P EST HI 40 MIN: CPT | Performed by: PHYSICIAN ASSISTANT

## 2021-03-26 PROCEDURE — G0439 PPPS, SUBSEQ VISIT: HCPCS | Performed by: PHYSICIAN ASSISTANT

## 2021-03-26 RX ORDER — POTASSIUM CHLORIDE 20 MEQ/1
20 TABLET, EXTENDED RELEASE ORAL DAILY
Qty: 30 TABLET | Refills: 5 | Status: SHIPPED | OUTPATIENT
Start: 2021-03-26 | End: 2021-04-13 | Stop reason: SDUPTHER

## 2021-03-26 RX ORDER — CLINDAMYCIN HYDROCHLORIDE 300 MG/1
300 CAPSULE ORAL 3 TIMES DAILY
Qty: 30 CAPSULE | Refills: 0 | Status: SHIPPED | OUTPATIENT
Start: 2021-03-26 | End: 2021-04-05

## 2021-03-26 RX ORDER — NYSTATIN 100000 [USP'U]/G
POWDER TOPICAL
Qty: 60 G | Refills: 2 | Status: ON HOLD | OUTPATIENT
Start: 2021-03-26 | End: 2021-06-12

## 2021-03-26 RX ORDER — CARVEDILOL 3.12 MG/1
TABLET ORAL
Qty: 180 TABLET | Refills: 4 | Status: ON HOLD | OUTPATIENT
Start: 2021-03-26 | End: 2021-06-15 | Stop reason: HOSPADM

## 2021-03-26 RX ORDER — BUMETANIDE 2 MG/1
2 TABLET ORAL DAILY
Qty: 30 TABLET | Refills: 3 | Status: SHIPPED | OUTPATIENT
Start: 2021-03-26 | End: 2021-04-13 | Stop reason: SDUPTHER

## 2021-03-26 RX ORDER — FLUCONAZOLE 150 MG/1
TABLET ORAL
Qty: 2 TABLET | Refills: 0 | Status: SHIPPED | OUTPATIENT
Start: 2021-03-26 | End: 2021-04-13 | Stop reason: ALTCHOICE

## 2021-03-26 RX ORDER — IPRATROPIUM BROMIDE AND ALBUTEROL SULFATE 2.5; .5 MG/3ML; MG/3ML
1 SOLUTION RESPIRATORY (INHALATION) EVERY 4 HOURS PRN
Qty: 360 ML | Refills: 1 | Status: SHIPPED | OUTPATIENT
Start: 2021-03-26 | End: 2021-04-13 | Stop reason: SDUPTHER

## 2021-03-26 SDOH — ECONOMIC STABILITY: FOOD INSECURITY: WITHIN THE PAST 12 MONTHS, THE FOOD YOU BOUGHT JUST DIDN'T LAST AND YOU DIDN'T HAVE MONEY TO GET MORE.: NOT ASKED

## 2021-03-26 SDOH — ECONOMIC STABILITY: INCOME INSECURITY: HOW HARD IS IT FOR YOU TO PAY FOR THE VERY BASICS LIKE FOOD, HOUSING, MEDICAL CARE, AND HEATING?: NOT HARD AT ALL

## 2021-03-26 SDOH — ECONOMIC STABILITY: FOOD INSECURITY: WITHIN THE PAST 12 MONTHS, YOU WORRIED THAT YOUR FOOD WOULD RUN OUT BEFORE YOU GOT MONEY TO BUY MORE.: NOT ASKED

## 2021-03-26 ASSESSMENT — LIFESTYLE VARIABLES
HOW OFTEN DO YOU HAVE A DRINK CONTAINING ALCOHOL: 0
AUDIT TOTAL SCORE: INCOMPLETE
AUDIT-C TOTAL SCORE: INCOMPLETE
HOW OFTEN DO YOU HAVE A DRINK CONTAINING ALCOHOL: NEVER

## 2021-03-26 ASSESSMENT — PATIENT HEALTH QUESTIONNAIRE - PHQ9
2. FEELING DOWN, DEPRESSED OR HOPELESS: 0
SUM OF ALL RESPONSES TO PHQ9 QUESTIONS 1 & 2: 0
SUM OF ALL RESPONSES TO PHQ QUESTIONS 1-9: 0
SUM OF ALL RESPONSES TO PHQ QUESTIONS 1-9: 0

## 2021-03-26 NOTE — PROGRESS NOTES
Medicare Annual Wellness Visit  Name: Kavita Fajardo Date: 3/26/2021   MRN: [id number] Sex: Female   Age: 48 y.o. Ethnicity: Non-/Non    : 1965 Race: Satya Jacobs is here for Medicare AWV (Annual Exam )    Screenings for behavioral, psychosocial and functional/safety risks, and cognitive dysfunction are all negative except as indicated below. These results, as well as other patient data from the [address] form, are documented in Flowsheets linked to this Encounter. Allergies   Allergen Reactions    Food Hives     Patient still eats strawberries    Other Rash     grass       Prior to Visit Medications    Medication Sig Taking? Authorizing Provider   losartan (COZAAR) 100 MG tablet TAKE 1/2 TABLET BY MOUTH EVERY DAY Yes Zoie Ervin PA-C   oxybutynin (DITROPAN-XL) 5 MG extended release tablet Take 1 tablet by mouth daily Yes Zioe Ervin PA-C   buPROPion (WELLBUTRIN XL) 150 MG extended release tablet TAKE 1 TABLET BY MOUTH EVERY DAY IN THE MORNING Yes Zoie Ervin PA-C   furosemide (LASIX) 40 MG tablet Take 1 tablet by mouth daily Yes Zoie Ervin PA-C   ergotamine-caffeine (CAFERGOT) 1-100 MG per tablet Two tablets at onset of attack; then 1 tablet every 30 minutes as needed; maximum: 6 tablets per attack; do not exceed 10 tablets/week. Yes Ronel Garcia, APRN - CNP   carvedilol (COREG) 3.125 MG tablet TAKE 1 TABLET BY MOUTH TWICE A DAY Yes Zoie Ervin PA-C   Hospital Bed MISC by Does not apply route Yes Zoie Ervin PA-C   Misc. Devices (BARIATRIC ROLLATOR) MISC To help with ambulation related to CHF/shortness of breath with activity. Yes Zoie Ervin PA-C   Handicap Placard MISC by Does not apply route Good for 5 years. Expires: 10/2/2025. Yes Zoie Ervin PA-C   nystatin (MYCOSTATIN) 426697 UNIT/GM cream Apply topically 2 times daily. Yes Zoie Ervin PA-C   nystatin (MYCOSTATIN) 210018 UNIT/GM powder Apply 3 times daily.  Yes Philip Glynn PA-C vitamin D (ERGOCALCIFEROL) 1.25 MG (37474 UT) CAPS capsule TAKE 1 CAPSULE BY MOUTH ONE TIME PER WEEK Yes Zoie Ervin PA-C   albuterol sulfate  (90 Base) MCG/ACT inhaler Inhale 2 puffs into the lungs 2 times daily Yes Jojo Bean PA-C       Past Medical History:   Diagnosis Date    Anxiety     Asthma     CHF (congestive heart failure) (HCC)     Chronic back pain     [facility]     Depression     Hypertension     Obesity     Pelvic pain in female 2/15/2018    Type 2 diabetes mellitus without complication ([facility] [address] 4/7/2556       Past Surgical History:   Procedure Laterality Date    BLADDER SUSPENSION  02/2014    CARPAL TUNNEL RELEASE  89 or 89    right hand     HYSTERECTOMY  2009    full    RI CMBND ANTERPOST COLPORRAPHY W/CYSTO W/NTRCL RPR N/A 2/19/2018    EXCISION OF VAGINAL MESH, CYSTOSCOPY performed by Pilar Hermosillo DO at [facility]      27 yrs ago       Family History   Problem Relation Age of Onset    Alzheimer's Disease Father     High Blood Pressure Father     Stroke Father         11 strokes in one year    Asthma Sister     Asthma Brother     No Known Problems Daughter     No Known Problems Daughter        CareTeam (Including outside providers/suppliers regularly involved in providing care):   Patient Care Team:  Jojo Bean PA-C as PCP - General (Family Medicine)  Jojo Bean PA-C as PCP - REHABILITATION [facility] Empaneled Provider    Wt Readings from Last 3 Encounters:   03/26/21 (!) 351 lb (159.2 kg)   03/06/20 (!) 343 lb 6.4 oz (155.8 kg)   02/05/20 (!) 346 lb (156.9 kg)     There were no vitals filed for this visit. There is no height or weight on file to calculate BMI. Based upon direct observation of the patient, evaluation of cognition reveals recent and remote memory intact. Patient's complete Health Risk Assessment and screening values have been reviewed and are found in Flowsheets.  The following problems were reviewed today and where indicated follow up appointments were made and/or referrals ordered. Positive Risk Factor Screenings with Interventions:         Substance History:  Social History     Tobacco History     Smoking Status  Current Every Day Smoker Smoking Start Date  5/6/1979 Smoking Frequency  1 pack/day Smoking Tobacco Type  Cigarettes    Smokeless Tobacco Use  Never Used          Alcohol History     Alcohol Use Status  No          Drug Use     Drug Use Status  No          Sexual Activity     Sexually Active  Yes Partners  Male               Alcohol Screening:       A score of 8 or more is associated with harmful or hazardous drinking. A score of 13 or more in women, and 15 or more in men, is likely to indicate alcohol dependence. Substance Abuse Interventions:  · Alcohol misuse/dependence:  patient is not ready to change his/her alcohol consumption behavior at this time    General Health and ACP:  General  In general, how would you say your health is?: (!) Poor  In the past 7 days, have you experienced any of the following?  New or Increased Pain, New or Increased Fatigue, Loneliness, Social Isolation, Stress or Anger?: (!) New or Increased Pain, New or Increased Fatigue, Anger  Do you get the social and emotional support that you need?: Yes  Do you have a Living Will?: (!) No  Advance Directives     Power of  Living Will ACP-Advance Directive ACP-Power of     Not on File Not on File Not on File Not on File      General Health Risk Interventions:  · Poor self-assessment of health status: follow up wiht me in 2 weeks  · Pain issues: related to BLE, treatment beginning today  · Anger: patient declines any further evaluation/treatment for this issue  · No Living Will: Patient declines ACP discussion/assistance    Health Habits/Nutrition:  Health Habits/Nutrition  Do you exercise for at least 20 minutes 2-3 times per week?: (!) No  Have you lost any weight without trying in the past 3 months?: (!) Yes Do you eat only one meal per day?: No  Have you seen the dentist within the past year?: Yes     Health Habits/Nutrition Interventions:  · Inadequate physical activity:  patient is not ready to increase his/her physical activity level at this time    Hearing/Vision:  No exam data present  Hearing/Vision  Do you or your family notice any trouble with your hearing that hasn't been managed with hearing aids?: (!) Yes  Do you have difficulty driving, watching TV, or doing any of your daily activities because of your eyesight?: No  Have you had an eye exam within the past year?: Appointment is scheduled  Hearing/Vision Interventions:  · Hearing concerns:  patient declines any further evaluation/treatment for hearing issues    Safety:  Safety  Do you have working smoke detectors?: Yes  Have all throw rugs been removed or fastened?: (!) No  Do you have non-slip mats or surfaces in all bathtubs/showers?: Yes  Do all of your stairways have a railing or banister?: Yes  Are your doorways, halls and stairs free of clutter?: Yes  Do you always fasten your seatbelt when you are in a car?: Yes  Safety Interventions:  · Patient declines any further evaluation/treatment for this issue     Personalized Preventive Plan   Current Health Maintenance Status    There is no immunization history on file for this patient.      Health Maintenance   Topic Date Due    Hepatitis C screen  Never done    Pneumococcal 0-64 years Vaccine (1 of 1 - PPSV23) Never done    HIV screen  Never done    COVID-19 Vaccine (1) Never done    DTaP/Tdap/Td vaccine (1 - Tdap) Never done    Breast cancer screen  Never done    Shingles Vaccine (1 of 2) Never done    Cervical cancer screen  11/22/2016    Annual Wellness Visit (AWV)  Never done    Flu vaccine (1) Never done    Potassium monitoring  05/26/2021    Creatinine monitoring  05/26/2021    Colon cancer screen fecal DNA test (Cologuard)  02/25/2023    Lipid screen  01/24/2025    Hepatitis A vaccine  Aged Out    Hepatitis B vaccine  Aged Out    Hib vaccine  Aged Out    Meningococcal (ACWY) vaccine  Aged Out     Recommendations for [employer] Due: see orders and patient instructions/AVS.  . Recommended screening schedule for the next 5-10 years is provided to the patient in written form: see Patient Instructions/AVS.    Ann Tilley was seen today for medicare awv. Diagnoses and all orders for this visit:    Routine general medical examination at a health care facility                 [facility] ANAMIKA Ervin PA-C

## 2021-03-26 NOTE — PROGRESS NOTES
Subjective  Cornel Silverio, 48 y.o. female presents today with:  Chief Complaint   Patient presents with    Leg Pain     Bilateral leg pain x 2 weeks leaking fluid      HPI   Last OV with me: 2/23/2021 via VV. Bilateral edema LE. Patient is in the office today c/o worsening leg edema with open wounds and drainage. This is gradually worsening. Treatment to date has included lasix, elevation of LEs, wrapping with ACE-wraps. +pain of LEs. Admits to limited activity given her size and concern for COVID-19. Tobacco abuse. Initially reduced but now smoking 1 PPD. States that stress increases desire to smoke. Obesity/Weight gain.  +gain over the past couple of months. Admits to drinking regular soda. No regular exercise. Asking for rollator order to be reprinted. Has bariatric hospital bed as she has a hard time going up/down stairs. CHF/CAD/HTN. Complaint with medidations--coreg, losartan and diuretic therapy. Denies CP. +SOB/WHITE--related to deconditioning. She has not follow up with cardiology in over a year. COPD.  +sob, white. Denies wheezing/dough with sputum. Using inhalers. Would like nebulizer machine for home use, PRN. Review of Systems   Constitutional: Positive for activity change, fatigue and unexpected weight change. Negative for appetite change, chills, diaphoresis and fever. HENT: Positive for dental problem. Negative for facial swelling. Respiratory: Positive for shortness of breath (improved with oxygen). Negative for cough, chest tightness and wheezing. Cardiovascular: Positive for leg swelling. Negative for chest pain and palpitations. Gastrointestinal: Negative for abdominal distention and abdominal pain. Genitourinary: Negative for difficulty urinating, dysuria, hematuria, pelvic pain, urgency and vaginal pain. Musculoskeletal: Positive for arthralgias, back pain and gait problem. Negative for myalgias.         Chronic   Skin: Negative for color change and rash. Neurological: Positive for weakness and numbness (BLE). Negative for dizziness, light-headedness and headaches. Psychiatric/Behavioral: Positive for sleep disturbance (improving.). Negative for dysphoric mood. The patient is not nervous/anxious. Past Medical History:   Diagnosis Date    Anxiety     Asthma     [facility]     Chronic back pain     COPD (chronic obstructive pulmonary disease) ([facility] [address]     Depression     Hypertension     Obesity     Pelvic pain in female 2/15/2018    Type [facility] mellitus [facility] 1/5/3298     Past Surgical History:   Procedure Laterality Date    BLADDER SUSPENSION  02/2014    CARPAL TUNNEL RELEASE  89 or 90    right hand     HYSTERECTOMY  2009    full    MO CMBND ANTERPOST COLPORRAPHY W/CYSTO W/NTRCL RPR N/A 2/19/2018    EXCISION OF VAGINAL MESH, CYSTOSCOPY performed by E[facility]isto Delgado DO at [facility]      27 yrs ago     Social History     Socioeconomic History    Marital status:       Spouse name: Not on file    Number of children: Not on file    Years of education: Not on file    Highest education level: Not on file   Occupational History    Not on file   Social Needs    Financial resource strain: Not hard at all    Food insecurity     Worry: Not on file     Inability: Not on file    Transportation needs     Medical: Not on file     Non-medical: Not on file   Tobacco Use    Smoking status: Current Every Day Smoker     Packs/day: 1.00     Types: Cigarettes     Start date: 5/6/1979    Smokeless tobacco: Never Used   Substance and Sexual Activity    Alcohol use: No    Drug use: No    Sexual activity: Yes     Partners: Male   Lifestyle    Physical activity     Days per week: Not on file     Minutes per session: Not on file    Stress: Not on file   Relationships    Social connections     Talks on phone: Not on file     Gets together: Not on file Attends Mandaen service: Not on file     Active member of club or organization: Not on file     Attends meetings of clubs or organizations: Not on file     Relationship status: Not on file    Intimate partner violence     Fear of current or ex partner: Not on file     Emotionally abused: Not on file     Physically abused: Not on file     Forced sexual activity: Not on file   Other Topics Concern    Not on file   Social History Narrative    Not on file     Family History   Problem Relation Age of Onset    Alzheimer's Disease Father     High Blood Pressure Father     Stroke Father         11 strokes in one year    Asthma Sister     Asthma Brother     No Known Problems Daughter     No Known Problems Daughter      Allergies   Allergen Reactions    Food Hives     Patient still eats strawberries    Other Rash     grass     Current Outpatient Medications   Medication Sig Dispense Refill    carvedilol (COREG) 3.125 MG tablet TAKE 1 TABLET BY MOUTH TWICE A  tablet 4    nystatin (MYCOSTATIN) 024914 UNIT/GM powder Apply 3 times daily. 60 g 2    bumetanide (BUMEX) 2 MG tablet Take 1 tablet by mouth daily 30 tablet 3    potassium chloride (KLOR-CON M) 20 MEQ extended release tablet Take 1 tablet by mouth daily With water pill (bumex) 30 tablet 5    ipratropium-albuterol (DUONEB) 0.5-2.5 (3) MG/3ML SOLN nebulizer solution Inhale 3 mLs into the lungs every 4 hours as needed for Shortness of Breath 360 mL 1    Handicap Placard MISC by Does not apply route Good for 5 years. Expires 3/27/2026. 1 each 0    Misc. Devices (BARIATRIC ROLLATOR) MISC To help with ambulation related to CHF/shortness of breath with activity. 1 each 0    clindamycin (CLEOCIN) 300 MG capsule Take 1 capsule by mouth 3 times daily for 10 days 30 capsule 0    fluconazole (DIFLUCAN) 150 MG tablet 1 PO STAT; repeat 1 PO in 72 hours.  2 tablet 0    losartan (COZAAR) 100 MG tablet TAKE 1/2 TABLET BY MOUTH EVERY DAY 45 tablet 1    oxybutynin (DITROPAN-XL) 5 MG extended release tablet Take 1 tablet by mouth daily 30 tablet 3    buPROPion (WELLBUTRIN XL) 150 MG extended release tablet TAKE 1 TABLET BY MOUTH EVERY DAY IN THE MORNING 90 tablet 1    ergotamine-caffeine (CAFERGOT) 1-100 MG per tablet Two tablets at onset of attack; then 1 tablet every 30 minutes as needed; maximum: 6 tablets per attack; do not exceed 10 tablets/week. 30 tablet [address] by Does not apply route 1 each 0    nystatin (MYCOSTATIN) 367968 UNIT/GM cream Apply topically 2 times daily. 30 g 2    vitamin D (ERGOCALCIFEROL) 1.25 MG (40898 UT) CAPS capsule TAKE 1 CAPSULE BY MOUTH ONE TIME PER WEEK 12 capsule 1    albuterol sulfate  (90 Base) MCG/ACT inhaler Inhale 2 puffs into the lungs 2 times daily 2 Inhaler 3     No current facility-administered medications for this visit. PMH, Surgical Hx, Family Hx, and Social Hx reviewed and updated. Health Maintenance reviewed. Objective  Vitals:    03/26/21 1306   BP: 132/82   Pulse: 84   Resp: 18   Temp: 97.2 °F (36.2 °C)   TempSrc: Temporal   SpO2: 97%   Weight: (!) 351 lb (159.2 kg)   Height: 5' 5\" (1.651 m)     BP Readings from Last 3 Encounters:   03/26/21 132/82   03/06/20 128/84   02/05/20 124/86     Wt Readings from Last 3 Encounters:   03/26/21 (!) 351 lb (159.2 kg)   03/06/20 (!) 343 lb 6.4 oz (155.8 kg)   02/05/20 (!) 346 lb (156.9 kg)     Physical Exam  Vitals signs reviewed. Constitutional:       General: She is not in acute distress. Appearance: She is obese. She is not ill-appearing, toxic-appearing or diaphoretic. HENT:      Head: Normocephalic and atraumatic. Right Ear: Tympanic membrane, ear canal and external ear normal. There is no impacted cerumen. Left Ear: Tympanic membrane, ear canal and external ear normal. There is no impacted cerumen. Mouth/Throat:      Mouth: Mucous membranes are moist.      Pharynx: Oropharynx is clear.    Eyes: Conjunctiva/sclera: Conjunctivae normal.   Cardiovascular:      Rate and Rhythm: Normal rate and regular rhythm. Heart sounds: Normal heart sounds. No murmur. Pulmonary:      Effort: Pulmonary effort is normal. No respiratory distress. Breath sounds: Normal breath sounds. No stridor. No wheezing or rhonchi. Abdominal:      Comments: Obese abdomen   Musculoskeletal:         General: Tenderness present. Right lower leg: Edema present. Left lower leg: Edema present. Skin:     General: Skin is warm. Coloration: Skin is not jaundiced or pale. Findings: Erythema present. No bruising or rash. Comments: See clinical picture   Neurological:      General: No focal deficit present. Mental Status: She is alert and oriented to person, place, and time. Psychiatric:         Attention and Perception: Attention normal.         Mood and Affect: Mood and affect normal.         Speech: Speech normal.         Behavior: Behavior normal.         Cognition and Memory: Cognition normal.         Judgment: Judgment normal.      Comments:       Media Information       Document Information    Wound   Bilateral cellulitis with open wound    03/26/2021 13:25   Attached To: Office Visit on 3/26/21 with Philip Glynn PA-C   Source Information    Philip Glynn PA-C  [facility] was seen today for leg pain. Diagnoses and all orders for this visit:    BMI 50.0-59.9, adult ([facility] [address]  -     CBC Auto Differential; Future  -     Comprehensive Metabolic Panel; Future  -     Brain Natriuretic Peptide; Future  -     Lipid Panel; Future  -     Insulin Free & Total; Future  -     Vitamin D 25 Hydroxy; Future  -     Hemoglobin A1C; Future    Diastolic dysfunction  -     CBC Auto Differential; Future  -     Comprehensive Metabolic Panel; Future  -     Brain Natriuretic Peptide; Future  -     Louis Lou MD, Cardiology, [facility].  Devices (BARIATRIC ROLLATOR) MISC; To help with ambulation related to CHF/shortness of breath with activity. Essential hypertension  -     CBC Auto Differential; Future  -     Comprehensive Metabolic Panel; Future  -     Brain Natriuretic Peptide; Future  -     carvedilol (COREG) 3.125 MG tablet; TAKE 1 TABLET BY MOUTH TWICE A Cyrus Cleveland MD, Cardiology[facility]    Coronary artery disease involving native coronary artery of native heart, angina presence unspecified  -     CBC Auto Differential; Future  -     Comprehensive Metabolic Panel; Future  -     Brain Natriuretic Peptide; Future  -     Lipid Panel; Future  -     carvedilol (COREG) 3.125 MG tablet; TAKE 1 TABLET BY MOUTH TWICE Cyrus Cleveland MD, Cardiology, [place]    Vitamin D deficiency  -     Vitamin D 25 Hydroxy; Future    Weight gain  -     Brain Natriuretic Peptide; Future    Edema of both legs  -     Brain Natriuretic Peptide; Future  -     bumetanide (BUMEX) 2 MG tablet; Take 1 tablet by mouth daily  -     potassium chloride (KLOR-CON M) 20 MEQ extended release tablet; Take 1 tablet by mouth daily With water pill (bumex)  -     Emily Tejada NP, Interventional Radiology, [place]    Dysmetabolic syndrome  -     Lipid Panel; Future  -     Insulin Free & Total; Future  -     Vitamin D 25 Hydroxy; Future  -     Hemoglobin A1C; Future    Hypertensive heart disease with heart failure (HCC)   -     Brain Natriuretic Peptide; Future  -     Handicap Placard MISC; by Does not apply route Good for 5 years. Expires 3/27/2026.  -     Misc. Devices (BARIATRIC ROLLATOR) MISC; To help with ambulation related to CHF/shortness of breath with activity. Prediabetes   -     Hemoglobin A1C; Future    Intertrigo  -     nystatin (MYCOSTATIN) 279239 UNIT/GM powder; Apply 3 times daily. -     fluconazole (DIFLUCAN) 150 MG tablet; 1 PO STAT; repeat 1 PO in 72 hours. Cellulitis of both lower extremities  -     clindamycin (CLEOCIN) 300 MG capsule;  Take 1 capsule by mouth 3 times daily for 10 days  -     fluconazole (DIFLUCAN) 150 MG tablet; 1 PO STAT; repeat 1 PO in 72 hours. Chronic obstructive pulmonary disease, unspecified COPD type ([facility] [address]  -     CBC Auto Differential; Future  -     Comprehensive Metabolic Panel; Future  -     ipratropium-albuterol (DUONEB) 0.5-2.5 (3) MG/3ML SOLN nebulizer solution; Inhale 3 mLs into the lungs every 4 hours as needed for Shortness of Breath    Long talk with patient today. Needs further work-up with IR, follow up with cardiology. Start oral abx therapy, bumex, K+ supplementation. Overdue for labs. 2 week follow up with me in the office.      Orders Placed This Encounter   Procedures    CBC Auto Differential     Standing Status:   Future     Number of Occurrences:   1     Standing Expiration Date:   3/26/2022    Comprehensive Metabolic Panel     Standing Status:   Future     Number of Occurrences:   1     Standing Expiration Date:   3/26/2022    Brain Natriuretic Peptide     Standing Status:   Future     Number of Occurrences:   1     Standing Expiration Date:   3/26/2022    Lipid Panel     Standing Status:   Future     Number of Occurrences:   1     Standing Expiration Date:   3/26/2022     Order Specific Question:   Is Patient Fasting?/# of Hours     Answer:   8+ hours    Insulin Free & Total     Standing Status:   Future     Number of Occurrences:   1     Standing Expiration Date:   3/26/2022    Vitamin D 25 Hydroxy     Standing Status:   Future     Number of Occurrences:   1     Standing Expiration Date:   3/26/2022    Hemoglobin A1C     Standing Status:   Future     Number of Occurrences:   1     Standing Expiration Date:   3/26/2022   Darryle Hoop, MD, Cardiology, [place]     Referral Priority:   Routine     Referral Type:   Eval and Treat     Referral Reason:   Specialty Services Required     Referred to Provider:   Karolina Weir MD     Requested Specialty:   Cardiology     Number of Visits Requested:   Evelia Nichols NP, Interventional Radiology, [place]     Referral Priority:   Routine     Referral Type:   Eval and Treat     Referral Reason:   Specialty Services Required     Referred to Provider:   YOLANDA Morocho CNP     Requested Specialty:   Nurse Practitioner     Number of Visits Requested:   1     Orders Placed This Encounter   Medications    carvedilol (COREG) 3.125 MG tablet     Sig: TAKE 1 TABLET BY MOUTH TWICE A DAY     Dispense:  180 tablet     Refill:  4     DX Code Needed  .  nystatin (MYCOSTATIN) 515028 UNIT/GM powder     Sig: Apply 3 times daily. Dispense:  60 g     Refill:  2    bumetanide (BUMEX) 2 MG tablet     Sig: Take 1 tablet by mouth daily     Dispense:  30 tablet     Refill:  3    potassium chloride (KLOR-CON M) 20 MEQ extended release tablet     Sig: Take 1 tablet by mouth daily With water pill (bumex)     Dispense:  30 tablet     Refill:  5    ipratropium-albuterol (DUONEB) 0.5-2.5 (3) MG/3ML SOLN nebulizer solution     Sig: Inhale 3 mLs into the lungs every 4 hours as needed for Shortness of Breath     Dispense:  360 mL     Refill:  1    Handicap Placard MISC     Sig: by Does not apply route Good for 5 years. Expires 3/27/2026. Dispense:  1 each     Refill:  0    Misc. Devices (BARIATRIC ROLLATOR) MISC     Sig: To help with ambulation related to CHF/shortness of breath with activity. Dispense:  1 each     Refill:  0    clindamycin (CLEOCIN) 300 MG capsule     Sig: Take 1 capsule by mouth 3 times daily for 10 days     Dispense:  30 capsule     Refill:  0    fluconazole (DIFLUCAN) 150 MG tablet     Si PO STAT; repeat 1 PO in 72 hours. Dispense:  2 tablet     Refill:  0     Medications Discontinued During This Encounter   Medication Reason    furosemide (LASIX) 40 MG tablet Alternate therapy    Handicap Placard MISC Therapy completed    nystatin (MYCOSTATIN) 790701 UNIT/GM powder REORDER    Misc.  Devices (BARIATRIC ROLLATOR) MISC REORDER    carvedilol (COREG) 3.125 MG tablet REORDER     Return in about 2 weeks (around 4/9/2021) for follow up in office. Reviewed with the patient: current clinical status, medications, activities and diet. Side effects, adverse effects of the medication prescribed today, as well as treatment plan/ rationale and result expectations have been discussed with the patient who expresses understanding and desires to proceed. Close follow up to evaluate treatment results and for coordination of care. I have reviewed the patient's medical history in detail and updated the computerized patient record.     Zoie Ervin PA-C

## 2021-03-26 NOTE — PATIENT INSTRUCTIONS
Personalized Preventive Plan for [facility] - 3/26/2021  Medicare offers a range of preventive health benefits. Some of the tests and screenings are paid in full while other may be subject to a deductible, co-insurance, and/or copay. Some of these benefits include a comprehensive review of your medical history including lifestyle, illnesses that may run in your family, and various assessments and screenings as appropriate. After reviewing your medical record and screening and assessments performed today your provider may have ordered immunizations, labs, imaging, and/or referrals for you. A list of these orders (if applicable) as well as your Preventive Care list are included within your After Visit Summary for your review. Other Preventive Recommendations:    · A preventive eye exam performed by an eye specialist is recommended every 1-2 years to screen for glaucoma; cataracts, macular degeneration, and other eye disorders. · A preventive dental visit is recommended every 6 months. · Try to get at least 150 minutes of exercise per week or 10,000 steps per day on a pedometer . · Order or download the FREE \"Exercise & Physical Activity: Your Everyday Guide\" from [employer]. Call 6-458.871.7236 or search [employer] online. · You need 8379-6127 mg of calcium and 9266-7776 IU of vitamin D per day. It is possible to meet your calcium requirement with diet alone, but a vitamin D supplement is usually necessary to meet this goal.  · When exposed to the sun, use a sunscreen that protects against both UVA and UVB radiation with an SPF of 30 or greater. Reapply every 2 to 3 hours or after sweating, drying off with a towel, or swimming. · Always wear a seat belt when traveling in a car. Always wear a helmet when riding a bicycle or motorcycle.

## 2021-03-27 PROBLEM — L03.116 CELLULITIS OF BOTH LOWER EXTREMITIES: Status: ACTIVE | Noted: 2021-03-27

## 2021-03-27 PROBLEM — E88.81 DYSMETABOLIC SYNDROME: Status: ACTIVE | Noted: 2021-03-27

## 2021-03-27 PROBLEM — E88.810 DYSMETABOLIC SYNDROME: Status: ACTIVE | Noted: 2021-03-27

## 2021-03-27 PROBLEM — R73.03 PREDIABETES: Status: ACTIVE | Noted: 2021-03-27

## 2021-03-27 PROBLEM — L03.115 CELLULITIS OF BOTH LOWER EXTREMITIES: Status: ACTIVE | Noted: 2021-03-27

## 2021-03-27 ASSESSMENT — ENCOUNTER SYMPTOMS
ABDOMINAL PAIN: 0
CHEST TIGHTNESS: 0
BACK PAIN: 1
FACIAL SWELLING: 0
COLOR CHANGE: 0
WHEEZING: 0
COUGH: 0
ABDOMINAL DISTENTION: 0
SHORTNESS OF BREATH: 1

## 2021-03-29 LAB
INSULIN FREE: 11 UIU/ML (ref 3–19)
INSULIN: 13 UIU/ML (ref 3–19)

## 2021-04-01 DIAGNOSIS — I25.10 CORONARY ARTERY DISEASE INVOLVING NATIVE CORONARY ARTERY OF NATIVE HEART, ANGINA PRESENCE UNSPECIFIED: ICD-10-CM

## 2021-04-01 DIAGNOSIS — E88.81 DYSMETABOLIC SYNDROME: ICD-10-CM

## 2021-04-01 DIAGNOSIS — E55.9 VITAMIN D DEFICIENCY: ICD-10-CM

## 2021-04-01 RX ORDER — ATORVASTATIN CALCIUM 10 MG/1
10 TABLET, FILM COATED ORAL NIGHTLY
Qty: 90 TABLET | Refills: 4 | Status: SHIPPED | OUTPATIENT
Start: 2021-04-01 | End: 2022-02-15 | Stop reason: SDUPTHER

## 2021-04-01 RX ORDER — ERGOCALCIFEROL 1.25 MG/1
50000 CAPSULE ORAL
Qty: 24 CAPSULE | Refills: 1 | Status: SHIPPED | OUTPATIENT
Start: 2021-04-01 | End: 2022-02-15

## 2021-04-13 ENCOUNTER — OFFICE VISIT (OUTPATIENT)
Dept: FAMILY MEDICINE CLINIC | Age: 56
End: 2021-04-13
Payer: MEDICARE

## 2021-04-13 VITALS
RESPIRATION RATE: 17 BRPM | WEIGHT: 293 LBS | DIASTOLIC BLOOD PRESSURE: 84 MMHG | SYSTOLIC BLOOD PRESSURE: 134 MMHG | BODY MASS INDEX: 58.74 KG/M2 | TEMPERATURE: 97.5 F | HEART RATE: 93 BPM | OXYGEN SATURATION: 80 %

## 2021-04-13 DIAGNOSIS — R60.0 BILATERAL LEG EDEMA: ICD-10-CM

## 2021-04-13 DIAGNOSIS — L03.116 CELLULITIS OF BOTH LOWER EXTREMITIES: ICD-10-CM

## 2021-04-13 DIAGNOSIS — J44.9 CHRONIC OBSTRUCTIVE PULMONARY DISEASE, UNSPECIFIED COPD TYPE (HCC): Primary | ICD-10-CM

## 2021-04-13 DIAGNOSIS — R06.02 SOB (SHORTNESS OF BREATH): ICD-10-CM

## 2021-04-13 DIAGNOSIS — R60.0 EDEMA OF BOTH LEGS: ICD-10-CM

## 2021-04-13 DIAGNOSIS — R09.02 HYPOXIA: ICD-10-CM

## 2021-04-13 DIAGNOSIS — I11.0 HYPERTENSIVE HEART DISEASE WITH HEART FAILURE (HCC): ICD-10-CM

## 2021-04-13 DIAGNOSIS — L03.115 CELLULITIS OF BOTH LOWER EXTREMITIES: ICD-10-CM

## 2021-04-13 DIAGNOSIS — I51.7 LVH (LEFT VENTRICULAR HYPERTROPHY): ICD-10-CM

## 2021-04-13 DIAGNOSIS — L30.4 INTERTRIGO: ICD-10-CM

## 2021-04-13 DIAGNOSIS — I51.89 DIASTOLIC DYSFUNCTION: ICD-10-CM

## 2021-04-13 DIAGNOSIS — G47.33 OSA (OBSTRUCTIVE SLEEP APNEA): ICD-10-CM

## 2021-04-13 PROCEDURE — 99215 OFFICE O/P EST HI 40 MIN: CPT | Performed by: PHYSICIAN ASSISTANT

## 2021-04-13 RX ORDER — BUMETANIDE 2 MG/1
2 TABLET ORAL 2 TIMES DAILY
Qty: 60 TABLET | Refills: 3 | Status: SHIPPED | OUTPATIENT
Start: 2021-04-13 | End: 2021-04-29 | Stop reason: SDUPTHER

## 2021-04-13 RX ORDER — IPRATROPIUM BROMIDE AND ALBUTEROL SULFATE 2.5; .5 MG/3ML; MG/3ML
1 SOLUTION RESPIRATORY (INHALATION) EVERY 4 HOURS PRN
Qty: 360 ML | Refills: 1 | Status: SHIPPED | OUTPATIENT
Start: 2021-04-13 | End: 2021-06-21 | Stop reason: SDUPTHER

## 2021-04-13 RX ORDER — POTASSIUM CHLORIDE 750 MG/1
20 TABLET, EXTENDED RELEASE ORAL DAILY
Qty: 60 TABLET | Refills: 1 | Status: ON HOLD | OUTPATIENT
Start: 2021-04-13 | End: 2021-06-15 | Stop reason: SDUPTHER

## 2021-04-13 NOTE — PROGRESS NOTES
Subjective  Kandis Patricia, 48 y.o. female presents today with:  Chief Complaint   Patient presents with    Hypertension    Other     2 week follow up      HPI  Gabi Finley is in the office today for 2 week follow up. Last OV with me: 3/26/2021. Bilateral edema LE. Was c/o worsening leg edema with open wounds and drainage at last. Started on bumex therapy with oral abx. Referral to IR for further evaluation--she has upcoming visit on 4/19. Erythema has improved, legs are no longer draining, wounds also improving. Treatment to date has included lasix, elevation of LEs, wrapping with ACE-wraps. +pain of LEs.    Tobacco abuse. Initially reduced but now smoking 1 PPD. States that stress increases desire to smoke. No change.      Obesity/Weight gain.  +gain over the past couple of months. Admits to drinking regular soda. No regular exercise. Asking for rollator order to be reprinted. Has bariatric hospital bed as she has a hard time going up/down stairs. Did not receive rollator yet to help with ambulation. Patient tires easily given weight/size.      CHF/CAD/HTN. Complaint with medidations--coreg, losartan and diuretic therapy. Denies CP. +SOB/WHITE--related to physical deconditioning. Upcoming visit with Dr. Bryon Olivarez on 4/26/21. Last echo 6/2020--EF 55-60%, +L ventrical diastolic filling, moderately enlarge R ventricle. COPD.  +sob, white. No significant change from last OV with me. Remains on oxygen at home, needs portable tank when she is out of the house. Denies wheezing/cough with sputum. Using inhalers. Was given nebulizer at last OV, did not  duoneb.      Lab results:   Results for orders placed or performed in visit on 03/26/21   Brain Natriuretic Peptide   Result Value Ref Range    Pro-BNP 94 pg/mL   Hemoglobin A1C   Result Value Ref Range    Hemoglobin A1C 5.1 4.8 - 5.9 %   Vitamin D 25 Hydroxy   Result Value Ref Range    Vit D, 25-Hydroxy 13.8 (L) 30.0 - Positive for leg swelling. Negative for chest pain and palpitations. Gastrointestinal: Negative for abdominal distention and abdominal pain. Genitourinary: Negative for difficulty urinating, dysuria, hematuria, pelvic pain, urgency and vaginal pain. Musculoskeletal: Positive for arthralgias, back pain and gait problem. Negative for myalgias. Chronic   Skin: Negative for color change and rash. Neurological: Positive for weakness and numbness (BLE). Negative for dizziness, light-headedness and headaches. Psychiatric/Behavioral: Positive for sleep disturbance (improving.). Negative for dysphoric mood. The patient is not nervous/anxious. Past Medical History:   Diagnosis Date    Anxiety     Asthma     CH[facility]congestive [facility] failure) [facility]     Chronic back pain     COPD (chronic obstructive pulmonary disease) ([facility] [address]     Depression     Hypertension     Obesity     Pelvic pain in female 2/15/2018    Type 2 diabetes mellitus without complication ([facility] [place] [address] 1/9/6532     Past Surgical History:   Procedure Laterality Date    BLADDER SUSPENSION  02/2014    CARPAL TUNNEL RELEASE  89 or 90    right hand     HYSTERECTOMY  2009    full    [place] CMBND ANTERPOST COLPORRAPHY W/CYSTO W/NTRCL RPR N/A 2/19/2018    EXCISION OF VAGINAL MESH, CYSTOSCOPY performed by Anushka Velazquez DO at [address] [place] [address]      27 yrs ago     Social History     Socioeconomic History    Marital status:       Spouse name: Not on file    Number of children: Not on file    Years of education: Not on file    Highest education level: Not on file   Occupational History    Not on file   Social Needs    Financial resource strain: Not hard at all    Food insecurity     Worry: Not on file     Inability: Not on file    Transportation needs     Medical: Not on file     Non-medical: Not on file   Tobacco Use    Smoking status: Current Every Day Smoker     Packs/day: 1.00     Types: Cigarettes     Start date: 5/6/1979   Linda Smokeless tobacco: Never Used   Substance and Sexual Activity    Alcohol use: No    Drug use: No    Sexual activity: Yes     Partners: Male   Lifestyle    Physical activity     Days per week: Not on file     Minutes per session: Not on file    Stress: Not on file   Relationships    Social connections     Talks on phone: Not on file     Gets together: Not on file     Attends Yazidism service: Not on file     Active member of club or organization: Not on file     Attends meetings of clubs or organizations: Not on file     Relationship status: Not on file    Intimate partner violence     Fear of current or ex partner: Not on file     Emotionally abused: Not on file     Physically abused: Not on file     Forced sexual activity: Not on file   Other Topics Concern    Not on file   Social History Narrative    Not on file     Family History   Problem Relation Age of Onset    Alzheimer's Disease Father     High Blood Pressure Father     Stroke Father         11 strokes in one year    Asthma Sister     Asthma Brother     No Known Problems Daughter     No Known Problems Daughter      Allergies   Allergen Reactions    Food Hives     Patient still eats strawberries    Other Rash     grass     Current Outpatient Medications   Medication Sig Dispense Refill    ipratropium-albuterol (DUONEB) 0.5-2.5 (3) MG/3ML SOLN nebulizer solution Inhale 3 mLs into the lungs every 4 hours as needed for Shortness of Breath 360 mL 1    Misc. Devices (BARIATRIC ROLLATOR) MISC To help with ambulation related to CHF/shortness of breath with activity. 1 each 0    silver sulfADIAZINE (SILVADENE) 1 % cream Apply topically daily. 400 g 2    bumetanide (BUMEX) 2 MG tablet Take 1 tablet by mouth 2 times daily Take with K+ supplement.  60 tablet 3    potassium chloride (KLOR-CON M) 10 MEQ extended release tablet Take 2 tablets by mouth daily With water pill (bumex) 60 tablet 1    atorvastatin (LIPITOR) 10 MG tablet Take 1 tablet by mouth nightly 90 tablet 4    vitamin D (ERGOCALCIFEROL) 1.25 MG (18140 UT) CAPS capsule Take 1 capsule by mouth Twice a Week 24 capsule 1    carvedilol (COREG) 3.125 MG tablet TAKE 1 TABLET BY MOUTH TWICE A  tablet 4    nystatin (MYCOSTATIN) 625242 UNIT/GM powder Apply 3 times daily. 60 g 2    Handicap Placard MISC by Does not apply route Good for 5 years. Expires 3/27/2026. 1 each 0    losartan (COZAAR) 100 MG tablet TAKE 1/2 TABLET BY MOUTH EVERY DAY 45 tablet 1    oxybutynin (DITROPAN-XL) 5 MG extended release tablet Take 1 tablet by mouth daily 30 tablet 3    buPROPion (WELLBUTRIN XL) 150 MG extended release tablet TAKE 1 TABLET BY MOUTH EVERY DAY IN THE MORNING 90 tablet 1    ergotamine-caffeine (CAFERGOT) 1-100 MG per tablet Two tablets at onset of attack; then 1 tablet every 30 minutes as needed; maximum: 6 tablets per attack; do not exceed 10 tablets/week. 30 tablet [address] by Does not apply route 1 each 0    nystatin (MYCOSTATIN) 622645 UNIT/GM cream Apply topically 2 times daily. 30 g 2    albuterol sulfate  (90 Base) MCG/ACT inhaler Inhale 2 puffs into the lungs 2 times daily 2 Inhaler 3     No current facility-administered medications for this visit. PMH, Surgical Hx, Family Hx, and Social Hx reviewed and updated. Health Maintenance reviewed. Objective  Vitals:    04/13/21 1405   BP: 134/84   Pulse: 93   Resp: 17   Temp: 97.5 °F (36.4 °C)   TempSrc: Temporal   SpO2: (!) 80%   Weight: (!) 353 lb (160.1 kg)     BP Readings from Last 3 Encounters:   04/13/21 134/84   03/26/21 132/82   03/06/20 128/84     Wt Readings from Last 3 Encounters:   04/13/21 (!) 353 lb (160.1 kg)   03/26/21 (!) 351 lb (159.2 kg)   03/06/20 (!) 343 lb 6.4 oz (155.8 kg)     Physical Exam  Vitals signs reviewed. Constitutional:       General: She is not in acute distress. Appearance: She is obese. She is not ill-appearing, toxic-appearing or diaphoretic.    HENT:      Head: Normocephalic and atraumatic. Right Ear: Tympanic membrane, ear canal and external ear normal. There is no impacted cerumen. Left Ear: Tympanic membrane, ear canal and external ear normal. There is no impacted cerumen. Mouth/Throat:      Mouth: Mucous membranes are moist.      Pharynx: Oropharynx is clear. Eyes:      Conjunctiva/sclera: Conjunctivae normal.   Cardiovascular:      Rate and Rhythm: Normal rate and regular rhythm. Heart sounds: Normal heart sounds. No murmur. Pulmonary:      Effort: Pulmonary effort is normal. No respiratory distress. Breath sounds: Normal breath sounds. No stridor. No wheezing or rhonchi. Abdominal:      Comments: Obese abdomen   Musculoskeletal:         General: Tenderness present. Right lower leg: Edema present. Left lower leg: Edema present. Skin:     General: Skin is warm. Coloration: Skin is not jaundiced or pale. Findings: Erythema present. No bruising or rash. Neurological:      General: No focal deficit present. Mental Status: She is alert and oriented to person, place, and time. Psychiatric:         Attention and Perception: Attention normal.         Mood and Affect: Mood and affect normal.         Speech: Speech normal.         Behavior: Behavior normal.         Cognition and Memory: Cognition normal.         Judgment: Judgment normal.      Comments:         Assessment & Plan   Luba Caputo was seen today for hypertension and other. Diagnoses and all orders for this visit:    Chronic [facility], [facility]  -     Wali Driver MD, Pulmonology, [place]  -     ipratropium-albuterol (DUONEB) 0.5-2.5 (3) MG/3ML SOLN nebulizer solution; Inhale 3 mLs into the lungs every 4 hours as needed for Shortness of Breath    KARISSA (obstructive sleep apnea)  -     Wali Driver MD, Pulmonology, [place]    Bilateral leg edema  -     ECHO Complete 2D W Doppler W Color;  Future    SOB (shortness of breath)  -     ECHO Complete 2D W Doppler W Color; Future  -     ipratropium-albuterol (DUONEB) 0.5-2.5 (3) MG/3ML SOLN nebulizer solution; Inhale 3 mLs into the lungs every 4 hours as needed for Shortness of Breath    Diastolic dysfunction  -     ECHO Complete 2D W Doppler W Color; Future  -     Misc. Devices (BARIATRIC ROLLATOR) MISC; To help with ambulation related to CHF/shortness of breath with activity. LVH (left ventricular hypertrophy)  -     ECHO Complete 2D W Doppler W Color; Future    Intertrigo    Hypertensive heart disease with heart failure (HCC)   -     ECHO Complete 2D W Doppler W Color; Future  -     Misc. Devices (BARIATRIC ROLLATOR) MISC; To help with ambulation related to CHF/shortness of breath with activity. Hypoxia  -     ECHO Complete 2D W Doppler W Color; Future    Cellulitis of both lower extremities  -     silver sulfADIAZINE (SILVADENE) 1 % cream; Apply topically daily. Edema of both legs  -     bumetanide (BUMEX) 2 MG tablet; Take 1 tablet by mouth 2 times daily Take with K+ supplement. -     potassium chloride (KLOR-CON M) 10 MEQ extended release tablet; Take 2 tablets by mouth daily With water pill (bumex)    Upcoming visits with specialists. Needs to be seen by pulmonology. Repeat ECHO. 4 week follow up with me, for close monitoring.      Orders Placed This Encounter   Lavern Huynh MD, Pulmonology, [facility]     Referral Priority:   Routine     Referral Type:   Eval and Treat     Referral Reason:   Specialty Services Required     Referred to Provider:   Jill Shah MD     Requested Specialty:   Pulmonology     Number of Visits Requested:   1    ECHO Complete 2D W Doppler W Color     Standing Status:   Future     Standing Expiration Date:   4/13/2022     Order Specific Question:   Reason for exam:     Answer:   SOB, edema of LEs     Orders Placed This Encounter   Medications    ipratropium-albuterol (DUONEB) 0.5-2.5 (3) MG/3ML SOLN nebulizer solution     Sig: Inhale 3 mLs into the lungs every 4 hours as needed for Shortness of Breath     Dispense:  360 mL     Refill:  1    Misc. Devices (BARIATRIC ROLLATOR) MISC     Sig: To help with ambulation related to CHF/shortness of breath with activity. Dispense:  1 each     Refill:  0    silver sulfADIAZINE (SILVADENE) 1 % cream     Sig: Apply topically daily. Dispense:  400 g     Refill:  2    bumetanide (BUMEX) 2 MG tablet     Sig: Take 1 tablet by mouth 2 times daily Take with K+ supplement. Dispense:  60 tablet     Refill:  3    potassium chloride (KLOR-CON M) 10 MEQ extended release tablet     Sig: Take 2 tablets by mouth daily With water pill (bumex)     Dispense:  60 tablet     Refill:  1     Medications Discontinued During This Encounter   Medication Reason    vitamin D (ERGOCALCIFEROL) 1.25 MG (05349 UT) CAPS capsule DUPLICATE    fluconazole (DIFLUCAN) 150 MG tablet Therapy completed    ipratropium-albuterol (DUONEB) 0.5-2.5 (3) MG/3ML SOLN nebulizer solution REORDER    Misc. Devices (BARIATRIC ROLLATOR) MISC REORDER    bumetanide (BUMEX) 2 MG tablet REORDER    potassium chloride (KLOR-CON M) 20 MEQ extended release tablet REORDER     No follow-ups on file. Reviewed with the patient: current clinical status, medications, activities and diet. Side effects, adverse effects of the medication prescribed today, as well as treatment plan/ rationale and result expectations have been discussed with the patient who expresses understanding and desires to proceed. Close follow up to evaluate treatment results and for coordination of care. I have reviewed the patient's medical history in detail and updated the computerized patient record.     Zoie Ervin PA-C

## 2021-04-14 ASSESSMENT — ENCOUNTER SYMPTOMS
WHEEZING: 0
ABDOMINAL DISTENTION: 0
CHEST TIGHTNESS: 0
BACK PAIN: 1
COLOR CHANGE: 0
FACIAL SWELLING: 0
SHORTNESS OF BREATH: 1
ABDOMINAL PAIN: 0
COUGH: 0

## 2021-04-29 DIAGNOSIS — R60.0 EDEMA OF BOTH LEGS: ICD-10-CM

## 2021-04-29 RX ORDER — BUMETANIDE 2 MG/1
2 TABLET ORAL 2 TIMES DAILY
Qty: 180 TABLET | Refills: 4 | Status: ON HOLD | OUTPATIENT
Start: 2021-04-29 | End: 2021-06-15 | Stop reason: HOSPADM

## 2021-04-29 NOTE — TELEPHONE ENCOUNTER
Rx request   Requested Prescriptions     Pending Prescriptions Disp Refills    bumetanide (BUMEX) 2 MG tablet 180 tablet 4     Sig: Take 1 tablet by mouth 2 times daily Take with K+ supplement.      LOV 4/13/2021  Next Visit Date:  Future Appointments   Date Time Provider Pino Mcintosh   5/13/2021  2:00 PM CYN Echeverria [facility]   6/8/2021  3:00 PM Osvaldo Walker MD [facility]

## 2021-05-12 ENCOUNTER — TELEPHONE (OUTPATIENT)
Dept: FAMILY MEDICINE CLINIC | Age: 56
End: 2021-05-12

## 2021-06-10 ENCOUNTER — TELEPHONE (OUTPATIENT)
Dept: FAMILY MEDICINE CLINIC | Age: 56
End: 2021-06-10

## 2021-06-10 NOTE — TELEPHONE ENCOUNTER
Patient states that she doesn't have a  & that her portable oxygen tank doesn't work, therefore, she gets short of breathe when she comes into the office. She will be in sometime tomorrow into the [facility] care clinic.      AMM,MA

## 2021-06-10 NOTE — TELEPHONE ENCOUNTER
Patient is calling to let you know that she's been feeling shaky, has swollen legs, unable to walk around and has fallen. Would like to be put in the hospital for few days if possible. Would like a phone call from you if possible. Please advise and thanks!

## 2021-06-11 ENCOUNTER — APPOINTMENT (OUTPATIENT)
Dept: GENERAL RADIOLOGY | Age: 56
DRG: 286 | End: 2021-06-11
Payer: MEDICARE

## 2021-06-11 ENCOUNTER — HOSPITAL ENCOUNTER (INPATIENT)
Age: 56
LOS: 4 days | Discharge: HOME | DRG: 286 | End: 2021-06-15
Attending: EMERGENCY MEDICINE | Admitting: INTERNAL MEDICINE
Payer: MEDICARE

## 2021-06-11 ENCOUNTER — APPOINTMENT (OUTPATIENT)
Dept: CT IMAGING | Age: 56
DRG: 286 | End: 2021-06-11
Payer: MEDICARE

## 2021-06-11 ENCOUNTER — OFFICE VISIT (OUTPATIENT)
Dept: FAMILY MEDICINE CLINIC | Age: 56
End: 2021-06-11
Payer: MEDICAID

## 2021-06-11 VITALS — HEART RATE: 98 BPM | OXYGEN SATURATION: 72 %

## 2021-06-11 DIAGNOSIS — R09.02 HYPOXIA: Primary | ICD-10-CM

## 2021-06-11 DIAGNOSIS — R40.0 SOMNOLENCE: ICD-10-CM

## 2021-06-11 DIAGNOSIS — J96.01 ACUTE RESPIRATORY FAILURE WITH HYPOXIA AND HYPERCAPNIA (HCC): Primary | ICD-10-CM

## 2021-06-11 DIAGNOSIS — R79.81 LOW OXYGEN SATURATION: ICD-10-CM

## 2021-06-11 DIAGNOSIS — I50.33 ACUTE ON CHRONIC DIASTOLIC HEART FAILURE (HCC): ICD-10-CM

## 2021-06-11 DIAGNOSIS — J96.02 ACUTE RESPIRATORY FAILURE WITH HYPOXIA AND HYPERCAPNIA (HCC): Primary | ICD-10-CM

## 2021-06-11 DIAGNOSIS — I50.30 DIASTOLIC CONGESTIVE HEART FAILURE, UNSPECIFIED HF CHRONICITY (HCC): ICD-10-CM

## 2021-06-11 DIAGNOSIS — R60.0 EDEMA OF BOTH LEGS: ICD-10-CM

## 2021-06-11 LAB
ALBUMIN SERPL-MCNC: 3.8 G/DL (ref 3.5–4.6)
ALP BLD-CCNC: 95 U/L (ref 40–130)
ALT SERPL-CCNC: 13 U/L (ref 0–33)
ANION GAP SERPL CALCULATED.3IONS-SCNC: 3 MEQ/L (ref 9–15)
AST SERPL-CCNC: 14 U/L (ref 0–35)
BASE EXCESS ARTERIAL: 16 (ref -3–3)
BASOPHILS ABSOLUTE: 0 K/UL (ref 0–0.2)
BASOPHILS RELATIVE PERCENT: 0.5 %
BILIRUB SERPL-MCNC: 0.6 MG/DL (ref 0.2–0.7)
BUN BLDV-MCNC: 10 MG/DL (ref 6–20)
CALCIUM IONIZED: 1.22 MMOL/L (ref 1.12–1.32)
CALCIUM SERPL-MCNC: 9.4 MG/DL (ref 8.5–9.9)
CHLORIDE BLD-SCNC: 94 MEQ/L (ref 95–107)
CO2: 41 MEQ/L (ref 20–31)
CREAT SERPL-MCNC: 0.92 MG/DL (ref 0.5–0.9)
D DIMER: 0.69 MG/L FEU (ref 0–0.5)
EOSINOPHILS ABSOLUTE: 0 K/UL (ref 0–0.7)
EOSINOPHILS RELATIVE PERCENT: 0.5 %
GFR AFRICAN AMERICAN: >60
GFR AFRICAN AMERICAN: >60
GFR NON-AFRICAN AMERICAN: 58
GFR NON-AFRICAN AMERICAN: >60
GLOBULIN: 3.3 G/DL (ref 2.3–3.5)
GLUCOSE BLD-MCNC: 103 MG/DL (ref 60–115)
GLUCOSE BLD-MCNC: 94 MG/DL (ref 60–115)
GLUCOSE BLD-MCNC: 98 MG/DL (ref 70–99)
HBA1C MFR BLD: 5.5 % (ref 4.8–5.9)
HCO3 ARTERIAL: 42.3 MMOL/L (ref 21–29)
HCT VFR BLD CALC: 51.3 % (ref 37–47)
HEMOGLOBIN: 16.5 G/DL (ref 12–16)
HEMOGLOBIN: 19.4 GM/DL (ref 12–16)
INR BLD: 1.1
LACTATE: 1.13 MMOL/L (ref 0.4–2)
LYMPHOCYTES ABSOLUTE: 1.2 K/UL (ref 1–4.8)
LYMPHOCYTES RELATIVE PERCENT: 15.5 %
MCH RBC QN AUTO: 32.1 PG (ref 27–31.3)
MCHC RBC AUTO-ENTMCNC: 32.2 % (ref 33–37)
MCV RBC AUTO: 99.7 FL (ref 82–100)
MONOCYTES ABSOLUTE: 0.5 K/UL (ref 0.2–0.8)
MONOCYTES RELATIVE PERCENT: 6.5 %
NEUTROPHILS ABSOLUTE: 6.1 K/UL (ref 1.4–6.5)
NEUTROPHILS RELATIVE PERCENT: 77 %
O2 SAT, ARTERIAL: 82 % (ref 93–100)
PCO2 ARTERIAL: 82 MM HG (ref 35–45)
PDW BLD-RTO: 17 % (ref 11.5–14.5)
PERFORMED ON: ABNORMAL
PERFORMED ON: NORMAL
PH ARTERIAL: 7.32 (ref 7.35–7.45)
PLATELET # BLD: 204 K/UL (ref 130–400)
PO2 ARTERIAL: 54 MM HG (ref 75–108)
POC CHLORIDE: 94 MEQ/L (ref 99–110)
POC CREATININE: 1 MG/DL (ref 0.6–1.1)
POC FIO2: 3
POC HEMATOCRIT: 57 % (ref 36–48)
POC POTASSIUM: 4.5 MEQ/L (ref 3.5–5.1)
POC SAMPLE TYPE: ABNORMAL
POC SODIUM: 142 MEQ/L (ref 136–145)
POTASSIUM SERPL-SCNC: 4.9 MEQ/L (ref 3.4–4.9)
PRO-BNP: 1088 PG/ML
PROTHROMBIN TIME: 14.2 SEC (ref 12.3–14.9)
RBC # BLD: 5.15 M/UL (ref 4.2–5.4)
SODIUM BLD-SCNC: 138 MEQ/L (ref 135–144)
TCO2 ARTERIAL: 45 (ref 22–29)
TOTAL PROTEIN: 7.1 G/DL (ref 6.3–8)
TROPONIN: <0.01 NG/ML (ref 0–0.01)
WBC # BLD: 8 K/UL (ref 4.8–10.8)

## 2021-06-11 PROCEDURE — 83605 ASSAY OF LACTIC ACID: CPT

## 2021-06-11 PROCEDURE — 84132 ASSAY OF SERUM POTASSIUM: CPT

## 2021-06-11 PROCEDURE — 2060000000 HC ICU INTERMEDIATE R&B

## 2021-06-11 PROCEDURE — 2580000003 HC RX 258: Performed by: NURSE PRACTITIONER

## 2021-06-11 PROCEDURE — 82565 ASSAY OF CREATININE: CPT

## 2021-06-11 PROCEDURE — 83036 HEMOGLOBIN GLYCOSYLATED A1C: CPT

## 2021-06-11 PROCEDURE — 85379 FIBRIN DEGRADATION QUANT: CPT

## 2021-06-11 PROCEDURE — 6360000002 HC RX W HCPCS: Performed by: NURSE PRACTITIONER

## 2021-06-11 PROCEDURE — 93005 ELECTROCARDIOGRAM TRACING: CPT | Performed by: EMERGENCY MEDICINE

## 2021-06-11 PROCEDURE — 85025 COMPLETE CBC W/AUTO DIFF WBC: CPT

## 2021-06-11 PROCEDURE — 82330 ASSAY OF CALCIUM: CPT

## 2021-06-11 PROCEDURE — 99213 OFFICE O/P EST LOW 20 MIN: CPT | Performed by: PHYSICIAN ASSISTANT

## 2021-06-11 PROCEDURE — 84484 ASSAY OF TROPONIN QUANT: CPT

## 2021-06-11 PROCEDURE — 36600 WITHDRAWAL OF ARTERIAL BLOOD: CPT

## 2021-06-11 PROCEDURE — 82435 ASSAY OF BLOOD CHLORIDE: CPT

## 2021-06-11 PROCEDURE — 70450 CT HEAD/BRAIN W/O DYE: CPT

## 2021-06-11 PROCEDURE — 85610 PROTHROMBIN TIME: CPT

## 2021-06-11 PROCEDURE — 80053 COMPREHEN METABOLIC PANEL: CPT

## 2021-06-11 PROCEDURE — 36415 COLL VENOUS BLD VENIPUNCTURE: CPT

## 2021-06-11 PROCEDURE — 6360000004 HC RX CONTRAST MEDICATION: Performed by: EMERGENCY MEDICINE

## 2021-06-11 PROCEDURE — 94660 CPAP INITIATION&MGMT: CPT

## 2021-06-11 PROCEDURE — 83880 ASSAY OF NATRIURETIC PEPTIDE: CPT

## 2021-06-11 PROCEDURE — 71275 CT ANGIOGRAPHY CHEST: CPT

## 2021-06-11 PROCEDURE — 84295 ASSAY OF SERUM SODIUM: CPT

## 2021-06-11 PROCEDURE — 82803 BLOOD GASES ANY COMBINATION: CPT

## 2021-06-11 PROCEDURE — 85014 HEMATOCRIT: CPT

## 2021-06-11 PROCEDURE — 71046 X-RAY EXAM CHEST 2 VIEWS: CPT

## 2021-06-11 PROCEDURE — 6360000002 HC RX W HCPCS: Performed by: EMERGENCY MEDICINE

## 2021-06-11 PROCEDURE — 99285 EMERGENCY DEPT VISIT HI MDM: CPT

## 2021-06-11 RX ORDER — NICOTINE POLACRILEX 4 MG
15 LOZENGE BUCCAL PRN
Status: DISCONTINUED | OUTPATIENT
Start: 2021-06-11 | End: 2021-06-15 | Stop reason: HOSPADM

## 2021-06-11 RX ORDER — DEXTROSE MONOHYDRATE 50 MG/ML
100 INJECTION, SOLUTION INTRAVENOUS PRN
Status: DISCONTINUED | OUTPATIENT
Start: 2021-06-11 | End: 2021-06-15 | Stop reason: HOSPADM

## 2021-06-11 RX ORDER — ACETAMINOPHEN 650 MG/1
650 SUPPOSITORY RECTAL EVERY 6 HOURS PRN
Status: DISCONTINUED | OUTPATIENT
Start: 2021-06-11 | End: 2021-06-15 | Stop reason: HOSPADM

## 2021-06-11 RX ORDER — SODIUM CHLORIDE 0.9 % (FLUSH) 0.9 %
10 SYRINGE (ML) INJECTION EVERY 12 HOURS SCHEDULED
Status: DISCONTINUED | OUTPATIENT
Start: 2021-06-11 | End: 2021-06-15 | Stop reason: HOSPADM

## 2021-06-11 RX ORDER — POLYETHYLENE GLYCOL 3350 17 G/17G
17 POWDER, FOR SOLUTION ORAL DAILY PRN
Status: DISCONTINUED | OUTPATIENT
Start: 2021-06-11 | End: 2021-06-15 | Stop reason: HOSPADM

## 2021-06-11 RX ORDER — FUROSEMIDE 10 MG/ML
60 INJECTION INTRAMUSCULAR; INTRAVENOUS ONCE
Status: COMPLETED | OUTPATIENT
Start: 2021-06-11 | End: 2021-06-11

## 2021-06-11 RX ORDER — SODIUM CHLORIDE 0.9 % (FLUSH) 0.9 %
10 SYRINGE (ML) INJECTION PRN
Status: DISCONTINUED | OUTPATIENT
Start: 2021-06-11 | End: 2021-06-15 | Stop reason: HOSPADM

## 2021-06-11 RX ORDER — FUROSEMIDE 10 MG/ML
60 INJECTION INTRAMUSCULAR; INTRAVENOUS DAILY
Status: DISCONTINUED | OUTPATIENT
Start: 2021-06-12 | End: 2021-06-12

## 2021-06-11 RX ORDER — SENNA PLUS 8.6 MG/1
1 TABLET ORAL DAILY PRN
Status: DISCONTINUED | OUTPATIENT
Start: 2021-06-11 | End: 2021-06-15 | Stop reason: HOSPADM

## 2021-06-11 RX ORDER — ALBUTEROL SULFATE 2.5 MG/3ML
2.5 SOLUTION RESPIRATORY (INHALATION)
Status: DISCONTINUED | OUTPATIENT
Start: 2021-06-11 | End: 2021-06-15 | Stop reason: HOSPADM

## 2021-06-11 RX ORDER — SODIUM CHLORIDE 9 MG/ML
25 INJECTION, SOLUTION INTRAVENOUS PRN
Status: DISCONTINUED | OUTPATIENT
Start: 2021-06-11 | End: 2021-06-15 | Stop reason: HOSPADM

## 2021-06-11 RX ORDER — IPRATROPIUM BROMIDE AND ALBUTEROL SULFATE 2.5; .5 MG/3ML; MG/3ML
1 SOLUTION RESPIRATORY (INHALATION) EVERY 4 HOURS PRN
Status: DISCONTINUED | OUTPATIENT
Start: 2021-06-11 | End: 2021-06-11

## 2021-06-11 RX ORDER — ONDANSETRON 2 MG/ML
4 INJECTION INTRAMUSCULAR; INTRAVENOUS EVERY 6 HOURS PRN
Status: DISCONTINUED | OUTPATIENT
Start: 2021-06-11 | End: 2021-06-15 | Stop reason: HOSPADM

## 2021-06-11 RX ORDER — ONDANSETRON 4 MG/1
4 TABLET, ORALLY DISINTEGRATING ORAL EVERY 8 HOURS PRN
Status: DISCONTINUED | OUTPATIENT
Start: 2021-06-11 | End: 2021-06-15 | Stop reason: HOSPADM

## 2021-06-11 RX ORDER — ACETAMINOPHEN 325 MG/1
650 TABLET ORAL EVERY 6 HOURS PRN
Status: DISCONTINUED | OUTPATIENT
Start: 2021-06-11 | End: 2021-06-15 | Stop reason: HOSPADM

## 2021-06-11 RX ORDER — IPRATROPIUM BROMIDE AND ALBUTEROL SULFATE 2.5; .5 MG/3ML; MG/3ML
1 SOLUTION RESPIRATORY (INHALATION) 3 TIMES DAILY
Status: DISCONTINUED | OUTPATIENT
Start: 2021-06-12 | End: 2021-06-15 | Stop reason: HOSPADM

## 2021-06-11 RX ORDER — DEXTROSE MONOHYDRATE 25 G/50ML
12.5 INJECTION, SOLUTION INTRAVENOUS PRN
Status: DISCONTINUED | OUTPATIENT
Start: 2021-06-11 | End: 2021-06-15 | Stop reason: HOSPADM

## 2021-06-11 RX ADMIN — FUROSEMIDE 60 MG: 10 INJECTION, SOLUTION INTRAMUSCULAR; INTRAVENOUS at 18:37

## 2021-06-11 RX ADMIN — ENOXAPARIN SODIUM 40 MG: 40 INJECTION SUBCUTANEOUS at 21:10

## 2021-06-11 RX ADMIN — SODIUM CHLORIDE, PRESERVATIVE FREE 10 ML: 5 INJECTION INTRAVENOUS at 21:10

## 2021-06-11 RX ADMIN — IOPAMIDOL 100 ML: 612 INJECTION, SOLUTION INTRAVENOUS at 19:21

## 2021-06-11 ASSESSMENT — ENCOUNTER SYMPTOMS
SINUS PRESSURE: 0
CHEST TIGHTNESS: 0
ABDOMINAL PAIN: 0
SHORTNESS OF BREATH: 1
BACK PAIN: 0
ABDOMINAL PAIN: 0
SORE THROAT: 0
SINUS PAIN: 0
VOMITING: 0
SHORTNESS OF BREATH: 1
VOMITING: 0
CHEST TIGHTNESS: 0
NAUSEA: 0
DIARRHEA: 0
COUGH: 1
NAUSEA: 0
SORE THROAT: 0
EYE PAIN: 0

## 2021-06-11 ASSESSMENT — PAIN SCALES - GENERAL: PAINLEVEL_OUTOF10: 7

## 2021-06-11 ASSESSMENT — PAIN DESCRIPTION - LOCATION: LOCATION: LEG

## 2021-06-11 NOTE — ED NOTES
Patient left for   xray via [address] [place] [address], RN  06/11/21 Delia [id number]-A [id number], RN  06/11/21 [id number]

## 2021-06-11 NOTE — ED PROVIDER NOTES
[address] [facility] ED  EMERGENCY DEPARTMENT ENCOUNTER      Pt Name: Adrienne Young  MRN: [id number]  Armsyarongfjanelle 1965  Date of evaluation: 6/11/2021  Provider: Chanelle Bo DO    CHIEF COMPLAINT       Chief Complaint   Patient presents with    Shortness of Breath         HISTORY OF PRESENT ILLNESS   (Location/Symptom, Timing/Onset, Context/Setting, Quality, Duration, Modifying Factors, Severity)  Note limiting factors. Adrienne Young is a 55 y.o. female who presents to the emergency department . Patient comes in with shortness of breath. Patient states that she uses her oxygen at home on 3 L she will saturate at about 87%. If she tries to use her compressor to go anywhere she feels extremely short of breath and cannot get her oxygen up. She states that she has been sleeping 23 out of 24 hours a day. She does not know why but she needs to find out why because she cannot take care of her disabled child. Her legs are swollen but actually less swollen than usual.  Not having chest pain but occasionally she has some palpitations. No cough or congestion. She does not have a lung doctor at this time. Has seen Dr. Tracie Francois from cardiology whose note states that she is supposed to be on BiPAP but does not use it. HPI    Nursing Notes were reviewed. REVIEW OF SYSTEMS    (2-9 systems for level 4, 10 or more for level 5)     Review of Systems   Constitutional: Positive for fatigue. Negative for activity change, appetite change and fever. HENT: Negative for congestion and sore throat. Eyes: Negative for pain and visual disturbance. Respiratory: Positive for shortness of breath. Negative for chest tightness. Cardiovascular: Positive for palpitations and leg swelling. Negative for chest pain. Gastrointestinal: Negative for abdominal pain, nausea and vomiting. Endocrine: Negative for polydipsia. Genitourinary: Negative for flank pain and urgency.    Musculoskeletal: Negative for gait problem and neck stiffness. Skin: Negative for rash. Neurological: Negative for weakness, light-headedness and headaches. Psychiatric/Behavioral: Negative for confusion and sleep disturbance. Except as noted above the remainder of the review of systems was reviewed and negative. PAST MEDICAL HISTORY     Past Medical History:   Diagnosis Date    Anxiety     Asthma     [facility]congestive heart failure[facility])     Chronic back pain     COPD [facility]chronic obstructive pulmonary disease) ([facility])     Depression     Hypertension     Obesity     Pelvic pain in female 2/15/2018    Type 2 diabetes mellitus without complication ([facility] [address] 3/3/2211         SURGICAL HISTORY       Past Surgical History:   Procedure Laterality Date    BLADDER SUSPENSION  02/2014    CARPAL TUNNEL RELEASE  89 or 90    right hand     HYSTERECTOMY  2009    full    [place] CMBND ANTERPOST COLPORRAPHY W/CYSTO W/NTRCL RPR N/A 2/19/2018    EXCISION OF VAGINAL MESH, CYSTOSCOPY performed by Solange Jimenez DO at [facility]      27 yrs ago         CURRENT MEDICATIONS       Previous Medications    ALBUTEROL SULFATE  (90 BASE) MCG/ACT INHALER    Inhale 2 puffs into the lungs 2 times daily    ATORVASTATIN (LIPITOR) 10 MG TABLET    Take 1 tablet by mouth nightly    BUMETANIDE (BUMEX) 2 MG TABLET    Take 1 tablet by mouth 2 times daily Take with K+ supplement. BUPROPION (WELLBUTRIN XL) 150 MG EXTENDED RELEASE TABLET    TAKE 1 TABLET BY MOUTH EVERY DAY IN THE MORNING    CARVEDILOL (COREG) 3.125 MG TABLET    TAKE 1 TABLET BY MOUTH TWICE A DAY    ERGOTAMINE-CAFFEINE (CAFERGOT) 1-100 MG PER TABLET    Two tablets at onset of attack; then 1 tablet every 30 minutes as needed; maximum: 6 tablets per attack; do not exceed 10 tablets/week. HANDICAP PLACARD MISC    by Does not apply route Good for 5 years. Expires 3/27/2026.     HOSPITAL BED MISC    by Does not apply route    IPRATROPIUM-ALBUTEROL (DUONEB) 0.5-2.5 (3) MG/3ML SOLN NEBULIZER SOLUTION    Inhale 3 mLs into the lungs every 4 hours as needed for Shortness of Breath    LOSARTAN (COZAAR) 100 MG TABLET    TAKE 1/2 TABLET BY MOUTH EVERY DAY    MISC. DEVICES (BARIATRIC ROLLATOR) MISC    To help with ambulation related to CHF/shortness of breath with activity. NYSTATIN (MYCOSTATIN) 237775 UNIT/GM CREAM    Apply topically 2 times daily. NYSTATIN (MYCOSTATIN) 275750 UNIT/GM POWDER    Apply 3 times daily. OXYBUTYNIN (DITROPAN-XL) 5 MG EXTENDED RELEASE TABLET    Take 1 tablet by mouth daily    POTASSIUM CHLORIDE (KLOR-CON M) 10 MEQ EXTENDED RELEASE TABLET    Take 2 tablets by mouth daily With water pill (bumex)    SILVER SULFADIAZINE (SILVADENE) 1 % CREAM    Apply topically daily. VITAMIN D (ERGOCALCIFEROL) 1.25 MG (46136 UT) CAPS CAPSULE    Take 1 capsule by mouth Twice a Week       ALLERGIES     Food and Other    FAMILY HISTORY       Family History   Problem Relation Age of Onset    Alzheimer's Disease Father     High Blood Pressure Father     Stroke Father         6 strokes in one year   Jose Buck Asthma Sister     Asthma Brother     No Known Problems Daughter     No Known Problems Daughter           SOCIAL HISTORY       Social History     Socioeconomic History    Marital status:       Spouse name: None    Number of children: None    Years of education: None    Highest education level: None   Occupational History    None   Tobacco Use    Smoking status: Current Every Day Smoker     Packs/day: 1.00     Types: Cigarettes     Start date: 5/6/1979    Smokeless tobacco: Never Used   Substance and Sexual Activity    Alcohol use: No    Drug use: No    Sexual activity: Yes     Partners: Male   Other Topics Concern    None   Social History Narrative    None     Social Determinants of Health     Financial Resource Strain: Low Risk     Difficulty of Paying Living Expenses: Not hard at all   Food Insecurity:     Worried About [address] in the Last Year:     Samantha figueroa Food in the Last Year:    Transportation Needs:     Lack of Transportation (Medical):  Lack of Transportation (Non-Medical):    Physical Activity:     Days of Exercise per Week:     Minutes of Exercise per Session:    Stress:     Feeling of Stress :    Social Connections:     Frequency of Communication with Friends and Family:     Frequency of Social Gatherings with Friends and Family:     Attends [employer] Services:     Active Member of Clubs or Organizations:     Attends Club or Organization Meetings:     Marital Status:    Intimate Partner Violence:     Fear of Current or Ex-Partner:     Emotionally Abused:     Physically Abused:     Sexually Abused:        SCREENINGS                        PHYSICAL EXAM    (up to 7 for level 4, 8 or more for level 5)     ED Triage Vitals [06/11/21 1551]   BP Temp Temp Source Pulse Resp SpO2 Height Weight   137/86 98.4 °F (36.9 °C) Oral 91 24 (!) 88 % 5' 5\" (1.651 m) (!) 350 lb (158.8 kg)       Physical Exam  Vitals and nursing note reviewed. Constitutional:       General: She is not in acute distress. Appearance: She is well-developed. She is obese. She is not diaphoretic. HENT:      Head: Normocephalic and atraumatic. Right Ear: External ear normal.      Left Ear: External ear normal.      Mouth/Throat:      Pharynx: No oropharyngeal exudate. Eyes:      Conjunctiva/sclera: Conjunctivae normal.      Pupils: Pupils are equal, round, and reactive to light. Neck:      Thyroid: No thyromegaly. Vascular: No JVD. Trachea: No tracheal deviation. Cardiovascular:      Rate and Rhythm: Normal rate. Heart sounds: Normal heart sounds. No murmur heard. Pulmonary:      Effort: Pulmonary effort is normal. No respiratory distress. Breath sounds: Decreased breath sounds present. No wheezing, rhonchi or rales. Abdominal:      General: Bowel sounds are normal.      Palpations: Abdomen is soft. Tenderness:  There is no abdominal tenderness. There is no guarding. Musculoskeletal:         General: Normal range of motion. Cervical back: Normal range of motion and neck supple. Right lower leg: Edema present. Left lower leg: Edema present. Skin:     General: Skin is warm and dry. Findings: No rash. Neurological:      General: No focal deficit present. Mental Status: She is alert and oriented to person, place, and time. Cranial Nerves: No cranial nerve deficit. Psychiatric:         Behavior: Behavior normal.         DIAGNOSTIC RESULTS     EKG: All EKG's are interpreted by the Emergency Department Physician who either signs or Co-signs this chart in the absence of a cardiologist.    Sinus rhythm T wave inversion inferiorly 89 bpm    RADIOLOGY:   Non-plain film images such as CT, Ultrasound and MRI are read by the radiologist. Plain radiographic images are visualized and preliminarily interpreted by the emergency physician with the below findings:    Chest x-ray shows increased interstitial markings    Interpretation per the Radiologist below, if available at the time of this note:    No orders to display         ED BEDSIDE ULTRASOUND:   Performed by ED Physician - none    LABS:  Labs Reviewed - No data to display    All other labs were within normal range or not returned as of this dictation. EMERGENCY DEPARTMENT COURSE and DIFFERENTIAL DIAGNOSIS/MDM:   Vitals:    Vitals:    06/11/21 1551   BP: 137/86   Pulse: 91   Resp: 24   Temp: 98.4 °F (36.9 °C)   TempSrc: Oral   SpO2: (!) 88%   Weight: (!) 350 lb (158.8 kg)   Height: 5' 5\" (1.651 m)       Patient comes in with shortness of breath hypoxia hypercarbia consistent with acute respiratory failure. Severe peripheral edema and increased interstitial markings on chest x-ray. BNP over 1000. Most likely patient is acute on chronic congestive heart failure with acute on chronic respiratory failure doing much better on BiPAP.   To be admitted to the hospitalist service. [facility]      REASSESSMENT          CRITICAL CARE TIME   Total Critical Care time was 30 minutes, excluding separately reportable procedures. There was a high probability of clinically significant/life threatening deterioration in the patient's condition which required my urgent intervention. CONSULTS:  None    PROCEDURES:  Unless otherwise noted below, none     Procedures        FINAL IMPRESSION      1. Acute respiratory failure with hypoxia and hypercapnia (HCC)    2. Somnolence    3. Diastolic congestive heart failure, unspecified HF chronicity ([facility] [address]          DISPOSITION/PLAN   DISPOSITION  admit      PATIENT REFERRED TO:  No follow-up provider specified. DISCHARGE MEDICATIONS:  New Prescriptions    No medications on file     Controlled Substances Monitoring:     No flowsheet data found.     (Please note that portions of this note were completed with a voice recognition program.  Efforts were made to edit the dictations but occasionally words are mis-transcribed.)    Colton Diaz DO (electronically signed)  Attending Emergency Physician           Colton Diaz DO  06/11/21 [id number]

## 2021-06-11 NOTE — PROGRESS NOTES
[address] [facility] Encounter  CHIEF COMPLAINT       Chief Complaint   Patient presents with    Shortness of Breath       HISTORY OF PRESENT ILLNESS   Declan Felton is a 55 y.o. female who presents with:  HPI   Patient is presenting today with SOB and swelling of bilateral legs. Having trouble breathing. On oxygen at home. 3L unable to get above 85% while on oxygen. History of CHF. REVIEW OF SYSTEMS     Review of Systems   Constitutional: Negative for activity change, appetite change, chills and fever. HENT: Negative for congestion, drooling, sinus pressure, sinus pain and sore throat. Eyes: Negative for visual disturbance. Respiratory: Positive for cough and shortness of breath. Negative for chest tightness. Cardiovascular: Positive for chest pain. Gastrointestinal: Negative for abdominal pain, diarrhea, nausea and vomiting. Endocrine: Negative for cold intolerance. Genitourinary: Negative for dysuria, flank pain, frequency and hematuria. Musculoskeletal: Positive for myalgias (bilateral calves). Negative for arthralgias and back pain. Skin: Negative for rash. Allergic/Immunologic: Negative for food allergies. Neurological: Negative for weakness, light-headedness, numbness and headaches. Hematological: Does not bruise/bleed easily. Psychiatric/Behavioral: Negative. PAST MEDICAL HISTORY         Diagnosis Date    Anxiety     Asthma     CHF (congestive heart failure) (HCC)     Chronic back pain     COPD (chronic obstructive pulmonary disease) (HCC)     Depression     Hypertension     Obesity     Pelvic pain in female 2/15/2018    Type 2 diabetes mellitus without complication ([facility] [address] 8/6/6135     SURGICAL HISTORY     Patient  has a past surgical history that includes Carpal tunnel release (89 or 90); Hysterectomy (2009);  Tubal ligation; bladder suspension (02/2014); and pr cmbnd anterpost colporraphy w/cysto w/ntrcl rpr (N/A, 2/19/2018). CURRENT MEDICATIONS       Previous Medications    ALBUTEROL SULFATE  (90 BASE) MCG/ACT INHALER    Inhale 2 puffs into the lungs 2 times daily    ATORVASTATIN (LIPITOR) 10 MG TABLET    Take 1 tablet by mouth nightly    BUMETANIDE (BUMEX) 2 MG TABLET    Take 1 tablet by mouth 2 times daily Take with K+ supplement. BUPROPION (WELLBUTRIN XL) 150 MG EXTENDED RELEASE TABLET    TAKE 1 TABLET BY MOUTH EVERY DAY IN THE MORNING    CARVEDILOL (COREG) 3.125 MG TABLET    TAKE 1 TABLET BY MOUTH TWICE A DAY    ERGOTAMINE-CAFFEINE (CAFERGOT) 1-100 MG PER TABLET    Two tablets at onset of attack; then 1 tablet every 30 minutes as needed; maximum: 6 tablets per attack; do not exceed 10 tablets/week. HANDICAP PLACARD MIS    by Does not apply route Good for 5 years. Expires 3/27/2026. HOSPITAL BED MISC    by Does not apply route    IPRATROPIUM-ALBUTEROL (DUONEB) 0.5-2.5 (3) MG/3ML SOLN NEBULIZER SOLUTION    Inhale 3 mLs into the lungs every 4 hours as needed for Shortness of Breath    LOSARTAN (COZAAR) 100 MG TABLET    TAKE 1/2 TABLET BY MOUTH EVERY DAY    MISC. DEVICES (BARIATRIC ROLLATOR) MISC    To help with ambulation related to CHF/shortness of breath with activity. NYSTATIN (MYCOSTATIN) 750665 UNIT/GM CREAM    Apply topically 2 times daily. NYSTATIN (MYCOSTATIN) 044817 UNIT/GM POWDER    Apply 3 times daily. OXYBUTYNIN (DITROPAN-XL) 5 MG EXTENDED RELEASE TABLET    Take 1 tablet by mouth daily    POTASSIUM CHLORIDE (KLOR-CON M) 10 MEQ EXTENDED RELEASE TABLET    Take 2 tablets by mouth daily With water pill (bumex)    SILVER SULFADIAZINE (SILVADENE) 1 % CREAM    Apply topically daily. VITAMIN D (ERGOCALCIFEROL) 1.25 MG (25970 UT) CAPS CAPSULE    Take 1 capsule by mouth Twice a Week     ALLERGIES     Patient is is allergic to food and other.   FAMILY HISTORY     Patient'sfamily history includes Alzheimer's Disease in her father; Asthma in her brother and sister; High Blood Pressure in her father; No Known Problems in her daughter and daughter; Stroke in her father. SOCIAL HISTORY     Patient  reports that she has been smoking cigarettes. She started smoking about 42 years ago. She has been smoking about 1.00 pack per day. She has never used smokeless tobacco. She reports that she does not drink alcohol and does not use drugs. PHYSICAL EXAM     VITALS   ,  , Pulse: 98,  , SpO2: (!) 72 %  Physical Exam  Vitals reviewed. Cardiovascular:      Comments: Bruising in bilateral   Musculoskeletal:      Right lower leg: Edema present. Left lower leg: Edema present. READY CARE COURSE   Labs:  No results found for this visit on 06/11/21. IMAGING:  No orders to display     Scheduled Meds:  Continuous Infusions:  PRN Meds:. PROCEDURES:  FINAL IMPRESSION      1. Hypoxia    2. Low oxygen saturation      DISPOSITION/PLAN   Patient was sent to the ER due to oxygen saturation at 72% in office. Patient is not on oxygen. ER notified that the patient is coming in. On this date 6/11/2021 I have spent 20 minutes reviewing previous notes, test results and face to face with the patient discussing the diagnosis and importance of compliance with the treatment plan as well as documenting on the day of the visit. PATIENT REFERRED TO:  Return for Follow up with PCP. DISCHARGE MEDICATIONS:  New Prescriptions    No medications on file     Cannot display discharge medications since this patient is expected but has not yet arrived.        Marisela Claude, [place]

## 2021-06-11 NOTE — H&P
[place] [address] MEDICINE    HISTORY AND PHYSICAL EXAM    PATIENT NAME:  Licha Molina    MRN:  [id number]  SERVICE DATE:  6/11/2021   SERVICE TIME:  6:24 PM    Primary Care Physician: Linette Reina PA-C     SUBJECTIVE  CHIEF COMPLAINT:  hypoxia    HPI:  Licha Molina is a 55 y.o., , female who  has a past medical history of Anxiety, Asthma, CHF (congestive heart failure) ([address] Chronic back pain, COPD (chronic obstructive pulmonary disease) ([address] Depression, Hypertension, Obesity, Pelvic pain in female, and Type 2 diabetes mellitus without complication ([address] that is hospitalized for acute on chronic hypercapnic and hypoxic respiratory failure. Presented to the ED today after direction from her PCP for hypoxemia. At her out patient appointment she was found to have a sat in the 76s. On 3 L nasal cannula at her baseline O2 sat is 87%. When she is using her compressor she maintain sats in the low 80s. Reports that over the last month she has been sleeping 23/24 hours/day. Reports fall x3 as she often times is lightheaded and feels dizzy. No reported injuries. Lives at home with her disabled son and her boyfriend. States that she does not have pulmonologist and has not followed up recently with cardiology. Denies chest pain, N/V/D.        PAST MEDICAL HISTORY:    Past Medical History:   Diagnosis Date    Anxiety     Asthma     [facility]     Chronic back pain     COPD (chronic obstructive pulmonary disease) ([facility]     Depression     Hypertension     Obesity     Pelvic pain in female 2/15/2018    Type 2 diabetes mellitus without complication ([facility] 1/3/6934     PAST SURGICAL HISTORY:    Past Surgical History:   Procedure Laterality Date    BLADDER SUSPENSION  02/2014    CARPAL TUNNEL RELEASE  89 or 90    right hand     HYSTERECTOMY  2009    full    DE CMBND ANTERPOST COLPORRAPHY W/CYSTO W/NTRCL RPR N/A 2/19/2018    EXCISION OF VAGINAL MESH, CYSTOSCOPY performed by Marshall Bradley DO at [facility]      27 yrs ago     FAMILY HISTORY:    Family History   Problem Relation Age of Onset    Alzheimer's Disease Father     High Blood Pressure Father     Stroke Father         11 strokes in one year   [employer] Asthma Sister     Asthma Brother     No Known Problems Daughter     No Known Problems Daughter      SOCIAL HISTORY:    Social History     Socioeconomic History    Marital status:      Spouse name: Not on file    Number of children: Not on file    Years of education: Not on file    Highest education level: Not on file   Occupational History    Not on file   Tobacco Use    Smoking status: Current Every Day Smoker     Packs/day: 1.00     Types: Cigarettes     Start date: 5/6/1979    Smokeless tobacco: Never Used   Substance and Sexual Activity    Alcohol use: No    Drug use: No    Sexual activity: Yes     Partners: Male   Other Topics Concern    Not on file   Social History Narrative    Not on file     Social Determinants of Health     Financial Resource Strain: Low Risk     Difficulty of Paying Living Expenses: Not hard at all   Food Insecurity:     Worried About [address] in the Last Year:     [address] in the Last Year:    Transportation Needs:     Lack of Transportation (Medical):      Lack of Transportation (Non-Medical):    Physical Activity:     Days of Exercise per Week:     Minutes of Exercise per Session:    Stress:     Feeling of Stress :    Social Connections:     Frequency of Communication with Friends and Family:     Frequency of Social Gatherings with Friends and Family:     Attends Mandaeism Services:     Active Member of Clubs or Organizations:     Attends Club or Organization Meetings:     Marital Status:    Intimate Partner Violence:     Fear of Current or Ex-Partner:     Emotionally Abused:     Physically Abused:     Sexually Abused:      MEDICATIONS:   Prior to Admission medications Medication Sig Start Date End Date Taking? Authorizing Provider   bumetanide (BUMEX) 2 MG tablet Take 1 tablet by mouth 2 times daily Take with K+ supplement. 4/29/21   Zoie Ervin PA-C   ipratropium-albuterol (DUONEB) 0.5-2.5 (3) MG/3ML SOLN nebulizer solution Inhale 3 mLs into the lungs every 4 hours as needed for Shortness of Breath 4/13/21   Zoie Ervin PA-C   Misc. Devices (BARIATRIC ROLLATOR) MISC To help with ambulation related to CHF/shortness of breath with activity. 4/13/21   Zoie Ervin PA-C   silver sulfADIAZINE (SILVADENE) 1 % cream Apply topically daily. 4/13/21   Zoie Ervin PA-C   potassium chloride (KLOR-CON M) 10 MEQ extended release tablet Take 2 tablets by mouth daily With water pill (bumex) 4/13/21   Zoie Ervin PA-C   atorvastatin (LIPITOR) 10 MG tablet Take 1 tablet by mouth nightly 4/1/21   Zoie Ervin PA-C   vitamin D (ERGOCALCIFEROL) 1.25 MG (98223 UT) CAPS capsule Take 1 capsule by mouth Twice a Week 4/1/21   Zoie Ervin PA-C   carvedilol (COREG) 3.125 MG tablet TAKE 1 TABLET BY MOUTH TWICE A DAY 3/26/21   Zoie Ervin PA-C   nystatin (MYCOSTATIN) 688888 UNIT/GM powder Apply 3 times daily. 3/26/21   Zoie Ervin PA-C   Handicap Placard MISC by Does not apply route Good for 5 years. Expires 3/27/2026. 3/26/21   Zoie Ervin PA-C   losartan (COZAAR) 100 MG tablet TAKE 1/2 TABLET BY MOUTH EVERY DAY 3/9/21   Zoie Ervin PA-C   oxybutynin (DITROPAN-XL) 5 MG extended release tablet Take 1 tablet by mouth daily 2/23/21   Zoie Ervin PA-C   buPROPion (WELLBUTRIN XL) 150 MG extended release tablet TAKE 1 TABLET BY MOUTH EVERY DAY IN THE MORNING 2/5/21   Zoie Ervin PA-C   ergotamine-caffeine (CAFERGOT) 1-100 MG per tablet Two tablets at onset of attack; then 1 tablet every 30 minutes as needed; maximum: 6 tablets per attack; do not exceed 10 tablets/week.  10/14/20   Ronel Garcia, [facility] M[facility] by Does not apply route 10/8/20   Lucien Estrada PA-C nystatin (MYCOSTATIN) 427101 UNIT/GM cream Apply topically 2 times daily. 6/19/20   Zoie Ervin PA-C   albuterol sulfate  (90 Base) MCG/ACT inhaler Inhale 2 puffs into the lungs 2 times daily 1/31/20   Zoie Ervin PA-C       ALLERGIES: Food and Other    REVIEW OF SYSTEM:   Review of Systems   Constitutional: Positive for fatigue. Respiratory: Positive for shortness of breath. Cardiovascular: Positive for leg swelling. Gastrointestinal:        Abdominal swelling   Neurological: Positive for light-headedness. All other systems reviewed and are negative. OBJECTIVE  PHYSICAL EXAM:   Physical Exam  Constitutional:       Appearance: She is morbidly obese. HENT:      Head: Normocephalic and atraumatic. Right Ear: Tympanic membrane, ear canal and external ear normal.      Left Ear: Tympanic membrane, ear canal and external ear normal.      Nose: Nose normal.      Mouth/Throat:      Mouth: Mucous membranes are dry. Pharynx: Oropharynx is clear. Eyes:      Conjunctiva/sclera: Conjunctivae normal.   Cardiovascular:      Rate and Rhythm: Normal rate and regular rhythm. Pulses:           Dorsalis pedis pulses are 1+ on the right side and 1+ on the left side. Pulmonary:      Breath sounds: Decreased breath sounds present. Abdominal:      Comments: Morbidly obese; edematous  Unable to appreciate organomegaly or masses due to body habitus   Musculoskeletal:      Cervical back: Normal range of motion and neck supple. Right lower leg: 3+ Edema present. Left lower leg: 3+ Edema present. Skin:     General: Skin is warm and dry. Capillary Refill: Capillary refill takes 2 to 3 seconds. Findings: Erythema present. Comments: Edema  Erythema to BLE   Neurological:      Mental Status: She is easily aroused. GCS: GCS eye subscore is 3. GCS verbal subscore is 5. GCS motor subscore is 6.       Comments: Easily arousable          /76   Pulse 81 Result Value Ref Range    Protime 14.2 12.3 - 14.9 sec    INR 1.1    POCT Arterial    Collection Time: 06/11/21  4:15 PM   Result Value Ref Range    POC Sodium 142 136 - 145 mEq/L    POC Potassium 4.5 3.5 - 5.1 mEq/L    POC Chloride 94 (L) 99 - 110 mEq/L    POC Glucose 103 60 - 115 mg/dl    POC Creatinine 1.0 0.6 - 1.1 mg/dL    GFR Non-African American 58 (A) >60    GFR African American >60 >60    Calcium, Ion 1.22 1.12 - 1.32 mmol/L    pH, Arterial 7.319 (L) 7.350 - 7.450    pCO2, Arterial 82 (HH) 35 - 45 mm Hg    pO2, Arterial 54 (HH) 75 - 108 mm Hg    HCO3, Arterial 42.3 (H) 21.0 - 29.0 mmol/L    Base Excess, Arterial 16 (H) -3 - 3    O2 Sat, Arterial 82 (LL) 93 - 100 %    TCO2, Arterial 45 (H) 22 - 29    Lactate 1.13 0.40 - 2.00 mmol/L    POC Hematocrit 57 (H) 36 - 48 %    Hemoglobin 19.4 (H) 12.0 - 16.0 gm/dL    FIO2 3.000     Sample Type ART     Performed on SEE BELOW    EKG 12 Lead - Chest Pain    Collection Time: 06/11/21  4:15 PM   Result Value Ref Range    Ventricular Rate 89 BPM    Atrial Rate 89 BPM    P-R Interval 144 ms    QRS Duration 76 ms    Q-T Interval 374 ms    QTc Calculation (Bazett) 455 ms    P Axis 43 degrees    R Axis -3 degrees    T Axis -7 degrees       IMAGING:  XR CHEST (2 VW)    Result Date: 6/11/2021  XR CHEST (2 VW): 6/11/2021 5:38 PM CLINICAL HISTORY:  sob . COMPARISON: None available. TECHNIQUE: A portable upright AP radiograph of the chest was obtained. FINDINGS: The cardiac silhouette is at the upper limits of normal which may be secondary to cardiomegaly versus pericardial effusion. The aorta is ectatic which most commonly correlates with chronic hypertension. There are diffuse increased interstitial pulmonary markings throughout both lungs which may be secondary to pulmonary edema. The findings may reflect chronic congestive heart failure, CHF. José Miguel         VTE Prophylaxis: lovenox    ASSESSMENT AND PLAN    Acute on chronic hypoxic and hypercapnic respiratory failure: Likely multifactorial including CHF, obesity hypoventilation syndrome, KARISSA. PCO2 on admission was 82. Continue BiPAP. Consult pulmonary and cardiology. Follow-up D-dimer results and CTA. Obtain echocardiogram.  ABG in a.m. Appears volume overloaded has significant edema including the abdominal pannus; BNP 1088; received furosemide in ED; continue diuresis. Duonebs PRN. Incentive Spirometry, Respiratory therapist assessment. Resume home meds. Goal K and Mg 4.0 and 2.0, respectively. Telemetry. Daily weights. Monitor for signs/ symptoms of volume overload. Elevate lower exts while at rest.  States that she does not have a CPAP machine however per review note she does have a CPAP machine at home but does not have a mask to use. Admits to not using CPAP in well over a year. Will need pulmonary consult for follow-up outpatient sd she currently does not have a pulmonologist. Cardiology consult. Polycythemia present likely secondary to KARISSA. Follow H&H    Fatigue: Reports excessive sleeping 23 out of 24 hours/day. States this is that been ongoing for over 1 month. Likely multifactorial we will check TSH levels, hypercapnia contributing factor. Reports fall x3 over the past month due to fatigue and dizziness. Denies back pain, numbess and tingling to lower extremities. Will obtain CTH to assess for other pathology. DMII: ISS, hypoglycemia protocol POCT Glucose TIDAC & QHS    Morbid obesity: Supportive care; encourage weight loss    Medical noncompliance: Continue education regarding risk of noncompliance up to including mortality. Social work to follow for community resources if indicated.     CAD: home meds    Plan of care discussed with: patient    SIGNATURE: YOLANDA Calvin NP  DATE: June 11, 2021  TIME: 6:24 PM   No admitting provider for patient encounter.  - supervising physician

## 2021-06-12 LAB
BASE EXCESS ARTERIAL: 25 (ref -3–3)
BASE EXCESS ARTERIAL: 30 (ref -3–3)
CALCIUM IONIZED: 1.16 MMOL/L (ref 1.12–1.32)
CALCIUM IONIZED: 1.16 MMOL/L (ref 1.12–1.32)
CALCIUM IONIZED: 1.22 MMOL/L (ref 1.12–1.32)
CALCIUM IONIZED: 1.23 MMOL/L (ref 1.12–1.32)
GFR AFRICAN AMERICAN: 51
GFR AFRICAN AMERICAN: 56
GFR AFRICAN AMERICAN: >60
GFR AFRICAN AMERICAN: >60
GFR NON-AFRICAN AMERICAN: 43
GFR NON-AFRICAN AMERICAN: 47
GFR NON-AFRICAN AMERICAN: 52
GFR NON-AFRICAN AMERICAN: 52
GLUCOSE BLD-MCNC: 102 MG/DL (ref 60–115)
GLUCOSE BLD-MCNC: 110 MG/DL (ref 60–115)
GLUCOSE BLD-MCNC: 116 MG/DL (ref 60–115)
GLUCOSE BLD-MCNC: 118 MG/DL (ref 60–115)
GLUCOSE BLD-MCNC: 126 MG/DL (ref 60–115)
GLUCOSE BLD-MCNC: 85 MG/DL (ref 60–115)
HCO3 ARTERIAL: 50.4 MMOL/L (ref 21–29)
HCO3 ARTERIAL: 51.8 MMOL/L (ref 21–29)
HCO3 ARTERIAL: 52.1 MMOL/L (ref 21–29)
HCO3 ARTERIAL: 56.3 MMOL/L (ref 21–29)
HEMOGLOBIN: 19.3 GM/DL (ref 12–16)
HEMOGLOBIN: 19.3 GM/DL (ref 12–16)
HEMOGLOBIN: 20 GM/DL (ref 12–16)
HEMOGLOBIN: 20.1 GM/DL (ref 12–16)
LACTATE: 0.55 MMOL/L (ref 0.4–2)
LACTATE: 0.56 MMOL/L (ref 0.4–2)
LACTATE: 0.57 MMOL/L (ref 0.4–2)
LACTATE: 0.58 MMOL/L (ref 0.4–2)
LV EF: 55 %
LVEF MODALITY: NORMAL
O2 SAT, ARTERIAL: 85 % (ref 93–100)
O2 SAT, ARTERIAL: 88 % (ref 93–100)
O2 SAT, ARTERIAL: 90 % (ref 93–100)
O2 SAT, ARTERIAL: 94 % (ref 93–100)
PCO2 ARTERIAL: 108 MM HG (ref 35–45)
PCO2 ARTERIAL: 113 MM HG (ref 35–45)
PCO2 ARTERIAL: 114 MM HG (ref 35–45)
PCO2 ARTERIAL: 94 MM HG (ref 35–45)
PERFORMED ON: ABNORMAL
PERFORMED ON: NORMAL
PH ARTERIAL: 7.27 (ref 7.35–7.45)
PH ARTERIAL: 7.29 (ref 7.35–7.45)
PH ARTERIAL: 7.3 (ref 7.35–7.45)
PH ARTERIAL: 7.34 (ref 7.35–7.45)
PO2 ARTERIAL: 63 MM HG (ref 75–108)
PO2 ARTERIAL: 67 MM HG (ref 75–108)
PO2 ARTERIAL: 71 MM HG (ref 75–108)
PO2 ARTERIAL: 80 MM HG (ref 75–108)
POC CHLORIDE: 88 MEQ/L (ref 99–110)
POC CHLORIDE: 88 MEQ/L (ref 99–110)
POC CHLORIDE: 89 MEQ/L (ref 99–110)
POC CHLORIDE: 89 MEQ/L (ref 99–110)
POC CREATININE: 1.1 MG/DL (ref 0.6–1.1)
POC CREATININE: 1.1 MG/DL (ref 0.6–1.1)
POC CREATININE: 1.2 MG/DL (ref 0.6–1.1)
POC CREATININE: 1.3 MG/DL (ref 0.6–1.1)
POC FIO2: 50
POC HEMATOCRIT: 57 % (ref 36–48)
POC HEMATOCRIT: 57 % (ref 36–48)
POC HEMATOCRIT: 59 % (ref 36–48)
POC HEMATOCRIT: 59 % (ref 36–48)
POC POTASSIUM: 4.2 MEQ/L (ref 3.5–5.1)
POC POTASSIUM: 4.2 MEQ/L (ref 3.5–5.1)
POC POTASSIUM: 4.5 MEQ/L (ref 3.5–5.1)
POC POTASSIUM: 4.7 MEQ/L (ref 3.5–5.1)
POC SAMPLE TYPE: ABNORMAL
POC SODIUM: 144 MEQ/L (ref 136–145)
POC SODIUM: 145 MEQ/L (ref 136–145)
POC SODIUM: 145 MEQ/L (ref 136–145)
POC SODIUM: 146 MEQ/L (ref 136–145)
TCO2 ARTERIAL: >50 (ref 22–29)

## 2021-06-12 PROCEDURE — 6360000002 HC RX W HCPCS: Performed by: NURSE PRACTITIONER

## 2021-06-12 PROCEDURE — 2060000000 HC ICU INTERMEDIATE R&B

## 2021-06-12 PROCEDURE — 6370000000 HC RX 637 (ALT 250 FOR IP): Performed by: INTERNAL MEDICINE

## 2021-06-12 PROCEDURE — 93306 TTE W/DOPPLER COMPLETE: CPT

## 2021-06-12 PROCEDURE — 99223 1ST HOSP IP/OBS HIGH 75: CPT | Performed by: INTERNAL MEDICINE

## 2021-06-12 PROCEDURE — 82565 ASSAY OF CREATININE: CPT

## 2021-06-12 PROCEDURE — 6360000002 HC RX W HCPCS: Performed by: INTERNAL MEDICINE

## 2021-06-12 PROCEDURE — 94761 N-INVAS EAR/PLS OXIMETRY MLT: CPT

## 2021-06-12 PROCEDURE — 82435 ASSAY OF BLOOD CHLORIDE: CPT

## 2021-06-12 PROCEDURE — 82330 ASSAY OF CALCIUM: CPT

## 2021-06-12 PROCEDURE — 82803 BLOOD GASES ANY COMBINATION: CPT

## 2021-06-12 PROCEDURE — 94660 CPAP INITIATION&MGMT: CPT

## 2021-06-12 PROCEDURE — 36600 WITHDRAWAL OF ARTERIAL BLOOD: CPT

## 2021-06-12 PROCEDURE — 84132 ASSAY OF SERUM POTASSIUM: CPT

## 2021-06-12 PROCEDURE — 83605 ASSAY OF LACTIC ACID: CPT

## 2021-06-12 PROCEDURE — 2580000003 HC RX 258: Performed by: NURSE PRACTITIONER

## 2021-06-12 PROCEDURE — 85014 HEMATOCRIT: CPT

## 2021-06-12 PROCEDURE — 2700000000 HC OXYGEN THERAPY PER DAY

## 2021-06-12 PROCEDURE — 94640 AIRWAY INHALATION TREATMENT: CPT

## 2021-06-12 PROCEDURE — 84295 ASSAY OF SERUM SODIUM: CPT

## 2021-06-12 RX ORDER — BUPROPION HYDROCHLORIDE 150 MG/1
150 TABLET ORAL DAILY
Status: DISCONTINUED | OUTPATIENT
Start: 2021-06-12 | End: 2021-06-15 | Stop reason: HOSPADM

## 2021-06-12 RX ORDER — CARVEDILOL 3.12 MG/1
3.12 TABLET ORAL 2 TIMES DAILY WITH MEALS
Status: DISCONTINUED | OUTPATIENT
Start: 2021-06-12 | End: 2021-06-15 | Stop reason: HOSPADM

## 2021-06-12 RX ORDER — FUROSEMIDE 10 MG/ML
40 INJECTION INTRAMUSCULAR; INTRAVENOUS 2 TIMES DAILY
Status: DISCONTINUED | OUTPATIENT
Start: 2021-06-12 | End: 2021-06-15 | Stop reason: HOSPADM

## 2021-06-12 RX ORDER — LOSARTAN POTASSIUM 25 MG/1
25 TABLET ORAL DAILY
Status: DISCONTINUED | OUTPATIENT
Start: 2021-06-12 | End: 2021-06-15 | Stop reason: HOSPADM

## 2021-06-12 RX ORDER — ATORVASTATIN CALCIUM 10 MG/1
10 TABLET, FILM COATED ORAL NIGHTLY
Status: DISCONTINUED | OUTPATIENT
Start: 2021-06-12 | End: 2021-06-15 | Stop reason: HOSPADM

## 2021-06-12 RX ORDER — IPRATROPIUM BROMIDE AND ALBUTEROL SULFATE 2.5; .5 MG/3ML; MG/3ML
1 SOLUTION RESPIRATORY (INHALATION)
Status: DISCONTINUED | OUTPATIENT
Start: 2021-06-12 | End: 2021-06-12

## 2021-06-12 RX ADMIN — FUROSEMIDE 40 MG: 10 INJECTION, SOLUTION INTRAMUSCULAR; INTRAVENOUS at 18:34

## 2021-06-12 RX ADMIN — IPRATROPIUM BROMIDE AND ALBUTEROL SULFATE 3 ML: .5; 3 SOLUTION RESPIRATORY (INHALATION) at 19:55

## 2021-06-12 RX ADMIN — IPRATROPIUM BROMIDE AND ALBUTEROL SULFATE 3 ML: .5; 3 SOLUTION RESPIRATORY (INHALATION) at 14:01

## 2021-06-12 RX ADMIN — BUPROPION HYDROCHLORIDE 150 MG: 150 TABLET, EXTENDED RELEASE ORAL at 20:49

## 2021-06-12 RX ADMIN — SODIUM CHLORIDE, PRESERVATIVE FREE 10 ML: 5 INJECTION INTRAVENOUS at 20:55

## 2021-06-12 RX ADMIN — LOSARTAN POTASSIUM 25 MG: 25 TABLET, FILM COATED ORAL at 16:57

## 2021-06-12 RX ADMIN — ATORVASTATIN CALCIUM 10 MG: 10 TABLET, FILM COATED ORAL at 20:49

## 2021-06-12 RX ADMIN — ENOXAPARIN SODIUM 40 MG: 40 INJECTION SUBCUTANEOUS at 20:48

## 2021-06-12 RX ADMIN — CARVEDILOL 3.12 MG: 3.12 TABLET, FILM COATED ORAL at 16:57

## 2021-06-12 RX ADMIN — IPRATROPIUM BROMIDE AND ALBUTEROL SULFATE 3 ML: .5; 3 SOLUTION RESPIRATORY (INHALATION) at 07:03

## 2021-06-12 ASSESSMENT — PAIN SCALES - GENERAL
PAINLEVEL_OUTOF10: 0

## 2021-06-12 ASSESSMENT — ENCOUNTER SYMPTOMS
NAUSEA: 0
GASTROINTESTINAL NEGATIVE: 1
VOMITING: 0
SHORTNESS OF BREATH: 1
ABDOMINAL PAIN: 0
WHEEZING: 0
ALLERGIC/IMMUNOLOGIC NEGATIVE: 1
EYES NEGATIVE: 1

## 2021-06-12 NOTE — PROGRESS NOTES
ABG DONE AT 0707 6/12/21   ON BIPAP 20/7 50%    PH 7.31  PCO2 101  PO2 71  HCO3 51  SAT 90.4%  DR HERNANDEZ NOTIFIED

## 2021-06-12 NOTE — CARE COORDINATION
MET WITH PATIENT. SHE IS HAVING RESPIRATORY DIFFICULTY AND IS ON BIPAP. IT IS NOT INDICATIVE TO COMMUNICATE DISCHARGE PLANNING AT THIS TIME. IMM REVIEWED AND PLACED IN CHART. PT GIVES PERMISSION TO CALL HOUSE PHONE AND SPEAK WITH FAMILY MEMBERS.

## 2021-06-12 NOTE — ACP (ADVANCE CARE PLANNING)
Advance Care Planning     Advance Care Planning Activator (Inpatient)  Conversation Note      Date of ACP Conversation: 6/11/2021     Conversation Conducted with: Patient with Decision Making Capacity    ACP Activator: Rickey Kruger    Pt is on bipap and verbal communication is limited. She states that decisions regarding cpr/vent if her condition worsened \"would be up to my kids\" I gave her information on advance directives. Care Preferences    Ventilation: \"If you were in your present state of health and suddenly became very ill and were unable to breathe on your own, what would your preference be about the use of a ventilator (breathing machine) if it were available to you? \"      Would the patient desire the use of ventilator (breathing machine)?: yes    \"If your health worsens and it becomes clear that your chance of recovery is unlikely, what would your preference be about the use of a ventilator (breathing machine) if it were available to you? \"     Would the patient desire the use of ventilator (breathing machine)? : this would change her opinion. Resuscitation  \"CPR works best to restart the heart when there is a sudden event, like a heart attack, in someone who is otherwise healthy. Unfortunately, CPR does not typically restart the heart for people who have serious health conditions or who are very sick. \"    \"In the event your heart stopped as a result of an underlying serious health condition, would you want attempts to be made to restart your heart (answer \"yes\" for attempt to resuscitate) or would you prefer a natural death (answer \"no\" for do not attempt to resuscitate)? \" yes       [x] Yes   [] No   Educated Patient / Yovani Low regarding differences between Advance Directives and portable DNR orders.     Length of ACP Conversation in minutes:  10  Conversation Outcomes:  [x] ACP discussion completed  [] Existing advance directive reviewed with patient; no changes to patient's previously recorded wishes  [] New Advance Directive completed  [] Portable Do Not Rescitate prepared for Provider review and signature  [] POLST/POST/MOLST/MOST prepared for Provider review and signature      Follow-up plan:  10[] Schedule follow-up conversation to continue planning  [] Referred individual to Provider for additional questions/concerns   [] Advised patient/agent/surrogate to review completed ACP document and update if needed with changes in condition, patient preferences or care setting    [x] This note routed to one or more involved healthcare providers

## 2021-06-12 NOTE — PROGRESS NOTES
Labs     06/11/21  1615   AST 14   ALT 13   BILITOT 0.6   ALKPHOS 95       Current Facility-Administered Medications   Medication Dose Route Frequency Provider Last Rate Last Admin    furosemide (LASIX) injection 40 mg  40 mg Intravenous BID Pitney Braxton, DO        losartan (COZAAR) tablet 25 mg  25 mg Oral Daily Pitney Braxton, DO        carvedilol (COREG) tablet 3.125 mg  3.125 mg Oral BID  Pitney Braxton, DO        buPROPion (WELLBUTRIN XL) extended release tablet 150 mg  150 mg Oral Daily Pitney Braxton, DO        atorvastatin (LIPITOR) tablet 10 mg  10 mg Oral Nightly Pitney Braxton, DO        sodium chloride flush 0.9 % injection 10 mL  10 mL Intravenous 2 times per day Render Points, APRN - NP   10 mL at 06/11/21 2110    sodium chloride flush 0.9 % injection 10 mL  10 mL Intravenous PRN Render Points, APRN - NP        0.9 % sodium chloride infusion  25 mL Intravenous PRN Render Points, APRN - NP        enoxaparin (LOVENOX) injection 40 mg  40 mg Subcutaneous Daily Render Points, APRN - NP   40 mg at 06/11/21 2110    ondansetron (ZOFRAN-ODT) disintegrating tablet 4 mg  4 mg Oral Q8H PRN Render Points, APRN - NP        Or    ondansetron (ZOFRAN) injection 4 mg  4 mg Intravenous Q6H PRN Render Points, APRN - NP        polyethylene glycol (GLYCOLAX) packet 17 g  17 g Oral Daily PRN Render Points, APRN - NP        acetaminophen (TYLENOL) tablet 650 mg  650 mg Oral Q6H PRN Render Points, APRN - NP        Or   Adriel Dillard acetaminophen (TYLENOL) suppository 650 mg  650 mg Rectal Q6H PRN Render Points, APRN - NP        insulin lispro (HUMALOG) injection vial 0-12 Units  0-12 Units Subcutaneous TID WC Render Points, APRN - NP        insulin lispro (HUMALOG) injection vial 0-6 Units  0-6 Units Subcutaneous Nightly Render Points, APRN - NP        glucose (GLUTOSE) 40 % oral gel 15 g  15 g Oral PRN Render Points, APRN - NP        dextrose 50 % IV solution  12.5 g Intravenous PRN Tama Landau, APRN - NP        glucagon (rDNA) injection 1 mg  1 mg Intramuscular PRN Tama Landau, APRN - NP        dextrose 5 % solution  100 mL/hr Intravenous PRN Tama Landau, APRN - NP        senna (SENOKOT) tablet 8.6 mg  1 tablet Oral Daily PRN Tama Landau, APRN - NP        ipratropium-albuterol (DUONEB) nebulizer solution 3 mL  1 vial Inhalation TID [address], DO   3 mL at 06/12/21 1401    albuterol (PROVENTIL) nebulizer solution 2.5 mg  2.5 mg Nebulization Q2H PRN [address], DO           Additional work up or/and treatment plan may be added today or then after based on clinical progression. I am managing a portion of pt care. Some medical issues are handled by other specialists. Additional work up and treatment should be done in out pt setting by pt PCP and other out pt providers. In addition to examining and evaluating pt, I spent additional time explaining care, normaland abnormal findings, and treatment plan. All of pt questions were answered. Counseling, diet and education were provided. Case will be discussed with nursing staff when appropriate. Family will be updated if and when appropriate.        Electronically signed by [address], DO on 6/12/2021 at 3:12 PM

## 2021-06-12 NOTE — PROGRESS NOTES
Repeat ABG improving. Mental status improving  7.34/94/80 with 94% sat on BIPAP. Ok for dinner break then go back on BIPAP.

## 2021-06-12 NOTE — CONSULTS
Chief Complaint   Patient presents with    Shortness of Breath       6-12-21: Patient is a 55 y.o. female who presents with a chief complaint of SOB. Patient is followed on a regular basis by Dr. Catalino Vail PA-C. Patient with multiple medical problems including chronic diastolic congestive heart failure, COPD, hypertension, hyperlipidemia, morbid obesity, likely KARISSA. Presented with worsening shortness of breath as well as increased lower extremity edema. Patient last seen in our office in February 2020 where she was supposed to undergo nuclear stress as well as echocardiogram but failed to do so. Patient does have a history of noncompliance. She denies any chest pain chest pressure heaviness. Pulmonary following for acute exacerbation COPD. Patient is currently on BiPAP. Initial cardiac enzyme is negative, BNP is elevated at thousand 88. She denies any history of myocardial infarction or arrhythmia. Denies history of stress test or cardiac catheterization. EKG with normal sinus rhythm, poor R wave progression across the cardial leads, T wave inversion in inferior leads.     Past Medical History:   Diagnosis Date    Anxiety     Asthma     CHF (congestive heart failure) (HCC)     Chronic back pain     COPD (chronic obstructive pulmonary disease) (HCC)     Depression     Hypertension     Obesity     Pelvic pain in female 2/15/2018    Type 2 diabetes mellitus without complication ([facility] [address] 4/4/6939      Patient Active Problem List   Diagnosis    Bilateral leg edema    KARISSA (obstructive sleep apnea)    SOB (shortness of breath)    Tobacco abuse    Tobacco abuse counseling    BMI 50.0-59.9, adult ([facility] [address]    LVH (left ventricular hypertrophy)    Diastolic dysfunction    Essential hypertension    Coronary artery disease involving native coronary artery of native heart    Weight gain    Vitamin D deficiency    Depressed mood    Osteoarthritis of knee    Malaise and fatigue    Hypertensive heart disease with heart failure (HCC)    Generalized anxiety disorder    Gastro-esophageal reflux disease without esophagitis    Chronic obstructive pulmonary disease (HCC)    Cervical disc disorder    Hypoxia    Intertrigo    Dental caries    Urge incontinence of urine    Dysmetabolic syndrome    Prediabetes     Cellulitis of both lower extremities       Past Surgical History:   Procedure Laterality Date    BLADDER SUSPENSION  02/2014    CARPAL TUNNEL RELEASE  89 or 90    right hand     HYSTERECTOMY  2009    full    [place] CMBND ANTERPOST COLPORRAPHY W/CYSTO W/NTRCL RPR N/A 2/19/2018    EXCISION OF VAGINAL MESH, CYSTOSCOPY performed by Tri Nieves DO at [facility]      27 yrs ago       Social History     Socioeconomic History    Marital status:      Spouse name: None    Number of children: None    Years of education: None    Highest education level: None   Occupational History    None   Tobacco Use    Smoking status: Current Every Day Smoker     Packs/day: 1.00     Types: Cigarettes     Start date: 5/6/1979    Smokeless tobacco: Never Used   Substance and Sexual Activity    Alcohol use: No    Drug use: No    Sexual activity: Yes     Partners: Male   Other Topics Concern    None   Social History Narrative    None     Social Determinants of Health     Financial Resource Strain: Low Risk     Difficulty of Paying Living Expenses: Not hard at all   Food Insecurity:     Worried About [address] in the Last Year:     [facility] in the Last Year:    Transportation Needs:     Lack of Transportation (Medical):      Lack of Transportation (Non-Medical):    Physical Activity:     Days of Exercise per Week:     Minutes of Exercise per Session:    Stress:     Feeling of Stress :    Social Connections:     Frequency of Communication with Friends and Family:     Frequency of Social Gatherings with Friends and Family:     Attends Yarsani Services:     Active Member of Clubs or Organizations:     Attends Club or Organization Meetings:     Marital Status:    Intimate Partner Violence:     Fear of Current or Ex-Partner:     Emotionally Abused:     Physically Abused:     Sexually Abused:        Family History   Problem Relation Age of Onset    Alzheimer's Disease Father     High Blood Pressure Father     Stroke Father         11 strokes in one year    Asthma Sister     Asthma Brother     No Known Problems Daughter     No Known Problems Daughter        Current Facility-Administered Medications   Medication Dose Route Frequency Provider Last Rate Last Admin    furosemide (LASIX) injection 40 mg  40 mg Intravenous BID Leila Aly, DO        losartan (COZAAR) tablet 25 mg  25 mg Oral Daily Leila Jermain, DO        carvedilol (COREG) tablet 3.125 mg  3.125 mg Oral BID WC Leila Aly, DO        ipratropium-albuterol (DUONEB) nebulizer solution 1 ampule  1 ampule Inhalation Q4H GONSALO Peace,         buPROPion (WELLBUTRIN XL) extended release tablet 150 mg  150 mg Oral Daily Leila Jermain, DO        atorvastatin (LIPITOR) tablet 10 mg  10 mg Oral Nightly Leila Aly, DO        sodium chloride flush 0.9 % injection 10 mL  10 mL Intravenous 2 times per day Christine Cleaves, APRN - NP   10 mL at 06/11/21 2110    sodium chloride flush 0.9 % injection 10 mL  10 mL Intravenous PRN Christine Cleaves, APRN - NP        0.9 % sodium chloride infusion  25 mL Intravenous PRN Christine Cleaves, APRN - NP        enoxaparin (LOVENOX) injection 40 mg  40 mg Subcutaneous Daily Christine Cleaves, APRN - NP   40 mg at 06/11/21 2110    ondansetron (ZOFRAN-ODT) disintegrating tablet 4 mg  4 mg Oral Q8H PRN Christine Cleaves, APRN - NP        Or    ondansetron (ZOFRAN) injection 4 mg  4 mg Intravenous Q6H PRN Christine Cleaves, APRN - NP        polyethylene glycol (GLYCOLAX) packet 17 g  17 g Oral Daily PRN Christine Cleaves, APRN - NP        acetaminophen She is well-developed. She is obese. She is not diaphoretic. HENT:      Head: Normocephalic and atraumatic. Eyes:      Pupils: Pupils are equal, round, and reactive to light. Neck:      Thyroid: No thyromegaly. Vascular: No JVD. Trachea: No tracheal deviation. Cardiovascular:      Rate and Rhythm: Normal rate and regular rhythm. Chest Wall: PMI is not displaced. Pulses: Intact distal pulses. Heart sounds: Normal heart sounds. Heart sounds not distant. No murmur heard. No friction rub. No gallop. No S3 sounds. Pulmonary:      Effort: No respiratory distress. Breath sounds: No wheezing or rales. Chest:      Chest wall: No tenderness. Abdominal:      General: Bowel sounds are normal. There is no distension. Palpations: Abdomen is soft. There is no mass. Tenderness: There is no abdominal tenderness. There is no guarding or rebound. Musculoskeletal:         General: Swelling present. Cervical back: Normal range of motion and neck supple. Skin:     General: Skin is warm and dry. Coloration: Skin is not pale. Findings: No erythema or rash. Neurological:      Mental Status: She is alert and oriented to person, place, and time. Cranial Nerves: No cranial nerve deficit. Psychiatric:         Behavior: Behavior normal.         Thought Content:  Thought content normal.         Judgment: Judgment normal.         LABS:  Recent Results (from the past 24 hour(s))   CBC Auto Differential    Collection Time: 06/11/21  4:15 PM   Result Value Ref Range    WBC 8.0 4.8 - 10.8 K/uL    RBC 5.15 4.20 - 5.40 M/uL    Hemoglobin 16.5 (H) 12.0 - 16.0 g/dL    Hematocrit 51.3 (H) 37.0 - 47.0 %    MCV 99.7 82.0 - 100.0 fL    MCH 32.1 (H) 27.0 - 31.3 pg    MCHC 32.2 (L) 33.0 - 37.0 %    RDW 17.0 (H) 11.5 - 14.5 %    Platelets 527 088 - 886 K/uL    Neutrophils % 77.0 %    Lymphocytes % 15.5 %    Monocytes % 6.5 %    Eosinophils % 0.5 %    Basophils % 0.5 % Neutrophils Absolute 6.1 1.4 - 6.5 K/uL    Lymphocytes Absolute 1.2 1.0 - 4.8 K/uL    Monocytes Absolute 0.5 0.2 - 0.8 K/uL    Eosinophils Absolute 0.0 0.0 - 0.7 K/uL    Basophils Absolute 0.0 0.0 - 0.2 K/uL   Comprehensive Metabolic Panel    Collection Time: 06/11/21  4:15 PM   Result Value Ref Range    Sodium 138 135 - 144 mEq/L    Potassium 4.9 3.4 - 4.9 mEq/L    Chloride 94 (L) 95 - 107 mEq/L    CO2 41 (HH) 20 - 31 mEq/L    Anion Gap 3 (L) 9 - 15 mEq/L    Glucose 98 70 - 99 mg/dL    BUN 10 6 - 20 mg/dL    CREATININE 0.92 (H) 0.50 - 0.90 mg/dL    GFR Non-African American >60.0 >60    GFR  >60.0 >60    Calcium 9.4 8.5 - 9.9 mg/dL    Total Protein 7.1 6.3 - 8.0 g/dL    Albumin 3.8 3.5 - 4.6 g/dL    Total Bilirubin 0.6 0.2 - 0.7 mg/dL    Alkaline Phosphatase 95 40 - 130 U/L    ALT 13 0 - 33 U/L    AST 14 0 - 35 U/L    Globulin 3.3 2.3 - 3.5 g/dL   Troponin    Collection Time: 06/11/21  4:15 PM   Result Value Ref Range    Troponin <0.010 0.000 - 0.010 ng/mL   Brain Natriuretic Peptide    Collection Time: 06/11/21  4:15 PM   Result Value Ref Range    Pro-BNP 1,088 pg/mL   Protime-INR    Collection Time: 06/11/21  4:15 PM   Result Value Ref Range    Protime 14.2 12.3 - 14.9 sec    INR 1.1    POCT Arterial    Collection Time: 06/11/21  4:15 PM   Result Value Ref Range    POC Sodium 142 136 - 145 mEq/L    POC Potassium 4.5 3.5 - 5.1 mEq/L    POC Chloride 94 (L) 99 - 110 mEq/L    POC Glucose 103 60 - 115 mg/dl    POC Creatinine 1.0 0.6 - 1.1 mg/dL    GFR Non-African American 58 (A) >60    GFR African American >60 >60    Calcium, Ion 1.22 1.12 - 1.32 mmol/L    pH, Arterial 7.319 (L) 7.350 - 7.450    pCO2, Arterial 82 (HH) 35 - 45 mm Hg    pO2, Arterial 54 (HH) 75 - 108 mm Hg    HCO3, Arterial 42.3 (H) 21.0 - 29.0 mmol/L    Base Excess, Arterial 16 (H) -3 - 3    O2 Sat, Arterial 82 (LL) 93 - 100 %    TCO2, Arterial 45 (H) 22 - 29    Lactate 1.13 0.40 - 2.00 mmol/L    POC Hematocrit 57 (H) 36 - 48 % Hemoglobin 19.4 (H) 12.0 - 16.0 gm/dL    FIO2 3.000     Sample Type ART     Performed on SEE BELOW    D-Dimer, Quantitative    Collection Time: 06/11/21  4:15 PM   Result Value Ref Range    D-Dimer, Quant 0.69 (HH) 0.00 - 0.50 mg/L FEU   Hemoglobin A1C    Collection Time: 06/11/21  4:15 PM   Result Value Ref Range    Hemoglobin A1C 5.5 4.8 - 5.9 %   EKG 12 Lead - Chest Pain    Collection Time: 06/11/21  4:15 PM   Result Value Ref Range    Ventricular Rate 89 BPM    Atrial Rate 89 BPM    P-R Interval 144 ms    QRS Duration 76 ms    Q-T Interval 374 ms    QTc Calculation (Bazett) 455 ms    P Axis 43 degrees    R Axis -3 degrees    T Axis -7 degrees   POCT Glucose    Collection Time: 06/11/21  7:44 PM   Result Value Ref Range    POC Glucose 94 60 - 115 mg/dl    Performed on ACCU-CHEK    POCT Arterial    Collection Time: 06/12/21  5:52 AM   Result Value Ref Range    POC Sodium 145 136 - 145 mEq/L    POC Potassium 4.5 3.5 - 5.1 mEq/L    POC Chloride 88 (L) 99 - 110 mEq/L    POC Glucose 110 60 - 115 mg/dl    POC Creatinine 1.1 0.6 - 1.1 mg/dL    GFR Non-African American 52 (A) >60    GFR African American >60 >60    Calcium, Ion 1.22 1.12 - 1.32 mmol/L    pH, Arterial 7.270 (L) 7.350 - 7.450    pCO2, Arterial 113 (HH) 35 - 45 mm Hg    pO2, Arterial 63 (HH) 75 - 108 mm Hg    HCO3, Arterial 52.1 (H) 21.0 - 29.0 mmol/L    Base Excess, Arterial 25 (H) -3 - 3    O2 Sat, Arterial 85 (LL) 93 - 100 %    TCO2, Arterial >50 (H) 22 - 29    Lactate 0.58 0.40 - 2.00 mmol/L    POC Hematocrit 57 (H) 36 - 48 %    Hemoglobin 19.3 (H) 12.0 - 16.0 gm/dL    FIO2 50.000     Sample Type ART     Performed on SEE BELOW    POCT Arterial    Collection Time: 06/12/21  6:06 AM   Result Value Ref Range    POC Sodium 145 136 - 145 mEq/L    POC Potassium 4.2 3.5 - 5.1 mEq/L    POC Chloride 89 (L) 99 - 110 mEq/L    POC Glucose 116 (H) 60 - 115 mg/dl    POC Creatinine 1.2 (H) 0.6 - 1.1 mg/dL    GFR Non- 47 (A) >60    GFR  56 (A) >60    Calcium, Ion 1.23 1.12 - 1.32 mmol/L    pH, Arterial 7.289 (L) 7.350 - 7.450    pCO2, Arterial 108 (HH) 35 - 45 mm Hg    pO2, Arterial 67 (HH) 75 - 108 mm Hg    HCO3, Arterial 51.8 (H) 21.0 - 29.0 mmol/L    Base Excess, Arterial 25 (H) -3 - 3    O2 Sat, Arterial 88 (LL) 93 - 100 %    TCO2, Arterial >50 (H) 22 - 29    Lactate 0.57 0.40 - 2.00 mmol/L    POC Hematocrit 59 (H) 36 - 48 %    Hemoglobin 20.1 (HH) 12.0 - 16.0 gm/dL    FIO2 50.000     Sample Type ART     Performed on SEE BELOW    POCT Arterial    Collection Time: 06/12/21  6:20 AM   Result Value Ref Range    POC Sodium 146 (H) 136 - 145 mEq/L    POC Potassium 4.2 3.5 - 5.1 mEq/L    POC Chloride 89 (L) 99 - 110 mEq/L    POC Glucose 118 (H) 60 - 115 mg/dl    POC Creatinine 1.3 (H) 0.6 - 1.1 mg/dL    GFR Non- 43 (A) >60    GFR  51 (A) >60    Calcium, Ion 1.16 1.12 - 1.32 mmol/L    pH, Arterial 7.300 (L) 7.350 - 7.450    pCO2, Arterial 114 (HH) 35 - 45 mm Hg    pO2, Arterial 71 (HH) 75 - 108 mm Hg    HCO3, Arterial 56.3 (H) 21.0 - 29.0 mmol/L    Base Excess, Arterial 30 (H) -3 - 3    O2 Sat, Arterial 90 (HH) 93 - 100 %    TCO2, Arterial >50 (H) 22 - 29    Lactate 0.55 0.40 - 2.00 mmol/L    POC Hematocrit 59 (H) 36 - 48 %    Hemoglobin 20.0 (H) 12.0 - 16.0 gm/dL    FIO2 50.000     Sample Type ART     Performed on SEE BELOW    POCT Glucose    Collection Time: 06/12/21 11:05 AM   Result Value Ref Range    POC Glucose 102 60 - 115 mg/dl    Performed on ACCU-CHEK      Troponin:   Lab Results   Component Value Date    TROPONINI <0.010 06/11/2021       EKG: normal sinus rhythm, inferior leads T wave inversion. Poor R wave progression across the precordial leads      ASSESSMENT:    Shortness of breath-multifactorial secondary to acute on chronic decompensated diastolic congestive heart failure, acute exacerbation COPD, obesity hypopnea syndrome, pulmonary hypertension, obstructive sleep apnea.     Abnormal EKG    Likely KARISSA    Morbid obesity    History of medical noncompliance    Essential hypertension    Mixed hyperlipidemia    Multiple medical problems      PLAN:   1. As always, aggressive risk factor modification is strongly recommended. We should adhere to the JNC VIII guidelines for HTN management and the NCEPATP III guidelines for LDL-C management. 2. Await 2D echocardiogram  3. Coronary evaluation when clinically stable. Likely as an inpatient. Patient with multiple risk factors for CAD, abnormal EKG, recurrent congestive heart failure symptoms. She is a poor stress test candidate secondary to body habitus. Would recommend undergoing cardiac catheterization to delineate her craniotomy and obtain definitive diagnosis. 4. Maximize cardiac medications  5. IV diuretics as tolerated. Monitor I's and O's and renal function electrolytes. Keep potassium greater than 4, magnesium greater than 2  6. Continue to monitor on telemetry  7. CHF teaching  8. CHF clinic follow-up post discharge. Patient with high risk of readmission  9. Pulmonary recommendations  10. Weight loss recommended      Thank you for allowing me to participate in the care of your patient, please don't hesitate to contact me if you have any further questions.     Electronically signed by Danelle Moeller DO on 6/12/2021 at 2:13 PM

## 2021-06-12 NOTE — PROGRESS NOTES
0900 Patient on BiPap, SpO2 stable. Eyes closed, awakens with verbal stimuli but she is lethargic and quickly falls back to sleep. Denies needs. 1200 Removed BiPap for lunch per Dr. Dl Torres. Placed on 5L NC, SpO2 91%. Set up with lunch tray, denies further needs. Will closely monitor O2 needs. 1300 Patient back on BiPap. 410 [facility] patient's significant other [place] to update him on patient's condition since patient cannot talk on the phone at this time. Gave him my phone number for any concerns today. Also called patient's pharmacy to verify home meds, notified Dr. Catherine Walton med list is complete. 1700 Assisted patient up to chair for dinner. . on 4.5L NC SpO2 remains stable. Incontinent of urine- bed change complete.

## 2021-06-12 NOTE — CONSULTS
Pulmonary and Critical Care Medicine  Consult Note  Encounter Date: 2021 11:54 AM    Ms. Don Perez is a 55 y.o. female  : 1965  Requesting Provider: LATISHA Sandhu   Reason for request: KARISSA, hypoxia/hypercapnea          HISTORY OF PRESENT ILLNESS:    Patient is 55 y.o. presents to the emergency department at the urging of her primary care provider. She was found to be hypoxic when she went to her PCPs office. She does have a known history of chronic hypoxic respiratory failure and likely a diagnosis of KARISSA. She states she did have a CPAP or BiPAP machine in the past, however when she moved she no longer had the machine available. She has been using her oxygen routinely. She does note that her oxygen saturations do decrease when she has been up moving around. She states this has been occurring over the last several weeks. She is also noticed some increased fatigue recently. She reportedly has been sleeping quite a bit during the day. She states she is scheduled to see cardiology as an outpatient, however is yet to see them. Her last echocardiogram was over 10 years ago. However it does appear that one was completed here this morning. She did have evidence of acute on chronic hypercapnia. She was placed on BiPAP this morning. She currently is resting comfortably with BiPAP in place. Her lunch was delivered when I was seeing her and she did request to come off so that she could eat. She denies any complaints of shortness of breath currently while on BiPAP. She does have lower extremity edema and states her legs do hurt. She denies any other changes to medications.           Past Medical History:        Diagnosis Date    Anxiety     Asthma     [facility]     Chronic back pain     COPD [facility]ch[facility] obstructive pulmonary disease) [facility]     Depression     Hypertension     Obesity     Pelvic pain in female 2/15/2018    Type 2 diabetes mellitus without 98.4 °F (36.9 °C) (Oral)   Resp 22   Ht 5' 5\" (1.651 m)   Wt (!) 350 lb (158.8 kg)   LMP  (LMP Unknown)   SpO2 94%   BMI 58.24 kg/m²     General: No acute distress, resting comfortably in bed, fullface BiPAP in place  HEENT: Normocephalic, atraumatic, fullface BiPAP in place  Chest : Diminished but clear, no wheezes, no rales, no rhonchi  Heart[de-identified] Regular rate and rhythm  ABD: Obese, positive bowel sounds, soft, nontender to palpation  Extremities : Warm, dry, edema noted, chronic venous stasis changes noted  Neuro: Awake, alert, oriented x4, moves all 4 extremities  Skin: No rashes, chronic venous stasis changes noted    Data Review  Recent Labs     06/11/21 1615 06/12/21  0552 06/12/21 0606 06/12/21 0620   WBC 8.0  --   --   --    HGB 16.5*  19.4* 19.3* 20.1* 20.0*   HCT 51.3*  --   --   --      --   --   --       Recent Labs     06/11/21  1615 06/12/21  0552 06/12/21 0606 06/12/21  0620     --   --   --    K 4.9  --   --   --    CL 94*  --   --   --    CO2 41*  --   --   --    BUN 10  --   --   --    CREATININE 0.92*  1.0 1.1 1.2* 1.3*   GLUCOSE 98  --   --   --        ABGs:   Recent Labs     06/12/21  0552 06/12/21 0606 06/12/21 0620   PHART 7.270* 7.289* 7.300*   ZCK2UMX 113* 108* 114*   PO2ART 63* 67* 71*   YFB2ZAB 52.1* 51.8* 56.3*   BEART 25* 25* 30*   J3EBNCIF 85* 88* 90*   FIN2WEH >50* >50* >50*     O2 Device: PAP (positive airway pressure)  O2 Flow Rate (L/min): 3 L/min  No results found for: LACTA    Radiology:    Report of CT head from 6/11 reviewed--no acute intracranial process    Images and report of CT chest from 6/11 reviewed--negative CTA of the chest, no evidence of thoracic aortic dissection or aneurysm, negative for pulmonary embolism, no acute infiltrate or effusion, areas of increased linear interstitial markings in both lung bases which may represent residual scarring from prior infection/inflammation    Assessment/Plan:       1.  Acute on chronic hypoxic and hypercapnic respiratory failure--patient did have evidence of acute on chronic hypercapnia on her blood gases this morning. She does have a baseline PCO2 likely around 80. At this time we did have a discussion regarding the possibility of sleep apnea. She states she did have a CPAP or BiPAP at home at one point, however lost machine when she moved. Patient does need to be reevaluated for BiPAP. She would benefit from an outpatient split-night sleep study to diagnose the severity of her sleep apnea as well as establish requirements of her pressure settings. At this time I do agree with diuresis efforts. She did receive Lasix in the emergency department. An echocardiogram has been completed. Her last echo was in 2010 that I can review from our system. There is no evidence of pulmonary hypertension at that point, however right-sided pressures were difficult to determine. Repeat ABG if the patient does have any clinical worsening of her wakefulness or respiratory status. I did discuss the case with the patient's bedside nurse as well as respiratory therapy that she may have breaks to eat, however does need to spend the majority the day on BiPAP. 2. Probable KARISSA --outpatient split-night sleep study to determine the severity of her KARISSA and pressure requirements. 3. Polycythemia--this is secondary to her chronic hypoxia. This should improve as her chronic hypoxia improves.     Thank you for consultation    Electronically signed by Christy Silva DO, on 6/12/2021 at 11:54 AM

## 2021-06-12 NOTE — PROGRESS NOTES
[facility] Respiratory Therapy Evaluation   Current Order:  duoneb q4 prn      Home Regimen:  Alb prn     Ordering Physician: traci  Re-evaluation Date:  6/14     Diagnosis: hypoxia     Patient Status: Stable / Unstable + Physician notified    The following MDI Criteria must be met in order to convert aerosol to MDI with spacer. If unable to meet, MDI will be converted to aerosol:  []  Patient able to demonstrate the ability to use MDI effectively  []  Patient alert and cooperative  []  Patient able to take deep breath with 5-10 second hold  []  Medication(s) available in this delivery method   []  Peak flow greater than or equal to 200 ml/min            Current Order Substituted To  (same drug, same frequency)   Aerosol to MDI [] Albuterol Sulfate 0.083% unit dose by aerosol Albuterol Sulfate MDI 2 puffs by inhalation with spacer    [] Levalbuterol 1.25 mg unit dose by aerosol Levalbuterol MDI 2 puffs by inhalation with spacer    [] Levalbuterol 0.63 mg unit dose by aerosol Levalbuterol MDI 2 puffs by inhalation with spacer    [] Ipratropium Bromide 0.02% unit dose by aerosol Ipratropium Bromide MDI 2 puffs by inhalation with spacer    [] Duoneb (Ipratropium + Albuterol) unit dose by aerosol Ipratropium MDI + Albuterol MDI 2 puffs by inhalation w/spacer   MDI to Aerosol [] Albuterol Sulfate MDI Albuterol Sulfate 0.083% unit dose by aerosol    [] Levalbuterol MDI 2 puffs by inhalation Levalbuterol 1.25 mg unit dose by aerosol    [] Ipratropium Bromide MDI by inhalation Ipratropium Bromide 0.02% unit dose by aerosol    [] Combivent (Ipratropium + Albuterol) MDI by inhalation Duoneb (Ipratropium + Albuterol) unit dose by aerosol       Treatment Assessment [Frequency/Schedule]:  Change frequency to: ________________________tid + q2 __________________________per Protocol, P&T, MEC      Points 0 1 2 3 4   Pulmonary Status  Non-Smoker  []   Smoking history   < 20 pack years  []   Smoking history  ?  20 pack years  [] Pulmonary Disorder  (acute or chronic)  [x]   Severe or Chronic w/ Exacerbation  []     Surgical Status No [x]   Surgeries     General []   Surgery Lower []   Abdominal Thoracic or []   Upper Abdominal Thoracic with  PulmonaryDisorder  []     Chest X-ray Clear/Not  Ordered     []  Chronic Changes  Results Pending  []  Infiltrates, atelectasis, pleural effusion, or edema  [x]  Infiltrates in more than one lobe []  Infiltrate + Atelectasis, &/or pleural effusion  []    Respiratory Pattern Regular,  RR = 12-20 [x]  Increased,  RR = 21-25 []  WHITE, irregular,  or RR = 26-30 []  Decreased FEV1  or RR = 31-35 []  Severe SOB, use  of accessory muscles, or RR ? 35  []    Mental Status Alert, oriented,  Cooperative [x]  Confused but Follows commands []  Lethargic or unable to follow commands []  Obtunded  []  Comatose  []    Breath Sounds Clear to  auscultation  []  Decreased unilaterally or  in bases only [x]  Decreased  bilaterally  []  Crackles or intermittent wheezes []  Wheezes []    Cough Strong, Spontan., & nonproductive [x]  Strong,  spontaneous, &  productive []  Weak,  Nonproductive []  Weak, productive or  with wheezes []  No spontaneous  cough or may require suctioning []    Level of Activity Ambulatory [x]  Ambulatory w/ Assist  []  Non-ambulatory []  Paraplegic []  Quadriplegic []    Total    Score:___6____     Triage Score:__4______      Tri       Triage:     1. (>20) Freq: Q3    2. (16-20) Freq: Q4   3. (11-15) Freq: QID & Albuterol Q2 PRN    4. (6-10) Freq: TID & Albuterol Q2 PRN    5. (0-5) Freq Q4prn

## 2021-06-13 LAB
ANION GAP SERPL CALCULATED.3IONS-SCNC: 4 MEQ/L (ref 9–15)
BASOPHILS ABSOLUTE: 0 K/UL (ref 0–0.2)
BASOPHILS RELATIVE PERCENT: 0.4 %
BUN BLDV-MCNC: 13 MG/DL (ref 6–20)
CALCIUM SERPL-MCNC: 9.2 MG/DL (ref 8.5–9.9)
CHLORIDE BLD-SCNC: 90 MEQ/L (ref 95–107)
CO2: 45 MEQ/L (ref 20–31)
CREAT SERPL-MCNC: 0.89 MG/DL (ref 0.5–0.9)
EOSINOPHILS ABSOLUTE: 0.1 K/UL (ref 0–0.7)
EOSINOPHILS RELATIVE PERCENT: 0.8 %
GFR AFRICAN AMERICAN: >60
GFR NON-AFRICAN AMERICAN: >60
GLUCOSE BLD-MCNC: 104 MG/DL (ref 60–115)
GLUCOSE BLD-MCNC: 118 MG/DL (ref 70–99)
GLUCOSE BLD-MCNC: 168 MG/DL (ref 60–115)
GLUCOSE BLD-MCNC: 251 MG/DL (ref 60–115)
GLUCOSE BLD-MCNC: 83 MG/DL (ref 60–115)
HCT VFR BLD CALC: 50.8 % (ref 37–47)
HEMOGLOBIN: 16.3 G/DL (ref 12–16)
LYMPHOCYTES ABSOLUTE: 1.1 K/UL (ref 1–4.8)
LYMPHOCYTES RELATIVE PERCENT: 13.4 %
MCH RBC QN AUTO: 31.7 PG (ref 27–31.3)
MCHC RBC AUTO-ENTMCNC: 32 % (ref 33–37)
MCV RBC AUTO: 99.1 FL (ref 82–100)
MONOCYTES ABSOLUTE: 0.6 K/UL (ref 0.2–0.8)
MONOCYTES RELATIVE PERCENT: 7.2 %
NEUTROPHILS ABSOLUTE: 6.3 K/UL (ref 1.4–6.5)
NEUTROPHILS RELATIVE PERCENT: 78.2 %
PDW BLD-RTO: 17 % (ref 11.5–14.5)
PERFORMED ON: ABNORMAL
PERFORMED ON: ABNORMAL
PERFORMED ON: NORMAL
PERFORMED ON: NORMAL
PLATELET # BLD: 203 K/UL (ref 130–400)
POTASSIUM REFLEX MAGNESIUM: 4.4 MEQ/L (ref 3.4–4.9)
RBC # BLD: 5.13 M/UL (ref 4.2–5.4)
SODIUM BLD-SCNC: 139 MEQ/L (ref 135–144)
WBC # BLD: 8 K/UL (ref 4.8–10.8)

## 2021-06-13 PROCEDURE — 80048 BASIC METABOLIC PNL TOTAL CA: CPT

## 2021-06-13 PROCEDURE — 99233 SBSQ HOSP IP/OBS HIGH 50: CPT | Performed by: INTERNAL MEDICINE

## 2021-06-13 PROCEDURE — 6370000000 HC RX 637 (ALT 250 FOR IP): Performed by: INTERNAL MEDICINE

## 2021-06-13 PROCEDURE — 94640 AIRWAY INHALATION TREATMENT: CPT

## 2021-06-13 PROCEDURE — 6360000002 HC RX W HCPCS: Performed by: NURSE PRACTITIONER

## 2021-06-13 PROCEDURE — 2060000000 HC ICU INTERMEDIATE R&B

## 2021-06-13 PROCEDURE — 99232 SBSQ HOSP IP/OBS MODERATE 35: CPT | Performed by: INTERNAL MEDICINE

## 2021-06-13 PROCEDURE — 94761 N-INVAS EAR/PLS OXIMETRY MLT: CPT

## 2021-06-13 PROCEDURE — 85025 COMPLETE CBC W/AUTO DIFF WBC: CPT

## 2021-06-13 PROCEDURE — 2580000003 HC RX 258: Performed by: NURSE PRACTITIONER

## 2021-06-13 PROCEDURE — 6360000002 HC RX W HCPCS: Performed by: INTERNAL MEDICINE

## 2021-06-13 PROCEDURE — 36415 COLL VENOUS BLD VENIPUNCTURE: CPT

## 2021-06-13 PROCEDURE — 2700000000 HC OXYGEN THERAPY PER DAY

## 2021-06-13 PROCEDURE — 94660 CPAP INITIATION&MGMT: CPT

## 2021-06-13 PROCEDURE — 6370000000 HC RX 637 (ALT 250 FOR IP): Performed by: NURSE PRACTITIONER

## 2021-06-13 RX ADMIN — BUPROPION HYDROCHLORIDE 150 MG: 150 TABLET, EXTENDED RELEASE ORAL at 08:36

## 2021-06-13 RX ADMIN — INSULIN LISPRO 2 UNITS: 100 INJECTION, SOLUTION INTRAVENOUS; SUBCUTANEOUS at 17:09

## 2021-06-13 RX ADMIN — IPRATROPIUM BROMIDE AND ALBUTEROL SULFATE 3 ML: .5; 3 SOLUTION RESPIRATORY (INHALATION) at 08:01

## 2021-06-13 RX ADMIN — LOSARTAN POTASSIUM 25 MG: 25 TABLET, FILM COATED ORAL at 08:37

## 2021-06-13 RX ADMIN — FUROSEMIDE 40 MG: 10 INJECTION, SOLUTION INTRAMUSCULAR; INTRAVENOUS at 08:37

## 2021-06-13 RX ADMIN — SODIUM CHLORIDE, PRESERVATIVE FREE 10 ML: 5 INJECTION INTRAVENOUS at 20:33

## 2021-06-13 RX ADMIN — ENOXAPARIN SODIUM 40 MG: 40 INJECTION SUBCUTANEOUS at 08:37

## 2021-06-13 RX ADMIN — SODIUM CHLORIDE, PRESERVATIVE FREE 10 ML: 5 INJECTION INTRAVENOUS at 08:37

## 2021-06-13 RX ADMIN — IPRATROPIUM BROMIDE AND ALBUTEROL SULFATE 3 ML: .5; 3 SOLUTION RESPIRATORY (INHALATION) at 19:12

## 2021-06-13 RX ADMIN — CARVEDILOL 3.12 MG: 3.12 TABLET, FILM COATED ORAL at 17:09

## 2021-06-13 RX ADMIN — CARVEDILOL 3.12 MG: 3.12 TABLET, FILM COATED ORAL at 08:37

## 2021-06-13 RX ADMIN — INSULIN LISPRO 6 UNITS: 100 INJECTION, SOLUTION INTRAVENOUS; SUBCUTANEOUS at 12:00

## 2021-06-13 RX ADMIN — ACETAMINOPHEN 650 MG: 325 TABLET ORAL at 21:06

## 2021-06-13 RX ADMIN — FUROSEMIDE 40 MG: 10 INJECTION, SOLUTION INTRAMUSCULAR; INTRAVENOUS at 17:09

## 2021-06-13 RX ADMIN — IPRATROPIUM BROMIDE AND ALBUTEROL SULFATE 3 ML: .5; 3 SOLUTION RESPIRATORY (INHALATION) at 13:29

## 2021-06-13 RX ADMIN — ATORVASTATIN CALCIUM 10 MG: 10 TABLET, FILM COATED ORAL at 20:33

## 2021-06-13 ASSESSMENT — PAIN SCALES - GENERAL
PAINLEVEL_OUTOF10: 4
PAINLEVEL_OUTOF10: 9
PAINLEVEL_OUTOF10: 0

## 2021-06-13 ASSESSMENT — PAIN DESCRIPTION - DESCRIPTORS: DESCRIPTORS: ACHING

## 2021-06-13 ASSESSMENT — PAIN DESCRIPTION - LOCATION: LOCATION: GENERALIZED

## 2021-06-13 NOTE — PROGRESS NOTES
06/11/21  1615 06/12/21  1546 06/13/21  1249   WBC 8.0  --  8.0   HGB 16.5*  19.4* 19.3* 16.3*     --  203     Recent Labs     06/11/21  1615 06/12/21  1546 06/13/21  1249     --  139   K 4.9  --  4.4   CL 94*  --  90*   CO2 41*  --  45*   BUN 10  --  13   CREATININE 0.92*  1.0 1.1 0.89   GLUCOSE 98  --  118*     Recent Labs     06/11/21  1615   AST 14   ALT 13   BILITOT 0.6   ALKPHOS 95       Current Facility-Administered Medications   Medication Dose Route Frequency Provider Last Rate Last Admin    furosemide (LASIX) injection 40 mg  40 mg Intravenous BID Pitney Braxton, DO   40 mg at 06/13/21 0837    losartan (COZAAR) tablet 25 mg  25 mg Oral Daily Pitney Braxton, DO   25 mg at 06/13/21 0837    carvedilol (COREG) tablet 3.125 mg  3.125 mg Oral BID  Yazid R Kobi, DO   3.125 mg at 06/13/21 2103    buPROPion (WELLBUTRIN XL) extended release tablet 150 mg  150 mg Oral Daily Pitney Braxton, DO   150 mg at 06/13/21 0836    atorvastatin (LIPITOR) tablet 10 mg  10 mg Oral Nightly Pitney Braxton, DO   10 mg at 06/12/21 2049    sodium chloride flush 0.9 % injection 10 mL  10 mL Intravenous 2 times per day YOLANDA Cardona - NP   10 mL at 06/13/21 0837    sodium chloride flush 0.9 % injection 10 mL  10 mL Intravenous PRN Hilary Hawk APRN - NP        0.9 % sodium chloride infusion  25 mL Intravenous PRN Hilary Hawk APRN - NP        enoxaparin (LOVENOX) injection 40 mg  40 mg Subcutaneous Daily Hilary Hawk APRN - NP   40 mg at 06/13/21 0837    ondansetron (ZOFRAN-ODT) disintegrating tablet 4 mg  4 mg Oral Q8H PRN Hilary Hawk APRN - NP        Or    ondansetron (ZOFRAN) injection 4 mg  4 mg Intravenous Q6H PRN Hilary Hawk APRN - NP        polyethylene glycol (GLYCOLAX) packet 17 g  17 g Oral Daily PRN YOLANDA Cardona - PAULA        acetaminophen (TYLENOL) tablet 650 mg  650 mg Oral Q6H PRN YOLANDA Cardona NP        Or    acetaminophen (TYLENOL) suppository 650 mg  650 mg Rectal Q6H PRN Galilea Menezes, YOLANDA - NP        insulin lispro (HUMALOG) injection vial 0-12 Units  0-12 Units Subcutaneous TID WC YOLANDA Kenny - NP   6 Units at 06/13/21 1200    insulin lispro (HUMALOG) injection vial 0-6 Units  0-6 Units Subcutaneous Nightly Galilea Menezes, YOLANDA - NP        glucose (GLUTOSE) 40 % oral gel 15 g  15 g Oral PRN Galilea Menezes, YOLANDA - NP        dextrose 50 % IV solution  12.5 g Intravenous PRN Galilea Menezes, APRN - NP        glucagon (rDNA) injection 1 mg  1 mg Intramuscular PRN Galilea Menezes, YOLANDA - NP        dextrose 5 % solution  100 mL/hr Intravenous PRN Galilea Menezes, YOLANDA - NP        senna (SENOKOT) tablet 8.6 mg  1 tablet Oral Daily PRN Galilea Menezes, YOLANDA - NP        ipratropium-albuterol (DUONEB) nebulizer solution 3 mL  1 vial Inhalation TID Lois Thomas DO   3 mL at 06/13/21 1329    albuterol (PROVENTIL) nebulizer solution 2.5 mg  2.5 mg Nebulization Q2H PRN Lois Thomas DO           Additional work up or/and treatment plan may be added today or then after based on clinical progression. I am managing a portion of pt care. Some medical issues are handled by other specialists. Additional work up and treatment should be done in out pt setting by pt PCP and other out pt providers. In addition to examining and evaluating pt, I spent additional time explaining care, normaland abnormal findings, and treatment plan. All of pt questions were answered. Counseling, diet and education were provided. Case will be discussed with nursing staff when appropriate. Family will be updated if and when appropriate.        Electronically signed by Lois Thomas DO on 6/13/2021 at 2:34 PM

## 2021-06-13 NOTE — CARE COORDINATION
MET WITH PATIENT WHILE SHE SITTING UP IN [facility]. PT REQUESTS ROLATOR FOR HOME USE. MSG SENT TO PHYSICIAN. SHE STATES SHE REQUESTED AN RX FROM HER PCP, BUT HAS NOT RECEIVED IT. SHE DENIES ANY OTHER NEEDS FOR DISCHARGE. SHE STATES SHE HAS TO GO HOME BY TOMORROW BECAUSE SHE DOES NOT HAVE A . [facility] [place] Case Management Initial Discharge Assessment    Met with Patient to discuss discharge plan. PCP: Luz Marina Cruz PA-C                                Date of Last Visit: \"A COUPLE OF WEEKS AGO\"    If no PCP, list provided? N/A    Discharge Planning    Living Arrangements: independently at home    Who do you live with? NEPHEW, FAMILY    Who helps you with your care:  self    If lives at home:     Do you have any barriers navigating in your home? no    Patient can perform ADL? Yes    Current Services (outpatient and in home) :  None    Dialysis: No    Is transportation available to get to your appointments? Yes    DME Equipment:  yes - HOSPITAL BED    Respiratory equipment: Continuous Oxygen  3 Liters     Respiratory provider:  yes - [address] [place] [address] [place]:  yes - [facility]    Consult with Medication Assistance Program?  No      [facility]? Declined    Patient agreeable to SNF/Rehab? Declined    Other discharge needs identified? N/A    Does Patient Have a High-Risk for Readmission Diagnosis (CHF, PN, MI, COPD)? Yes, CHF, HTN, COPD, DM    If Yes,     Consult with pulmonologist? Yes   Consult with cardiologist? Yes   Cardiac Rehab referral if EF <35%? No   Consult with Pharmacy for medication assessment prior to discharge? Yes   Consult with Behavioral health to aid in depression, anxiety, or coping issues? No   Palliative Care Consult? No   Pulmonary Rehab order for COPD, PN, and CHF (if EF > 35%)? No    Does patient have a reliable scale and know how to read it (for CHF)? Yes   Nutrition consult for CHF?  Yes   Respiratory therapy consult that includes

## 2021-06-13 NOTE — PROGRESS NOTES
Pulmonary & Critical Care Medicine Progress Note    Subjective:     She is currently resting comfortably in the bedside chair. She did tolerate the use of BiPAP the last 24 hours or so. She is resting comfortably with nasal cannula in place. She states she did not sleep well overnight, but attributes this to sleeping so much the last several days.     EXAM:  General: No acute distress, sitting in the bedside chair  HEENT: Normocephalic, atraumatic, pupils equal round and reactive to light  Lungs : Clear to auscultation bilaterally  Heart: Regular rate  ABD: Obese, positive bowel sounds, soft, nontender  Extremities : Warm, dry, 2-3+ pitting edema noted  Neuro: Alert, awake, oriented x4  Skin: No rashes, chronic venous stasis changes noted    IV:   sodium chloride      dextrose         Vitals:  /81   Pulse 81   Temp 98.1 °F (36.7 °C) (Axillary)   Resp 19   Ht 5' 5\" (1.651 m)   Wt (!) 338 lb 6.4 oz (153.5 kg)   LMP  (LMP Unknown)   SpO2 96%   BMI 56.31 kg/m²          Intake/Output Summary (Last 24 hours) at 6/13/2021 1558  Last data filed at 6/13/2021 7416  Gross per 24 hour   Intake 600 ml   Output 2450 ml   Net -1850 ml       Medications:  Scheduled Meds:   furosemide  40 mg Intravenous BID    losartan  25 mg Oral Daily    carvedilol  3.125 mg Oral BID     buPROPion  150 mg Oral Daily    atorvastatin  10 mg Oral Nightly    sodium chloride flush  10 mL Intravenous 2 times per day    enoxaparin  40 mg Subcutaneous Daily    insulin lispro  0-12 Units Subcutaneous TID     insulin lispro  0-6 Units Subcutaneous Nightly    ipratropium-albuterol  1 vial Inhalation TID       Labs:   CBC:   Recent Labs     06/11/21  1615 06/12/21  0620 06/12/21  1546 06/13/21  1249   WBC 8.0  --   --  8.0   HGB 16.5*  19.4* 20.0* 19.3* 16.3*   HCT 51.3*  --   --  50.8*   MCV 99.7  --   --  99.1     --   --  203     BMP:   Recent Labs     06/11/21  1615 06/12/21  0620 06/12/21  1546 06/13/21  1249   NA 138  --   --  139   K 4.9  --   --  4.4   CL 94*  --   --  90*   CO2 41*  --   --  45*   BUN 10  --   --  13   CREATININE 0.92*  1.0 1.3* 1.1 0.89     LIVER PROFILE:   Recent Labs     06/11/21  1615   AST 14   ALT 13   BILITOT 0.6   ALKPHOS 95     PT/INR:   Recent Labs     06/11/21  1615   PROTIME 14.2   INR 1.1     APTT: No results for input(s): APTT in the last 72 hours. UA:No results for input(s): NITRITE, COLORU, PHUR, LABCAST, WBCUA, RBCUA, MUCUS, TRICHOMONAS, YEAST, BACTERIA, CLARITYU, SPECGRAV, LEUKOCYTESUR, UROBILINOGEN, BILIRUBINUR, BLOODU, GLUCOSEU, AMORPHOUS in the last 72 hours. Invalid input(s): KETONESU      Assessment/Plan:    1. Acute on chronic hypoxic and hypercapnic respiratory failure-she did have improvement of her ABGs when they were last repeated yesterday. Given that she has had significant improvement of her mental status, I do not feel that another set of ABGs is warranted unless she has changes neurologically over from a respiratory standpoint. She does need to have a split-night sleep study to have BiPAP arranged for her. We discussed increasing her activity level to ensure that she does not desaturate when she is up. 2. Probable KARISSA-outpatient split-night sleep study to determine the severity of KARISSA and her BiPAP requirements. 3. Polycythemia-secondary to chronic hypoxia. She is hoping to be able to be discharged home tomorrow. Given that she has had improvement from her respiratory standpoint, if she continues with improvement she may be able to be discharged. She will need a split-night sleep study arranged so that she can have BiPAP set up ASAP at time of discharge. We discussed increasing her activity level to ensure that she does not desaturate with ambulation.     Electronically signed by Gabriela Rosado DO, on 6/13/2021 at 3:58 PM

## 2021-06-14 LAB
ANION GAP SERPL CALCULATED.3IONS-SCNC: 8 MEQ/L (ref 9–15)
BASE EXCESS ARTERIAL: 25 (ref -3–3)
BASOPHILS ABSOLUTE: 0 K/UL (ref 0–0.2)
BASOPHILS RELATIVE PERCENT: 0.5 %
BUN BLDV-MCNC: 16 MG/DL (ref 6–20)
CALCIUM IONIZED: 1.17 MMOL/L (ref 1.12–1.32)
CALCIUM SERPL-MCNC: 9.2 MG/DL (ref 8.5–9.9)
CHLORIDE BLD-SCNC: 91 MEQ/L (ref 95–107)
CO2: 39 MEQ/L (ref 20–31)
CREAT SERPL-MCNC: 0.76 MG/DL (ref 0.5–0.9)
EKG ATRIAL RATE: 89 BPM
EKG P AXIS: 43 DEGREES
EKG P-R INTERVAL: 144 MS
EKG Q-T INTERVAL: 374 MS
EKG QRS DURATION: 76 MS
EKG QTC CALCULATION (BAZETT): 455 MS
EKG R AXIS: -3 DEGREES
EKG T AXIS: -7 DEGREES
EKG VENTRICULAR RATE: 89 BPM
EOSINOPHILS ABSOLUTE: 0.1 K/UL (ref 0–0.7)
EOSINOPHILS RELATIVE PERCENT: 1.8 %
GFR AFRICAN AMERICAN: 56
GFR AFRICAN AMERICAN: >60
GFR NON-AFRICAN AMERICAN: 47
GFR NON-AFRICAN AMERICAN: >60
GLUCOSE BLD-MCNC: 100 MG/DL (ref 60–115)
GLUCOSE BLD-MCNC: 73 MG/DL (ref 70–99)
GLUCOSE BLD-MCNC: 90 MG/DL (ref 60–115)
GLUCOSE BLD-MCNC: 91 MG/DL (ref 60–115)
GLUCOSE BLD-MCNC: 97 MG/DL (ref 60–115)
HCO3 ARTERIAL: 50.9 MMOL/L (ref 21–29)
HCT VFR BLD CALC: 51.7 % (ref 37–47)
HEMOGLOBIN: 16.5 G/DL (ref 12–16)
HEMOGLOBIN: 19.8 GM/DL (ref 12–16)
LACTATE: 0.51 MMOL/L (ref 0.4–2)
LYMPHOCYTES ABSOLUTE: 1.6 K/UL (ref 1–4.8)
LYMPHOCYTES RELATIVE PERCENT: 22.7 %
MCH RBC QN AUTO: 31.6 PG (ref 27–31.3)
MCHC RBC AUTO-ENTMCNC: 31.9 % (ref 33–37)
MCV RBC AUTO: 99 FL (ref 82–100)
MONOCYTES ABSOLUTE: 0.6 K/UL (ref 0.2–0.8)
MONOCYTES RELATIVE PERCENT: 9.2 %
NEUTROPHILS ABSOLUTE: 4.5 K/UL (ref 1.4–6.5)
NEUTROPHILS RELATIVE PERCENT: 65.8 %
O2 SAT, ARTERIAL: 90 % (ref 93–100)
PCO2 ARTERIAL: 101 MM HG (ref 35–45)
PDW BLD-RTO: 16.7 % (ref 11.5–14.5)
PERFORMED ON: ABNORMAL
PERFORMED ON: NORMAL
PH ARTERIAL: 7.31 (ref 7.35–7.45)
PLATELET # BLD: 177 K/UL (ref 130–400)
PO2 ARTERIAL: 71 MM HG (ref 75–108)
POC CHLORIDE: 89 MEQ/L (ref 99–110)
POC CREATININE: 1.2 MG/DL (ref 0.6–1.1)
POC FIO2: 50
POC HEMATOCRIT: 58 % (ref 36–48)
POC POTASSIUM: 4.5 MEQ/L (ref 3.5–5.1)
POC SAMPLE TYPE: ABNORMAL
POC SODIUM: 144 MEQ/L (ref 136–145)
POTASSIUM REFLEX MAGNESIUM: 4.1 MEQ/L (ref 3.4–4.9)
RBC # BLD: 5.22 M/UL (ref 4.2–5.4)
SODIUM BLD-SCNC: 138 MEQ/L (ref 135–144)
TCO2 ARTERIAL: >50 (ref 22–29)
WBC # BLD: 6.8 K/UL (ref 4.8–10.8)

## 2021-06-14 PROCEDURE — 2060000000 HC ICU INTERMEDIATE R&B

## 2021-06-14 PROCEDURE — 6370000000 HC RX 637 (ALT 250 FOR IP): Performed by: INTERNAL MEDICINE

## 2021-06-14 PROCEDURE — 85025 COMPLETE CBC W/AUTO DIFF WBC: CPT

## 2021-06-14 PROCEDURE — 99233 SBSQ HOSP IP/OBS HIGH 50: CPT | Performed by: INTERNAL MEDICINE

## 2021-06-14 PROCEDURE — 6360000002 HC RX W HCPCS: Performed by: INTERNAL MEDICINE

## 2021-06-14 PROCEDURE — 94640 AIRWAY INHALATION TREATMENT: CPT

## 2021-06-14 PROCEDURE — 2580000003 HC RX 258: Performed by: NURSE PRACTITIONER

## 2021-06-14 PROCEDURE — 80048 BASIC METABOLIC PNL TOTAL CA: CPT

## 2021-06-14 PROCEDURE — 94761 N-INVAS EAR/PLS OXIMETRY MLT: CPT

## 2021-06-14 PROCEDURE — APPSS30 APP SPLIT SHARED TIME 16-30 MINUTES: Performed by: PHYSICIAN ASSISTANT

## 2021-06-14 PROCEDURE — 2700000000 HC OXYGEN THERAPY PER DAY

## 2021-06-14 PROCEDURE — 6360000002 HC RX W HCPCS: Performed by: NURSE PRACTITIONER

## 2021-06-14 PROCEDURE — 94660 CPAP INITIATION&MGMT: CPT

## 2021-06-14 PROCEDURE — 94664 DEMO&/EVAL PT USE INHALER: CPT

## 2021-06-14 PROCEDURE — 93010 ELECTROCARDIOGRAM REPORT: CPT | Performed by: INTERNAL MEDICINE

## 2021-06-14 RX ADMIN — LOSARTAN POTASSIUM 25 MG: 25 TABLET, FILM COATED ORAL at 11:08

## 2021-06-14 RX ADMIN — ATORVASTATIN CALCIUM 10 MG: 10 TABLET, FILM COATED ORAL at 20:45

## 2021-06-14 RX ADMIN — SODIUM CHLORIDE, PRESERVATIVE FREE 10 ML: 5 INJECTION INTRAVENOUS at 11:08

## 2021-06-14 RX ADMIN — BUPROPION HYDROCHLORIDE 150 MG: 150 TABLET, EXTENDED RELEASE ORAL at 11:08

## 2021-06-14 RX ADMIN — ENOXAPARIN SODIUM 40 MG: 40 INJECTION SUBCUTANEOUS at 11:07

## 2021-06-14 RX ADMIN — CARVEDILOL 3.12 MG: 3.12 TABLET, FILM COATED ORAL at 17:43

## 2021-06-14 RX ADMIN — FUROSEMIDE 40 MG: 10 INJECTION, SOLUTION INTRAMUSCULAR; INTRAVENOUS at 17:43

## 2021-06-14 RX ADMIN — IPRATROPIUM BROMIDE AND ALBUTEROL SULFATE 3 ML: .5; 3 SOLUTION RESPIRATORY (INHALATION) at 06:59

## 2021-06-14 RX ADMIN — FUROSEMIDE 40 MG: 10 INJECTION, SOLUTION INTRAMUSCULAR; INTRAVENOUS at 11:07

## 2021-06-14 RX ADMIN — CARVEDILOL 3.12 MG: 3.12 TABLET, FILM COATED ORAL at 11:08

## 2021-06-14 RX ADMIN — IPRATROPIUM BROMIDE AND ALBUTEROL SULFATE 3 ML: .5; 3 SOLUTION RESPIRATORY (INHALATION) at 11:30

## 2021-06-14 RX ADMIN — IPRATROPIUM BROMIDE AND ALBUTEROL SULFATE 3 ML: .5; 3 SOLUTION RESPIRATORY (INHALATION) at 19:38

## 2021-06-14 ASSESSMENT — ENCOUNTER SYMPTOMS
COLOR CHANGE: 0
CHEST TIGHTNESS: 0
SHORTNESS OF BREATH: 1
VOMITING: 0
NAUSEA: 0

## 2021-06-14 ASSESSMENT — PAIN SCALES - GENERAL: PAINLEVEL_OUTOF10: 0

## 2021-06-14 NOTE — PROGRESS NOTES
Progress Note  Patient: Declan Felton  Unit/Bed: U743/S812-56  YOB: 1965  MRN: [id number]  Acct: [de-identified]   Admitting Diagnosis: Hypoxia [R09.02]  Date:  6/11/2021  Hospital Day: 3    Chief Complaint:  SOB    Subjective        6/14/21: Resting quietly in bed in no acute distress. On 4 L O2 per nasal cannula at this time. Patient states she normally wears 3 L O2 continuous at home. Shortness of breath improving. Denies chest pain. Complaining of bilateral lower extremity pain. Hemodynamically stable. On telemetry she is maintaining sinus rhythm with heart rate in the 70s. Telemetry alarms reviewed and patient noted to have 5 beat episode of PSVT at 1717 on 6/13/2021. She reports having a history of high blood pressure dyslipidemia but is noncompliant with medications. Also reports a history of obstructive sleep apnea but states that she is not compliant with BiPAP/CPAP but she lost it a long time ago. She is diuresing well on Lasix 40 mg IV twice daily with approximately 7400 mL negative fluid balance since admission. 6/13/21: Sitting up in chair. Denies any chest pain. Patient does admit occasional chest pressure over the past few months. History of negative stress test in 2020 per patient. -6.9 L total net fluid balance. Renal function is stable     6/12/21: Patient is a 55 y.o. female who presents with a chief complaint of SOB. Patient is followed on a regular basis by Dr. Iraida Arenas PA-C. Patient with multiple medical problems including chronic diastolic congestive heart failure, COPD, hypertension, hyperlipidemia, morbid obesity, likely KARISSA. Presented with worsening shortness of breath as well as increased lower extremity edema. Patient last seen in our office in February 2020 where she was supposed to undergo nuclear stress as well as echocardiogram but failed to do so. Patient does have a history of noncompliance. She denies any chest pain chest pressure heaviness. Pulmonary following for acute exacerbation COPD. Patient is currently on BiPAP. Initial cardiac enzyme is negative, BNP is elevated at thousand 88. She denies any history of myocardial infarction or arrhythmia. Denies history of stress test or cardiac catheterization. EKG with normal sinus rhythm, poor R wave progression across the cardial leads, T wave inversion in inferior leads. Review of Systems:   Review of Systems   Constitutional: Negative for activity change and fever. HENT: Negative for congestion. Respiratory: Positive for shortness of breath (improving). Negative for chest tightness. Cardiovascular: Positive for leg swelling. Negative for chest pain and palpitations. Gastrointestinal: Negative for nausea and vomiting. Genitourinary: Negative for difficulty urinating. Musculoskeletal: Negative for arthralgias. Skin: Negative for color change, pallor and rash. Neurological: Negative for dizziness and syncope. Psychiatric/Behavioral: Negative for agitation and behavioral problems. Physical Examination:    /71   Pulse 74   Temp 97.7 °F (36.5 °C) (Oral)   Resp 18   Ht 5' 5\" (1.651 m)   Wt (!) 336 lb (152.4 kg)   LMP  (LMP Unknown)   SpO2 91%   BMI 55.91 kg/m²    Physical Exam  Constitutional:       General: She is not in acute distress. Appearance: Normal appearance. HENT:      Head: Normocephalic and atraumatic. Cardiovascular:      Rate and Rhythm: Normal rate and regular rhythm. Pulmonary:      Effort: Pulmonary effort is normal. No respiratory distress. Breath sounds: No wheezing, rhonchi or rales. Abdominal:      Palpations: Abdomen is soft. Tenderness: There is no abdominal tenderness. Comments: Morbidly obese abdomen   Musculoskeletal:         General: Normal range of motion. Cervical back: Normal range of motion and neck supple.       Comments: No significant pitting edema of bilateral LE   Skin:     General: Skin is warm and dry. Neurological:      General: No focal deficit present. Mental Status: She is alert and oriented to person, place, and time. Cranial Nerves: No cranial nerve deficit.    Psychiatric:         Mood and Affect: Mood normal.         Behavior: Behavior normal.         LABS:  CBC:   Lab Results   Component Value Date    WBC 6.8 06/14/2021    RBC 5.22 06/14/2021    HGB 16.5 06/14/2021    HCT 51.7 06/14/2021    MCV 99.0 06/14/2021    MCH 31.6 06/14/2021    MCHC 31.9 06/14/2021    RDW 16.7 06/14/2021     06/14/2021    MPV 9.0 02/21/2014     CBC with Differential:   Lab Results   Component Value Date    WBC 6.8 06/14/2021    RBC 5.22 06/14/2021    HGB 16.5 06/14/2021    HCT 51.7 06/14/2021     06/14/2021    MCV 99.0 06/14/2021    MCH 31.6 06/14/2021    MCHC 31.9 06/14/2021    RDW 16.7 06/14/2021    LYMPHOPCT 22.7 06/14/2021    MONOPCT 9.2 06/14/2021    BASOPCT 0.5 06/14/2021    MONOSABS 0.6 06/14/2021    LYMPHSABS 1.6 06/14/2021    EOSABS 0.1 06/14/2021    BASOSABS 0.0 06/14/2021     CMP:    Lab Results   Component Value Date     06/14/2021    K 4.1 06/14/2021    CL 91 06/14/2021    CO2 39 06/14/2021    BUN 16 06/14/2021    CREATININE 0.76 06/14/2021    GFRAA >60.0 06/14/2021    LABGLOM >60.0 06/14/2021    GLUCOSE 73 06/14/2021    GLUCOSE 57 05/26/2020    PROT 7.1 06/11/2021    LABALBU 3.8 06/11/2021    CALCIUM 9.2 06/14/2021    BILITOT 0.6 06/11/2021    ALKPHOS 95 06/11/2021    AST 14 06/11/2021    ALT 13 06/11/2021     BMP:    Lab Results   Component Value Date     06/14/2021    K 4.1 06/14/2021    CL 91 06/14/2021    CO2 39 06/14/2021    BUN 16 06/14/2021    LABALBU 3.8 06/11/2021    CREATININE 0.76 06/14/2021    CALCIUM 9.2 06/14/2021    GFRAA >60.0 06/14/2021    LABGLOM >60.0 06/14/2021    GLUCOSE 73 06/14/2021    GLUCOSE 57 05/26/2020     Magnesium:    Lab Results   Component Value Date    MG 2.2 09/04/2013     Troponin:    Lab Results   Component Value Date    TROPONINI <0.010 06/11/2021       Radiology:  No results found. Echocardiogram 6/12/21:   Conclusions      Summary   Technically difficult examination. Normal left ventricular systolic function, no regional wall motion   abnormalities, estimated ejection fraction of 55%. Normal left ventricular size and function. Mild concentric left ventricular hypertrophy. Impaired relaxation compatible with diastolic dysfunction. ( reversed E/A   ratio)   No evidence of mitral regurgitation. No evidence of mitral valve stenosis. No evidence of aortic valve regurgitation . No evidence of aortic valve stenosis. There is Trace tricuspid regurgitation with estimated RVSP of 38 mm Hg. Signature      ----------------------------------------------------------------   Electronically signed by Vini Leung DO(Interpreting   physician) on 06/13/2021 11:12 PM        EKG 6/11/21: SR 89, ST depression with T wave inversion leads III and aVF, poor R wave progression V2-V6, QTc 455ms    Telemetry 6/14/21: SR 70s; 5 beat PSVT at 17:17 on 6/13/21      Assessment:    Active Hospital Problems    Diagnosis Date Noted    Hypoxia [R09.02] 06/19/2020     1. Dyspnea--likely multifactorial secondary to decompensated diastolic CHF +/- COPD +/- obesity hypoventilation syndrome vs other  2. Abnormal EKG  3. Morbid obesity  4. HTN  5. DM  6. Normal LVF EF 55% per echo 6/12/21  7. Mild PHTN with RVSP 38mmHg  8. Tobacco abuse  9. PSVT       Plan:  1. Maximize medical therapy-Coreg 3.125 mg p.o. twice daily, losartan 25 mg p.o. daily, Lipitor 10 mg p.o. daily, IV diuresis as tolerated-currently with Lasix 40 mg IV twice daily, insulin as directed  2. Cardiac/diabetic/less than 2 g sodium diet recommended  3. Check daily weight and strict intake and output  4. Monitor on telemetry for any tachycardia or bradycardia arrhythmias  5. Maintain potassium greater than 4, magnesium greater than 2  6. Weight loss recommended  7.  Tobacco cessation strongly recommended  8. Coronary evaluation per Dr. Deandre Mendez for cardiac catheterization as inpatient prior to discharge  9. Pulmonology recommendations  10. Internal medicine recommendations  11. Recommend outpatient follow-up with CHF clinic upon discharge  12. Further recommendations to follow          Electronically signed by DEREK Bennett on 6/14/2021 at 10:19 AM    Attending Supervising [de-identified] Attestation Statement  The patient is a 55 y.o. female. I have performed a history and physical examination of the patient. I discussed the case with the physician assistant. I reviewed the patient's Past Medical History, Past Surgical History, Medications, and Allergies. Physical Exam:  Vitals:    06/14/21 0626 06/14/21 0735 06/14/21 0829 06/14/21 1503   BP: 130/76  109/71 117/80   Pulse: 72 72 74 73   Resp: 19 20 18 17   Temp: 98 °F (36.7 °C)  97.7 °F (36.5 °C) 98.4 °F (36.9 °C)   TempSrc: Axillary  Oral Oral   SpO2: 96% 92% 91% 94%   Weight:       Height:               Pulmonary/Chest: clear to auscultation bilaterally- no wheezes, rales or rhonchi, normal air movement, no respiratory distress  Cardiovascular: normal rate, normal S1 and S2, no gallops, intact distal pulses and no carotid bruits  Abdomen: soft, non-tender, non-distended, normal bowel sounds, no masses or organomegaly    Active Hospital Problems    Diagnosis Date Noted    Hypoxia [R09.02] 06/19/2020     Priority: Low        I reviewed and agree with the findings and plan documented in her note . ASSESSMENT:     Shortness of breath-multifactorial secondary to acute on chronic decompensated diastolic congestive heart failure, acute exacerbation COPD, obesity hypopnea syndrome, pulmonary hypertension, obstructive sleep apnea.     Abnormal EKG     Likely KARISSA     Morbid obesity     History of medical noncompliance     Essential hypertension     Mixed hyperlipidemia     Multiple medical problems        PLAN:   1.  As always, aggressive risk factor modification is strongly recommended. We should adhere to the JNC VIII guidelines for HTN management and the NCEPATP III guidelines for LDL-C management. 2. Coronary evaluation when clinically stable. Left heart catheterization tomorrow p.m. patient with multiple risk factors for CAD, abnormal EKG, recurrent congestive heart failure symptoms. Thu Benitez is a poor stress test candidate secondary to body habitus.  Would recommend undergoing cardiac catheterization to delineate her coronary anatomy and obtain definitive diagnosis. Maximize cardiac medications  3. IV diuretics as tolerated.  Monitor I's and O's and renal function electrolytes.  Keep potassium greater than 4, magnesium greater than 2  4. Continue to monitor on telemetry  5. CHF teaching  6. CHF clinic follow-up post discharge.  Patient with high risk of readmission  7. Pulmonary recommendations  8.  Weight loss recommended       Electronically signed by Kaye Hernandez DO on 6/14/21 at 3:53 PM EDT

## 2021-06-14 NOTE — PROGRESS NOTES
[facility] Respiratory Therapy Evaluation   Current Order:  duoneb tid albuterol q2 prn      Home Regimen:  prn     Ordering Physician: traci  Re-evaluation Date:  6/17     Diagnosis: hypoxia     Patient Status: Stable / Unstable + Physician notified    The following MDI Criteria must be met in order to convert aerosol to MDI with spacer.  If unable to meet, MDI will be converted to aerosol:  []  Patient able to demonstrate the ability to use MDI effectively  []  Patient alert and cooperative  []  Patient able to take deep breath with 5-10 second hold  []  Medication(s) available in this delivery method   []  Peak flow greater than or equal to 200 ml/min            Current Order Substituted To  (same drug, same frequency)   Aerosol to MDI [] Albuterol Sulfate 0.083% unit dose by aerosol Albuterol Sulfate MDI 2 puffs by inhalation with spacer    [] Levalbuterol 1.25 mg unit dose by aerosol Levalbuterol MDI 2 puffs by inhalation with spacer    [] Levalbuterol 0.63 mg unit dose by aerosol Levalbuterol MDI 2 puffs by inhalation with spacer    [] Ipratropium Bromide 0.02% unit dose by aerosol Ipratropium Bromide MDI 2 puffs by inhalation with spacer    [] Duoneb (Ipratropium + Albuterol) unit dose by aerosol Ipratropium MDI + Albuterol MDI 2 puffs by inhalation w/spacer   MDI to Aerosol [] Albuterol Sulfate MDI Albuterol Sulfate 0.083% unit dose by aerosol    [] Levalbuterol MDI 2 puftid + q2 __________________________per Protocol, P&T, [facility]    fs by inhalation Levalbuterol 1.25 mg unit dose by aerosol    [] Ipratropium Bromide MDI by inhalation Ipratropium Bromide 0.02% unit dose by aerosol    [] Combivent (Ipratropium + Albuterol) MDI by inhalation Duoneb (Ipratropium + Albuterol) unit dose by aerosol       Treatment Assessment [Frequency/Schedule]:  Change frequency to: ___________no changes_____________  Points 0 1 2 3 4   Pulmonary Status  Non-Smoker  []   Smoking history   < 20 pack years  []   Smoking history  ?  20 pack years  []   Pulmonary Disorder  (acute or chronic)  [x]   Severe or Chronic w/ Exacerbation  []     Surgical Status No [x]   Surgeries     General []   Surgery Lower []   Abdominal Thoracic or []   Upper Abdominal Thoracic with  PulmonaryDisorder  []     Chest X-ray Clear/Not  Ordered     []  Chronic Changes  Results Pending  []  Infiltrates, atelectasis, pleural effusion, or edema  [x]  Infiltrates in more than one lobe []  Infiltrate + Atelectasis, &/or pleural effusion  []    Respiratory Pattern Regular,  RR = 12-20 [x]  Increased,  RR = 21-25 []  WHITE, irregular,  or RR = 26-30 []  Decreased FEV1  or RR = 31-35 []  Severe SOB, use  of accessory muscles, or RR ? 35  []    Mental Status Alert, oriented,  Cooperative [x]  Confused but Follows commands []  Lethargic or unable to follow commands []  Obtunded  []  Comatose  []    Breath Sounds Clear to  auscultation  []  Decreased unilaterally or  in bases only [x]  Decreased  bilaterally  []  Crackles or intermittent wheezes []  Wheezes []    Cough Strong, Spontan., & nonproductive [x]  Strong,  spontaneous, &  productive []  Weak,  Nonproductive []  Weak, productive or  with wheezes []  No spontaneous  cough or may require suctioning []    Level of Activity Ambulatory [x]  Ambulatory w/ Assist  []  Non-ambulatory []  Paraplegic []  Quadriplegic []    Total    Score:___6____     Triage Score:__4______      Tri       Triage:     1. (>20) Freq: Q3    2. (16-20) Freq: Q4   3. (11-15) Freq: QID & Albuterol Q2 PRN    4. (6-10) Freq: TID & Albuterol Q2 PRN    5. (0-5) Freq Q4prn

## 2021-06-14 NOTE — PROGRESS NOTES
Department of Internal Medicine  General Internal Medicine  Attending Progress Note      SUBJECTIVE:  Pt seen and examined. States that plan is for cardiac cath tomorrow. Pt hoping to go home tomorrow after procedure.  Feels breathing is back to baseline    OBJECTIVE      Medications    Current Facility-Administered Medications: furosemide (LASIX) injection 40 mg, 40 mg, Intravenous, BID  losartan (COZAAR) tablet 25 mg, 25 mg, Oral, Daily  carvedilol (COREG) tablet 3.125 mg, 3.125 mg, Oral, BID WC  buPROPion (WELLBUTRIN XL) extended release tablet 150 mg, 150 mg, Oral, Daily  atorvastatin (LIPITOR) tablet 10 mg, 10 mg, Oral, Nightly  sodium chloride flush 0.9 % injection 10 mL, 10 mL, Intravenous, 2 times per day  sodium chloride flush 0.9 % injection 10 mL, 10 mL, Intravenous, PRN  0.9 % sodium chloride infusion, 25 mL, Intravenous, PRN  enoxaparin (LOVENOX) injection 40 mg, 40 mg, Subcutaneous, Daily  ondansetron (ZOFRAN-ODT) disintegrating tablet 4 mg, 4 mg, Oral, Q8H PRN **OR** ondansetron (ZOFRAN) injection 4 mg, 4 mg, Intravenous, Q6H PRN  polyethylene glycol (GLYCOLAX) packet 17 g, 17 g, Oral, Daily PRN  acetaminophen (TYLENOL) tablet 650 mg, 650 mg, Oral, Q6H PRN **OR** acetaminophen (TYLENOL) suppository 650 mg, 650 mg, Rectal, Q6H PRN  insulin lispro (HUMALOG) injection vial 0-12 Units, 0-12 Units, Subcutaneous, TID WC  insulin lispro (HUMALOG) injection vial 0-6 Units, 0-6 Units, Subcutaneous, Nightly  glucose (GLUTOSE) 40 % oral gel 15 g, 15 g, Oral, PRN  dextrose 50 % IV solution, 12.5 g, Intravenous, PRN  glucagon (rDNA) injection 1 mg, 1 mg, Intramuscular, PRN  dextrose 5 % solution, 100 mL/hr, Intravenous, PRN  senna (SENOKOT) tablet 8.6 mg, 1 tablet, Oral, Daily PRN  ipratropium-albuterol (DUONEB) nebulizer solution 3 mL, 1 vial, Inhalation, TID  albuterol (PROVENTIL) nebulizer solution 2.5 mg, 2.5 mg, Nebulization, Q2H PRN  Physical    VITALS:  /80   Pulse 73   Temp 98.4 °F (36.9 °C) (Oral) Resp 17   Ht 5' 5\" (1.651 m)   Wt (!) 336 lb (152.4 kg)   LMP  (LMP Unknown)   SpO2 94%   BMI 55.91 kg/m²   Constitutional: Awake and alert in no acute distress. Sitting in chair comfortably  Head: Normocephalic, atraumatic  Eyes: EOMI, PERRLA  ENT: moist mucous membranes, nasal cannula  Neck: neck supple, trachea midline  Lungs: Good inspiratory effort, CTABL, no wheeze, no rhonchi, no rales  Heart: RRR, normal S1 and S2  GI: Soft, non-distended, non tender, no guarding, no rebound, +BS  MSK: 3+ pitting edema noted  Skin: warm, dry  Psych: appropriate affect     Data    CBC:   Lab Results   Component Value Date    WBC 6.8 06/14/2021    RBC 5.22 06/14/2021    HGB 16.5 06/14/2021    HCT 51.7 06/14/2021    MCV 99.0 06/14/2021    MCH 31.6 06/14/2021    MCHC 31.9 06/14/2021    RDW 16.7 06/14/2021     06/14/2021    MPV 9.0 02/21/2014     CMP:    Lab Results   Component Value Date     06/14/2021    K 4.1 06/14/2021    CL 91 06/14/2021    CO2 39 06/14/2021    BUN 16 06/14/2021    CREATININE 0.76 06/14/2021    GFRAA >60.0 06/14/2021    LABGLOM >60.0 06/14/2021    GLUCOSE 73 06/14/2021    GLUCOSE 57 05/26/2020    PROT 7.1 06/11/2021    LABALBU 3.8 06/11/2021    CALCIUM 9.2 06/14/2021    BILITOT 0.6 06/11/2021    ALKPHOS 95 06/11/2021    AST 14 06/11/2021    ALT 13 06/11/2021       ASSESSMENT AND PLAN      # Acute on chronic hypoxic and hypercapnic respiratory failure  - multifactorial between acute on chronic diastolic CHF, OHS and KARISSA  - breathing improving with diuresis. Over 7L fluid diuresed. - continuing diuresis  - cardiology consulted- cardiac cath Tues  - pulm consulted - will need outpatient sleep study  - O2 as needed- on baseline 3L    # DM2  - ISS    # HTN/HLD  - cont home meds    DVT: lovenox    Disposition: Pt to have cardiac cath tomorrow. Will discharge when ok from cardiology stand.        Jhonathan Horner, DO  Internal Medicine

## 2021-06-14 NOTE — CARE COORDINATION
DISCUSSED CASE WITH DR Christina Louie, NOTIFIED PT IS REQUESTING A ROLATOR AND WILL NEED PT/OT EVAL FOR DOCUMENTATION.

## 2021-06-14 NOTE — PROGRESS NOTES
INPATIENT PROGRESS NOTES    PATIENT NAME: Kailyn Bonilla  MRN: [id number]  SERVICE DATE:  June 14, 2021   SERVICE TIME:  3:54 PM      PRIMARY SERVICE: Pulmonary Disease    CHIEF COMPLAIN: Acute respiratory failure    INTERVAL HPI: Patient seen and examined at bedside, Interval Notes, orders reviewed. Nursing notes noted  Patient is feeling much better. She said her breathing is much easier than she came in. She is currently on BiPAP during the night and as needed. No chest pain. No fever or chills. O2 saturation 94% on 4 L O2 via nasal cannula. No nausea vomiting diarrhea or abdominal pain. OBJECTIVE    Body mass index is 55.91 kg/m². PHYSICAL EXAM:  Vitals:  /80   Pulse 73   Temp 98.4 °F (36.9 °C) (Oral)   Resp 17   Ht 5' 5\" (1.651 m)   Wt (!) 336 lb (152.4 kg)   LMP  (LMP Unknown)   SpO2 94%   BMI 55.91 kg/m²   General: Morbidly obese, alert, awake . comfortable in bed, No distress. Head: Atraumatic , Normocephalic   Eyes: PERRL. No sclera icterus. No conjunctival injection. No discharge   ENT: No nasal  discharge. Pharynx clear. Neck:  Trachea midline. No thyromegaly, no JVD, No cervical adenopathy. Chest : Bilaterally symmetrical ,Normal effort,  No accessory muscle use  Lung : . Fair BS bilateral, decreased BS at bases. Bibasilar Rales. No wheezing. No rhonchi. Heart[de-identified] Normal  rate. Regular rhythm. No mumur ,  Rub or gallop  ABD: Non-tender. Non-distended. No masses. No organmegaly. Normal bowel sounds. No hernia.   Ext : 2+ pitting both leg , No Cyanosis No clubbing  Neuro: no focal weakness          DATA:   Recent Labs     06/13/21  1249 06/14/21  0642   WBC 8.0 6.8   HGB 16.3* 16.5*   HCT 50.8* 51.7*   MCV 99.1 99.0    177     Recent Labs     06/11/21  1615 06/13/21  1249 06/14/21  0642    139 138   K 4.9 4.4 4.1   CL 94* 90* 91*   CO2 41* 45* 39*   BUN 10 13 16   CREATININE 0.92*  1.0 0.89 0.76   GLUCOSE 98 118* 73   CALCIUM 9.4 9.2 9.2   PROT 7.1  --   --    LABALBU 3.8  --   --    BILITOT 0.6  --   --    ALKPHOS 95  --   --    AST 14  --   --    ALT 13  --   --    LABGLOM >60.0  58* >60.0 >60.0   GFRAA >60.0  >60 >60.0 >60.0   GLOB 3.3  --   --        MV Settings:     FiO2 : 50 %    Recent Labs     06/12/21  1546   PHART 7.338*   UXD9YAB 94*   PO2ART 80*   ERP4LBP 50.4*   BEART 25*   [id number] 94*       O2 Device: Nasal cannula  O2 Flow Rate (L/min): 4 L/min    ADULT DIET; Regular;  Low Sodium (2 gm)     MEDICATIONS during current hospitalization:    Continuous Infusions:   sodium chloride      dextrose         Scheduled Meds:   furosemide  40 mg Intravenous BID    losartan  25 mg Oral Daily    carvedilol  3.125 mg Oral BID WC    buPROPion  150 mg Oral Daily    atorvastatin  10 mg Oral Nightly    sodium chloride flush  10 mL Intravenous 2 times per day    enoxaparin  40 mg Subcutaneous Daily    insulin lispro  0-12 Units Subcutaneous TID WC    insulin lispro  0-6 Units Subcutaneous Nightly    ipratropium-albuterol  1 vial Inhalation TID       PRN Meds:sodium chloride flush, sodium chloride, ondansetron **OR** ondansetron, polyethylene glycol, acetaminophen **OR** acetaminophen, glucose, dextrose, glucagon (rDNA), dextrose, senna, albuterol    Radiology  Echocardiogram complete 2D with doppler with color    Result Date: 6/13/2021  Transthoracic Echocardiography Report (TTE)  Demographics   Patient Name    Modesto Wang Gender                Female   Patient Number  [id number]     Race                                                  Ethnicity   Visit Number    [id number]    Room Number           N094   Corporate ID                 Date of Study         06/12/2021   Accession       [id number]   Referring Physician  Number   Date of Birth   1965   Sonographer           Sp Tian RDCS   Age             55 year(s)   Interpreting          [facility]) Cardiology                               Physician             Hermilo Archer., DO  Procedure Type of Study TTE procedure:ECHO COMPLETE 2D W/DOP W/COLOR. Procedure Date Date: 06/12/2021 Start: 09:44 AM Study Location: Portable Technical Quality: Adequate visualization Indications:Congestive heart failure. Patient Status: Routine Height: 65 inches Weight: 350 pounds BSA: 2.51 m^2 BMI: 58.24 kg/m^2 BP: 146/84 mmHg  Conclusions   Summary  Technically difficult examination. Normal left ventricular systolic function, no regional wall motion  abnormalities, estimated ejection fraction of 55%. Normal left ventricular size and function. Mild concentric left ventricular hypertrophy. Impaired relaxation compatible with diastolic dysfunction. ( reversed E/A  ratio)  No evidence of mitral regurgitation. No evidence of mitral valve stenosis. No evidence of aortic valve regurgitation . No evidence of aortic valve stenosis. There is Trace tricuspid regurgitation with estimated RVSP of 38 mm Hg. Signature   ----------------------------------------------------------------  Electronically signed by Petar Pinedo DO(Interpreting  physician) on 06/13/2021 11:12 PM  ----------------------------------------------------------------   Findings  Left Ventricle Normal left ventricular systolic function, no regional wall motion abnormalities, estimated ejection fraction of 55%. Normal left ventricular size and function. Mild concentric left ventricular hypertrophy. Impaired relaxation compatible with diastolic dysfunction. ( reversed E/A ratio) Right Ventricle Normal right ventricle structure and function. Normal right ventricle systolic pressure. Left Atrium Normal left atrium. Right Atrium Normal right atrium. Mitral Valve Diffusely thickened and pliable mitral valve leaflets with normal excursion. No evidence of mitral regurgitation. No evidence of mitral valve stenosis. Tricuspid Valve Normal tricuspid valve structure and function. There is Trace tricuspid regurgitation with estimated RVSP of 38 mm Hg.  Aortic Valve The aortic 77.42 ml/31 m^2  EF Calculated: 45.2 %   LVOT Diameter: 2.33 cm   Right Atrium   RA Systolic Pressure: 5 mmHg   Right Ventricle            RV Systolic Pressure: 94.46 mmHg  Aorta/ Miscellaneous Aorta   Aortic Root: 3.98 cm  LVOT Diameter: 2.33 cm      XR CHEST (2 VW)    Result Date: 6/11/2021  XR CHEST (2 VW): 6/11/2021 5:38 PM CLINICAL HISTORY:  sob . COMPARISON: None available. TECHNIQUE: A portable upright AP radiograph of the chest was obtained. FINDINGS: The cardiac silhouette is at the upper limits of normal which may be secondary to cardiomegaly versus pericardial effusion. The aorta is ectatic which most commonly correlates with chronic hypertension. There are diffuse increased interstitial pulmonary markings throughout both lungs which may be secondary to pulmonary edema. The findings may reflect chronic congestive heart failure, CHF. .     CT HEAD WO CONTRAST    Result Date: 6/11/2021  EXAMINATION: CT HEAD WO CONTRAST, 6/11/2021 7:07 PM CLINICAL HISTORY:  falls and fatigue COMPARISON: None TECHNIQUE:  Multiple contiguous axial images of the head were obtained from the skull base through the skull vertex without intravenous contrast. Sagittal and coronal reformats have been produced. All CT scans at this facility use dose modulation, iterative reconstruction, and/or weight based dosing when appropriate to reduce radiation dose to as low as reasonably achievable. BRAIN CT FINDINGS: Gray-white matter differentiation is maintained. No acute hemorrhage, mass, mass effect, or midline shift. There is no evidence of atrophy, ventricular morphology is within normal limits. The subcortical and periventricular white matter is within normal limits. The basal ganglia are within normal limits. There are no acute changes or space-occupying lesions in the posterior fossa. There is crowding of the cerebellar tonsils at the foramen magnum. The visualized portions of the orbits are within normal limits.  The globes are intact. The imaged portions of the paranasal sinuses are unremarkable. The calvarium is intact. There is no acute intracranial process. Crowding of the cerebellar tonsils at the foramen magnum may indicate a Chiari I malformation. May consider elective MRI evaluation. CTA Chest W WO  (PE study)    Result Date: 6/11/2021  EXAM:CTA CHEST W WO CONTRAST DATE6/11/2021 7:07 PM: REASON FOR EXAM:Acute severe anterior chest wall pain. sob COMPARISON: none Technique: Helical CT was performed through the chest following the IV administration of100  cc of nonionic contrast. 3-D MIP reconstructions were performed on an independent workstation in the coronal plane with thick slab technique utilized in the interpretation. 3-D maximum intensity projection performed. All CT scans at this facility use dose modulation, iterative reconstruction, and/or weight based dosing when appropriate to reduce radiation dose to as low as reasonably achievable. CHEST CTA  FINDINGS: Mediastinum: Mediastinum and fortino are normal. There are no pathologically enlarged lymph nodes. The chest wall and lower neck are normal Heart: The heart is within normal limits. There is no pericardial effusion. Vascular structures: There is no evidence for thoracic aortic aneurysm or dissection. No evidence of traumatic aortic injury. There are no persistent filling defects within the pulmonary arteries. Lungs: There are no focal infiltrates or consolidations. There is no pneumothorax. There is dependent atelectasis in the posterior portions of the lung bases and there are areas of increased linear interstitial markings in both lungs and costophrenic recesses which may represent scarring from previous infection, inflammation. Pleura: No pleural effusion or thickening. Upper abdomen: The visualized portions of the upper abdomen are unremarkable.  Bones/axillae/soft tissues: Osseous structures and chest wall are normal.     Negative CTA of the chest. No evidence of thoracic aortic dissection or aneurysm. The study is negative for pulmonary emboli. There are no acute infiltrates or effusions. There are areas of increased linear interstitial markings in both lung bases which may represent residual scarring from prior infection, inflammation. IMPRESSION AND SUGGESTION:  1. Acute on chronic hypoxic and hypercapnic respiratory failure  2. Possible KARISSA  3. Polycythemia  4. Morbid obesity    Patient had ABG done shows pH 7.33 with PCO2 of 94 PO2 80 saturation is 94 bicarb of 50.4. Continue BiPAP therapy. Patient had a home sleep study done in the past shows mild sleep apnea. Will discuss with DME if patient qualify for trilogy before discharge. Continue O2 to keep saturation 90% above. We will follow      NOTE: This report was transcribed using voice recognition software. Every effort was made to ensure accuracy; however, inadvertent computerized transcription errors may be present.       Electronically signed by Suni Flores MD, [facility] on 6/14/2021 at 3:54 PM

## 2021-06-14 NOTE — PROGRESS NOTES
Chief Complaint   Patient presents with    Shortness of Breath         6-12-21: Patient is a 55 y.o. female who presents with a chief complaint of SOB. Patient is followed on a regular basis by Dr. Aundrea Parry PA-C. Patient with multiple medical problems including chronic diastolic congestive heart failure, COPD, hypertension, hyperlipidemia, morbid obesity, likely KARISSA. Presented with worsening shortness of breath as well as increased lower extremity edema. Patient last seen in our office in February 2020 where she was supposed to undergo nuclear stress as well as echocardiogram but failed to do so. Patient does have a history of noncompliance. She denies any chest pain chest pressure heaviness. Pulmonary following for acute exacerbation COPD. Patient is currently on BiPAP. Initial cardiac enzyme is negative, BNP is elevated at thousand 88. She denies any history of myocardial infarction or arrhythmia. Denies history of stress test or cardiac catheterization. EKG with normal sinus rhythm, poor R wave progression across the cardial leads, T wave inversion in inferior leads. 6-13-21: Sitting up in chair. Denies any chest pain. Patient does admit occasional chest pressure over the past few months. History of negative stress test in 2020 per patient.  -6.9 L total net fluid balance.   Renal function is stable        Past Medical History        Past Medical History:   Diagnosis Date    Anxiety      Asthma      CHF (congestive heart failure) (HCC)      Chronic back pain      COPD (chronic obstructive pulmonary disease) (HCC)      Depression      Hypertension      Obesity      Pelvic pain in female 2/15/2018    Type 2 diabetes mellitus without complication ([facility] [address] 2/3/4546             Patient Active Problem List   Diagnosis    Bilateral leg edema    KARISSA (obstructive sleep apnea)    SOB (shortness of breath)    Tobacco abuse    Tobacco abuse counseling    BMI 50.0-59.9, adult ([facility] [address]    LVH (left ventricular hypertrophy)    Diastolic dysfunction    Essential hypertension    Coronary artery disease involving native coronary artery of native heart    Weight gain    Vitamin D deficiency    Depressed mood    Osteoarthritis of knee    Malaise and fatigue    Hypertensive heart disease with heart failure (HCC)    Generalized anxiety disorder    Gastro-esophageal reflux disease without esophagitis    Chronic obstructive pulmonary disease (HCC)    Cervical disc disorder    Hypoxia    Intertrigo    Dental caries    Urge incontinence of urine    Dysmetabolic syndrome    Prediabetes     Cellulitis of both lower extremities         Past Surgical History         Past Surgical History:   Procedure Laterality Date    BLADDER SUSPENSION   02/2014    CARPAL TUNNEL RELEASE   89 or 90     right hand     HYSTERECTOMY   2009     full    [place] [facility] ANTERPOST COLPORRAPHY W/CYSTO W/NTRCL RPR N/A 2/19/2018     EXCISION OF VAGINAL MESH, CYSTOSCOPY performed by Jhonny Trevino DO at [facility]         27 yrs ago            Social History   Social History            Socioeconomic History    Marital status:         Spouse name: None    Number of children: None    Years of education: None    Highest education level: None   Occupational History    None   Tobacco Use    Smoking status: Current Every Day Smoker       Packs/day: 1.00       Types: Cigarettes       Start date: 5/6/1979    Smokeless tobacco: Never Used   Substance and Sexual Activity    Alcohol use: No    Drug use: No    Sexual activity: Yes       Partners: Male   Other Topics Concern    None   Social History Narrative    None      Social Determinants of Health          Financial Resource Strain: Low Risk     Difficulty of Paying Living Expenses: Not hard at all   Food Insecurity:     Worried About [address] in the Last Year:     [address] in the Last Year:    Transportation Needs:     Lack of Transportation (Medical):      Lack of Transportation (Non-Medical):    Physical Activity:     Days of Exercise per Week:     Minutes of Exercise per Session:    Stress:     Feeling of Stress :    Social Connections:     Frequency of Communication with Friends and Family:     Frequency of Social Gatherings with Friends and Family:     Attends [employer] Services:     Active Member of Clubs or Organizations:     Attends Club or Organization Meetings:     Marital Status:    Intimate Partner Violence:     Fear of Current or Ex-Partner:     Emotionally Abused:     Physically Abused:     Sexually Abused:             Family History   Family History   Problem Relation Age of Onset    Alzheimer's Disease Father      High Blood Pressure Father      Stroke Father           11 strokes in one year    Asthma Sister      Asthma Brother      No Known Problems Daughter      No Known Problems Daughter              Current Facility-Administered Medications             Current Facility-Administered Medications   Medication Dose Route Frequency Provider Last Rate Last Admin    furosemide (LASIX) injection 40 mg  40 mg Intravenous BID [facility] Braxton, DO        losartan (COZAAR) tablet 25 mg  25 mg Oral Daily Pitney Braxton, DO        carvedilol (COREG) tablet 3.125 mg  3.125 mg Oral BID [facility], DO        ipratropium-albuterol (DUONEB) nebulizer solution 1 ampule  1 ampule Inhalation Q4H GONSALO Peace, DO        buPROPion (WELLBUTRIN XL) extended release tablet 150 mg  150 mg Oral Daily Pitney Braxton, DO        atorvastatin (LIPITOR) tablet 10 mg  10 mg Oral Nightly Pitney Braxton, DO        sodium chloride flush 0.9 % injection 10 mL  10 mL Intravenous 2 times per day YOLANDA Wild NP   10 mL at 06/11/21 2110    sodium chloride flush 0.9 % injection 10 mL  10 mL Intravenous PRN YOLANDA Wild NP        0.9 % sodium chloride infusion  25 mL Intravenous PRN Yina Cain Noel Daly - NP        enoxaparin (LOVENOX) injection 40 mg  40 mg Subcutaneous Daily Jim Post, APRN - NP   40 mg at 06/11/21 2110    ondansetron (ZOFRAN-ODT) disintegrating tablet 4 mg  4 mg Oral Q8H PRN Jim Post, APRN - NP         Or    ondansetron (ZOFRAN) injection 4 mg  4 mg Intravenous Q6H PRN Jim Post, APRN - NP        polyethylene glycol (GLYCOLAX) packet 17 g  17 g Oral Daily PRN Jim Post, APRN - NP        acetaminophen (TYLENOL) tablet 650 mg  650 mg Oral Q6H PRN Jim Post, APRN - NP         Or    acetaminophen (TYLENOL) suppository 650 mg  650 mg Rectal Q6H PRN Jim Post, APRN - NP        insulin lispro (HUMALOG) injection vial 0-12 Units  0-12 Units Subcutaneous TID WC Jim Post, APRN - NP        insulin lispro (HUMALOG) injection vial 0-6 Units  0-6 Units Subcutaneous Nightly Jim Post, APRN - NP        glucose (GLUTOSE) 40 % oral gel 15 g  15 g Oral PRN Jim Post, APRN - NP        dextrose 50 % IV solution  12.5 g Intravenous PRN Jim Post, APRN - NP        glucagon (rDNA) injection 1 mg  1 mg Intramuscular PRN Jim Post, APRN - NP        dextrose 5 % solution  100 mL/hr Intravenous PRN Jim Post, APRN - NP        senna (SENOKOT) tablet 8.6 mg  1 tablet Oral Daily PRN Jim Post, APRN - NP        ipratropium-albuterol (DUONEB) nebulizer solution 3 mL  1 vial Inhalation TID Zorita , DO   3 mL at 06/12/21 0703    albuterol (PROVENTIL) nebulizer solution 2.5 mg  2.5 mg Nebulization Q2H PRN Zorita , DO                ALLERGIES: Food and Other     Review of Systems   Constitutional: Positive for fatigue. Negative for chills and fever. Eyes: Negative. Respiratory: Positive for shortness of breath. Negative for wheezing. Cardiovascular: Positive for leg swelling. Negative for chest pain and palpitations. Gastrointestinal: Negative. Negative for abdominal pain, nausea and vomiting. Musculoskeletal: Negative. Skin: Negative. Negative for rash. Allergic/Immunologic: Negative. Neurological: Negative for dizziness, weakness and headaches. Hematological: Negative. Psychiatric/Behavioral: Negative.             VITALS:  Blood pressure 121/66, pulse 84, temperature 97.9 °F (36.6 °C), resp. rate 20, height 5' 5\" (1.651 m), weight (!) 350 lb (158.8 kg), SpO2 97 %. Body mass index is 58.24 kg/m².     Physical Exam  Constitutional:       Appearance: She is well-developed. She is obese. She is not diaphoretic. HENT:      Head: Normocephalic and atraumatic. Eyes:      Pupils: Pupils are equal, round, and reactive to light. Neck:      Thyroid: No thyromegaly. Vascular: No JVD. Trachea: No tracheal deviation. Cardiovascular:      Rate and Rhythm: Normal rate and regular rhythm. Chest Wall: PMI is not displaced. Pulses: Intact distal pulses. Heart sounds: Normal heart sounds. Heart sounds not distant. No murmur heard. No friction rub. No gallop. No S3 sounds. Pulmonary:      Effort: No respiratory distress. Breath sounds: No wheezing or rales. Chest:      Chest wall: No tenderness. Abdominal:      General: Bowel sounds are normal. There is no distension. Palpations: Abdomen is soft. There is no mass. Tenderness: There is no abdominal tenderness. There is no guarding or rebound. Musculoskeletal:         General: Swelling present. Cervical back: Normal range of motion and neck supple. Skin:     General: Skin is warm and dry. Coloration: Skin is not pale. Findings: No erythema or rash. Neurological:      Mental Status: She is alert and oriented to person, place, and time. Cranial Nerves: No cranial nerve deficit. Psychiatric:         Behavior: Behavior normal.         Thought Content:  Thought content normal.         Judgment: Judgment normal.            LABS:  Recent Results         Recent Results (from the past 24 hour(s))   CBC Auto Differential     Collection Time: 06/11/21  4:15 PM   Result Value Ref Range     WBC 8.0 4.8 - 10.8 K/uL     RBC 5.15 4.20 - 5.40 M/uL     Hemoglobin 16.5 (H) 12.0 - 16.0 g/dL     Hematocrit 51.3 (H) 37.0 - 47.0 %     MCV 99.7 82.0 - 100.0 fL     MCH 32.1 (H) 27.0 - 31.3 pg     MCHC 32.2 (L) 33.0 - 37.0 %     RDW 17.0 (H) 11.5 - 14.5 %     Platelets 431 461 - 658 K/uL     Neutrophils % 77.0 %     Lymphocytes % 15.5 %     Monocytes % 6.5 %     Eosinophils % 0.5 %     Basophils % 0.5 %     Neutrophils Absolute 6.1 1.4 - 6.5 K/uL     Lymphocytes Absolute 1.2 1.0 - 4.8 K/uL     Monocytes Absolute 0.5 0.2 - 0.8 K/uL     Eosinophils Absolute 0.0 0.0 - 0.7 K/uL     Basophils Absolute 0.0 0.0 - 0.2 K/uL   Comprehensive Metabolic Panel     Collection Time: 06/11/21  4:15 PM   Result Value Ref Range     Sodium 138 135 - 144 mEq/L     Potassium 4.9 3.4 - 4.9 mEq/L     Chloride 94 (L) 95 - 107 mEq/L     CO2 41 (HH) 20 - 31 mEq/L     Anion Gap 3 (L) 9 - 15 mEq/L     Glucose 98 70 - 99 mg/dL     BUN 10 6 - 20 mg/dL     CREATININE 0.92 (H) 0.50 - 0.90 mg/dL     GFR Non- >60.0 >60     GFR  >60.0 >60     Calcium 9.4 8.5 - 9.9 mg/dL     Total Protein 7.1 6.3 - 8.0 g/dL     Albumin 3.8 3.5 - 4.6 g/dL     Total Bilirubin 0.6 0.2 - 0.7 mg/dL     Alkaline Phosphatase 95 40 - 130 U/L     ALT 13 0 - 33 U/L     AST 14 0 - 35 U/L     Globulin 3.3 2.3 - 3.5 g/dL   Troponin     Collection Time: 06/11/21  4:15 PM   Result Value Ref Range     Troponin <0.010 0.000 - 0.010 ng/mL   Brain Natriuretic Peptide     Collection Time: 06/11/21  4:15 PM   Result Value Ref Range     Pro-BNP 1,088 pg/mL   Protime-INR     Collection Time: 06/11/21  4:15 PM   Result Value Ref Range     Protime 14.2 12.3 - 14.9 sec     INR 1.1     POCT Arterial     Collection Time: 06/11/21  4:15 PM   Result Value Ref Range     POC Sodium 142 136 - 145 mEq/L     POC Potassium 4.5 3.5 - 5.1 mEq/L     POC Chloride 94 (L) 99 - 110 mEq/L     POC Glucose 103 60 - 115 mg/dl     POC Creatinine 1.0 0.6 - 1.1 mg/dL     GFR Non- 58 (A) >60     GFR  >60 >60     Calcium, Ion 1.22 1.12 - 1.32 mmol/L     pH, Arterial 7.319 (L) 7.350 - 7.450     pCO2, Arterial 82 (HH) 35 - 45 mm Hg     pO2, Arterial 54 (HH) 75 - 108 mm Hg     HCO3, Arterial 42.3 (H) 21.0 - 29.0 mmol/L     Base Excess, Arterial 16 (H) -3 - 3     O2 Sat, Arterial 82 (LL) 93 - 100 %     TCO2, Arterial 45 (H) 22 - 29     Lactate 1.13 0.40 - 2.00 mmol/L     POC Hematocrit 57 (H) 36 - 48 %     Hemoglobin 19.4 (H) 12.0 - 16.0 gm/dL     FIO2 3.000       Sample Type ART       Performed on SEE BELOW     D-Dimer, Quantitative     Collection Time: 06/11/21  4:15 PM   Result Value Ref Range     D-Dimer, Quant 0.69 (HH) 0.00 - 0.50 mg/L FEU   Hemoglobin A1C     Collection Time: 06/11/21  4:15 PM   Result Value Ref Range     Hemoglobin A1C 5.5 4.8 - 5.9 %   EKG 12 Lead - Chest Pain     Collection Time: 06/11/21  4:15 PM   Result Value Ref Range     Ventricular Rate 89 BPM     Atrial Rate 89 BPM     P-R Interval 144 ms     QRS Duration 76 ms     Q-T Interval 374 ms     QTc Calculation (Bazett) 455 ms     P Axis 43 degrees     R Axis -3 degrees     T Axis -7 degrees   POCT Glucose     Collection Time: 06/11/21  7:44 PM   Result Value Ref Range     POC Glucose 94 60 - 115 mg/dl     Performed on ACCU-CHEK     POCT Arterial     Collection Time: 06/12/21  5:52 AM   Result Value Ref Range     POC Sodium 145 136 - 145 mEq/L     POC Potassium 4.5 3.5 - 5.1 mEq/L     POC Chloride 88 (L) 99 - 110 mEq/L     POC Glucose 110 60 - 115 mg/dl     POC Creatinine 1.1 0.6 - 1.1 mg/dL     GFR Non- 52 (A) >60     GFR  >60 >60     Calcium, Ion 1.22 1.12 - 1.32 mmol/L     pH, Arterial 7.270 (L) 7.350 - 7.450     pCO2, Arterial 113 (HH) 35 - 45 mm Hg     pO2, Arterial 63 (HH) 75 - 108 mm Hg     HCO3, Arterial 52.1 (H) 21.0 - 29.0 mmol/L     Base Excess, Arterial 25 (H) -3 - 3     O2 Sat, Arterial 85 (LL) 93 - 100 %     TCO2, Arterial >50 (H) 22 - 29     Lactate 0.58 0.40 - 2.00 mmol/L     POC Hematocrit 57 (H) 36 - 48 %     Hemoglobin 19.3 (H) 12.0 - 16.0 gm/dL     FIO2 50.000       Sample Type ART       Performed on SEE BELOW     POCT Arterial     Collection Time: 06/12/21  6:06 AM   Result Value Ref Range     POC Sodium 145 136 - 145 mEq/L     POC Potassium 4.2 3.5 - 5.1 mEq/L     POC Chloride 89 (L) 99 - 110 mEq/L     POC Glucose 116 (H) 60 - 115 mg/dl     POC Creatinine 1.2 (H) 0.6 - 1.1 mg/dL     GFR Non- 47 (A) >60     GFR  56 (A) >60     Calcium, Ion 1.23 1.12 - 1.32 mmol/L     pH, Arterial 7.289 (L) 7.350 - 7.450     pCO2, Arterial 108 (HH) 35 - 45 mm Hg     pO2, Arterial 67 (HH) 75 - 108 mm Hg     HCO3, Arterial 51.8 (H) 21.0 - 29.0 mmol/L     Base Excess, Arterial 25 (H) -3 - 3     O2 Sat, Arterial 88 (LL) 93 - 100 %     TCO2, Arterial >50 (H) 22 - 29     Lactate 0.57 0.40 - 2.00 mmol/L     POC Hematocrit 59 (H) 36 - 48 %     Hemoglobin 20.1 (HH) 12.0 - 16.0 gm/dL     FIO2 50.000       Sample Type ART       Performed on SEE BELOW     POCT Arterial     Collection Time: 06/12/21  6:20 AM   Result Value Ref Range     POC Sodium 146 (H) 136 - 145 mEq/L     POC Potassium 4.2 3.5 - 5.1 mEq/L     POC Chloride 89 (L) 99 - 110 mEq/L     POC Glucose 118 (H) 60 - 115 mg/dl     POC Creatinine 1.3 (H) 0.6 - 1.1 mg/dL     GFR Non- 43 (A) >60     GFR  51 (A) >60     Calcium, Ion 1.16 1.12 - 1.32 mmol/L     pH, Arterial 7.300 (L) 7.350 - 7.450     pCO2, Arterial 114 (HH) 35 - 45 mm Hg     pO2, Arterial 71 (HH) 75 - 108 mm Hg     HCO3, Arterial 56.3 (H) 21.0 - 29.0 mmol/L     Base Excess, Arterial 30 (H) -3 - 3     O2 Sat, Arterial 90 (HH) 93 - 100 %     TCO2, Arterial >50 (H) 22 - 29     Lactate 0.55 0.40 - 2.00 mmol/L     POC Hematocrit 59 (H) 36 - 48 %     Hemoglobin 20.0 (H) 12.0 - 16.0 gm/dL     FIO2 further questions.

## 2021-06-14 NOTE — CARE COORDINATION
Definition of CHF discussed with patient. Symptoms of heart failure and decompensation discussed. Weight gain of >3 #, edema, difficulty breathing, cough, issues with appetite, fatigue, or difficulty with sleep. Causes of CHF reviewed. CAD, MI, HTN, valve dz., infection,  ETOH or drug abuse, or genetic problems. Importance of daily weight and B/P monitoring discussed. Pt to use a calender or notebook to record daily weight. Low sodium diet and fluid intake discussed. Pt understands a fluid restriction. Shown how to read labels for sodium levels, recommended food list provided. Importance of following her physician orders for medications reinforced. Importance of flu and pneumonia vaccinations reinforced. Pt refuses vaccination due to personal reasons. Common CHF medications reviewed as well as avoiding certain other meds (decongestants, NSAIDS)  Instructed to discuss activity recommendations with physician. Pt. currently smoking. Smoking cessation discussed with patient. 1-800-quit-now number provided. Sample CHF weight documentation form provided. CHF Zones discussed. Importance of staying in \"green\" area stressed. Pt verbalized understanding to call MD ASAP when she reaches the yellow zone, and to call 911 when reaching the red zone. Booklet and zone pamphlet provided to the pt. Patient denies any further questions at this time. Plan is for a heart cath tomorrow with Dr. Laxmi Sheth.    Electronically signed by Kyara Rao RN on 6/14/2021 at 2:42 PM

## 2021-06-15 ENCOUNTER — APPOINTMENT (OUTPATIENT)
Dept: CARDIAC CATH/INVASIVE PROCEDURES | Age: 56
DRG: 286 | End: 2021-06-15
Payer: MEDICARE

## 2021-06-15 VITALS
BODY MASS INDEX: 48.82 KG/M2 | HEIGHT: 65 IN | DIASTOLIC BLOOD PRESSURE: 62 MMHG | WEIGHT: 293 LBS | SYSTOLIC BLOOD PRESSURE: 131 MMHG | HEART RATE: 74 BPM | TEMPERATURE: 97.9 F | RESPIRATION RATE: 23 BRPM | OXYGEN SATURATION: 97 %

## 2021-06-15 LAB
ANION GAP SERPL CALCULATED.3IONS-SCNC: 7 MEQ/L (ref 9–15)
BASOPHILS ABSOLUTE: 0 K/UL (ref 0–0.2)
BASOPHILS RELATIVE PERCENT: 0.5 %
BUN BLDV-MCNC: 14 MG/DL (ref 6–20)
CALCIUM SERPL-MCNC: 9.3 MG/DL (ref 8.5–9.9)
CHLORIDE BLD-SCNC: 91 MEQ/L (ref 95–107)
CHOLESTEROL, TOTAL: 138 MG/DL (ref 0–199)
CO2: 40 MEQ/L (ref 20–31)
CREAT SERPL-MCNC: 0.78 MG/DL (ref 0.5–0.9)
EOSINOPHILS ABSOLUTE: 0.1 K/UL (ref 0–0.7)
EOSINOPHILS RELATIVE PERCENT: 1.7 %
GFR AFRICAN AMERICAN: >60
GFR NON-AFRICAN AMERICAN: >60
GLUCOSE BLD-MCNC: 128 MG/DL (ref 60–115)
GLUCOSE BLD-MCNC: 85 MG/DL (ref 70–99)
GLUCOSE BLD-MCNC: 87 MG/DL (ref 60–115)
GLUCOSE BLD-MCNC: 95 MG/DL (ref 60–115)
HCT VFR BLD CALC: 49.6 % (ref 37–47)
HDLC SERPL-MCNC: 50 MG/DL (ref 40–59)
HEMOGLOBIN: 16 G/DL (ref 12–16)
INR BLD: 1.1
LDL CHOLESTEROL CALCULATED: 65 MG/DL (ref 0–129)
LYMPHOCYTES ABSOLUTE: 1.3 K/UL (ref 1–4.8)
LYMPHOCYTES RELATIVE PERCENT: 17 %
MCH RBC QN AUTO: 31.5 PG (ref 27–31.3)
MCHC RBC AUTO-ENTMCNC: 32.3 % (ref 33–37)
MCV RBC AUTO: 97.5 FL (ref 82–100)
MONOCYTES ABSOLUTE: 0.6 K/UL (ref 0.2–0.8)
MONOCYTES RELATIVE PERCENT: 7.7 %
NEUTROPHILS ABSOLUTE: 5.8 K/UL (ref 1.4–6.5)
NEUTROPHILS RELATIVE PERCENT: 73.1 %
PDW BLD-RTO: 16.3 % (ref 11.5–14.5)
PERFORMED ON: ABNORMAL
PERFORMED ON: NORMAL
PERFORMED ON: NORMAL
PLATELET # BLD: 173 K/UL (ref 130–400)
POTASSIUM REFLEX MAGNESIUM: 3.8 MEQ/L (ref 3.4–4.9)
PROTHROMBIN TIME: 14 SEC (ref 12.3–14.9)
RBC # BLD: 5.08 M/UL (ref 4.2–5.4)
SODIUM BLD-SCNC: 138 MEQ/L (ref 135–144)
TRIGL SERPL-MCNC: 114 MG/DL (ref 0–150)
WBC # BLD: 7.9 K/UL (ref 4.8–10.8)

## 2021-06-15 PROCEDURE — 2500000003 HC RX 250 WO HCPCS

## 2021-06-15 PROCEDURE — C1769 GUIDE WIRE: HCPCS

## 2021-06-15 PROCEDURE — 94640 AIRWAY INHALATION TREATMENT: CPT

## 2021-06-15 PROCEDURE — APPSS30 APP SPLIT SHARED TIME 16-30 MINUTES: Performed by: PHYSICIAN ASSISTANT

## 2021-06-15 PROCEDURE — 2709999900 HC NON-CHARGEABLE SUPPLY

## 2021-06-15 PROCEDURE — 99233 SBSQ HOSP IP/OBS HIGH 50: CPT | Performed by: INTERNAL MEDICINE

## 2021-06-15 PROCEDURE — 6360000002 HC RX W HCPCS: Performed by: INTERNAL MEDICINE

## 2021-06-15 PROCEDURE — 85025 COMPLETE CBC W/AUTO DIFF WBC: CPT

## 2021-06-15 PROCEDURE — 6360000004 HC RX CONTRAST MEDICATION: Performed by: INTERNAL MEDICINE

## 2021-06-15 PROCEDURE — 99232 SBSQ HOSP IP/OBS MODERATE 35: CPT | Performed by: INTERNAL MEDICINE

## 2021-06-15 PROCEDURE — C1894 INTRO/SHEATH, NON-LASER: HCPCS

## 2021-06-15 PROCEDURE — 4A023N7 MEASUREMENT OF CARDIAC SAMPLING AND PRESSURE, LEFT HEART, PERCUTANEOUS APPROACH: ICD-10-PCS | Performed by: INTERNAL MEDICINE

## 2021-06-15 PROCEDURE — 6370000000 HC RX 637 (ALT 250 FOR IP): Performed by: INTERNAL MEDICINE

## 2021-06-15 PROCEDURE — B2111ZZ FLUOROSCOPY OF MULTIPLE CORONARY ARTERIES USING LOW OSMOLAR CONTRAST: ICD-10-PCS | Performed by: INTERNAL MEDICINE

## 2021-06-15 PROCEDURE — 93458 L HRT ARTERY/VENTRICLE ANGIO: CPT | Performed by: INTERNAL MEDICINE

## 2021-06-15 PROCEDURE — 2500000003 HC RX 250 WO HCPCS: Performed by: NURSE PRACTITIONER

## 2021-06-15 PROCEDURE — 36415 COLL VENOUS BLD VENIPUNCTURE: CPT

## 2021-06-15 PROCEDURE — 2500000003 HC RX 250 WO HCPCS: Performed by: INTERNAL MEDICINE

## 2021-06-15 PROCEDURE — 6370000000 HC RX 637 (ALT 250 FOR IP): Performed by: NURSE PRACTITIONER

## 2021-06-15 PROCEDURE — 97165 OT EVAL LOW COMPLEX 30 MIN: CPT | Performed by: OCCUPATIONAL THERAPIST

## 2021-06-15 PROCEDURE — 2700000000 HC OXYGEN THERAPY PER DAY

## 2021-06-15 PROCEDURE — 80061 LIPID PANEL: CPT

## 2021-06-15 PROCEDURE — 2580000003 HC RX 258: Performed by: NURSE PRACTITIONER

## 2021-06-15 PROCEDURE — 85610 PROTHROMBIN TIME: CPT

## 2021-06-15 PROCEDURE — 94761 N-INVAS EAR/PLS OXIMETRY MLT: CPT

## 2021-06-15 PROCEDURE — 80048 BASIC METABOLIC PNL TOTAL CA: CPT

## 2021-06-15 PROCEDURE — 2580000003 HC RX 258

## 2021-06-15 PROCEDURE — 6360000002 HC RX W HCPCS

## 2021-06-15 RX ORDER — SODIUM CHLORIDE 9 MG/ML
INJECTION, SOLUTION INTRAVENOUS CONTINUOUS
Status: DISCONTINUED | OUTPATIENT
Start: 2021-06-15 | End: 2021-06-15 | Stop reason: HOSPADM

## 2021-06-15 RX ORDER — DIPHENHYDRAMINE HCL 25 MG
50 TABLET ORAL ONCE
Status: DISCONTINUED | OUTPATIENT
Start: 2021-06-15 | End: 2021-06-15 | Stop reason: HOSPADM

## 2021-06-15 RX ORDER — LABETALOL HYDROCHLORIDE 5 MG/ML
10 INJECTION, SOLUTION INTRAVENOUS EVERY 30 MIN PRN
Status: DISCONTINUED | OUTPATIENT
Start: 2021-06-15 | End: 2021-06-15 | Stop reason: HOSPADM

## 2021-06-15 RX ORDER — HYDRALAZINE HYDROCHLORIDE 20 MG/ML
10 INJECTION INTRAMUSCULAR; INTRAVENOUS EVERY 10 MIN PRN
Status: DISCONTINUED | OUTPATIENT
Start: 2021-06-15 | End: 2021-06-15 | Stop reason: HOSPADM

## 2021-06-15 RX ORDER — PREDNISONE 50 MG/1
50 TABLET ORAL ONCE
Status: DISCONTINUED | OUTPATIENT
Start: 2021-06-15 | End: 2021-06-15 | Stop reason: HOSPADM

## 2021-06-15 RX ORDER — SODIUM CHLORIDE 9 MG/ML
INJECTION, SOLUTION INTRAVENOUS
Status: DISCONTINUED
Start: 2021-06-15 | End: 2021-06-15 | Stop reason: HOSPADM

## 2021-06-15 RX ORDER — CARVEDILOL 3.12 MG/1
3.12 TABLET ORAL 2 TIMES DAILY WITH MEALS
Qty: 60 TABLET | Refills: 3 | Status: SHIPPED | OUTPATIENT
Start: 2021-06-15 | End: 2021-12-10

## 2021-06-15 RX ORDER — POTASSIUM CHLORIDE 750 MG/1
20 TABLET, EXTENDED RELEASE ORAL DAILY
Qty: 60 TABLET | Refills: 2 | Status: SHIPPED | OUTPATIENT
Start: 2021-06-15 | End: 2022-02-15 | Stop reason: SDUPTHER

## 2021-06-15 RX ORDER — FUROSEMIDE 40 MG/1
40 TABLET ORAL 2 TIMES DAILY
Qty: 60 TABLET | Refills: 5 | Status: SHIPPED | OUTPATIENT
Start: 2021-06-15 | End: 2021-12-07 | Stop reason: DRUGHIGH

## 2021-06-15 RX ORDER — LOSARTAN POTASSIUM 25 MG/1
25 TABLET ORAL DAILY
Qty: 30 TABLET | Refills: 3 | Status: SHIPPED | OUTPATIENT
Start: 2021-06-16 | End: 2021-12-07 | Stop reason: SDUPTHER

## 2021-06-15 RX ADMIN — ACETAMINOPHEN 650 MG: 325 TABLET ORAL at 17:19

## 2021-06-15 RX ADMIN — IOPAMIDOL 60 ML: 612 INJECTION, SOLUTION INTRAVENOUS at 14:54

## 2021-06-15 RX ADMIN — FUROSEMIDE 40 MG: 10 INJECTION, SOLUTION INTRAMUSCULAR; INTRAVENOUS at 17:19

## 2021-06-15 RX ADMIN — MICONAZOLE NITRATE: 2 POWDER TOPICAL at 11:51

## 2021-06-15 RX ADMIN — SODIUM CHLORIDE, PRESERVATIVE FREE 10 ML: 5 INJECTION INTRAVENOUS at 09:42

## 2021-06-15 RX ADMIN — LOSARTAN POTASSIUM 25 MG: 25 TABLET, FILM COATED ORAL at 09:41

## 2021-06-15 RX ADMIN — BUPROPION HYDROCHLORIDE 150 MG: 150 TABLET, EXTENDED RELEASE ORAL at 10:20

## 2021-06-15 RX ADMIN — FUROSEMIDE 40 MG: 10 INJECTION, SOLUTION INTRAMUSCULAR; INTRAVENOUS at 09:42

## 2021-06-15 RX ADMIN — DEXTROSE MONOHYDRATE 12.5 G: 25 INJECTION, SOLUTION INTRAVENOUS at 11:51

## 2021-06-15 RX ADMIN — IPRATROPIUM BROMIDE AND ALBUTEROL SULFATE 3 ML: .5; 3 SOLUTION RESPIRATORY (INHALATION) at 07:12

## 2021-06-15 RX ADMIN — CARVEDILOL 3.12 MG: 3.12 TABLET, FILM COATED ORAL at 09:41

## 2021-06-15 RX ADMIN — IPRATROPIUM BROMIDE AND ALBUTEROL SULFATE 3 ML: .5; 3 SOLUTION RESPIRATORY (INHALATION) at 12:15

## 2021-06-15 RX ADMIN — CARVEDILOL 3.12 MG: 3.12 TABLET, FILM COATED ORAL at 17:19

## 2021-06-15 ASSESSMENT — ENCOUNTER SYMPTOMS
SHORTNESS OF BREATH: 1
COLOR CHANGE: 0
NAUSEA: 0
VOMITING: 0
CHEST TIGHTNESS: 0

## 2021-06-15 ASSESSMENT — PAIN SCALES - GENERAL: PAINLEVEL_OUTOF10: 4

## 2021-06-15 NOTE — PROGRESS NOTES
Progress Note  Patient: Lavon Cea  Unit/Bed: C544/M395-75  YOB: 1965  MRN: [id number]  Acct: [de-identified]   Admitting Diagnosis: Hypoxia [R09.02]  Date:  6/11/2021  Hospital Day: 4    Chief Complaint:  SOB    Subjective    6/15/21: Sitting at sink washing up and in no acute distress. Shortness of breath improving. Diuresing well on Lasix 40 mg IV twice daily with approximately 8 L negative fluid balance since admission. Denies chest pain. Awaiting cardiac catheterization this afternoon to rule underlying cardiac ischemia given multiple risk factors for CAD, abnormal EKG and acute congestive heart failure. A.m. labs reviewed. Renal function electrolytes stable. Hemoglobin stable at 16.0. Hemodynamically stable. On telemetry she is maintaining sinus rhythm with heart rate in the 70s. 6/14/21: Resting quietly in bed in no acute distress. On 4 L O2 per nasal cannula at this time. Patient states she normally wears 3 L O2 continuous at home. Shortness of breath improving. Denies chest pain. Complaining of bilateral lower extremity pain. Hemodynamically stable. On telemetry she is maintaining sinus rhythm with heart rate in the 70s. Telemetry alarms reviewed and patient noted to have 5 beat episode of PSVT at 1717 on 6/13/2021. She reports having a history of high blood pressure dyslipidemia but is noncompliant with medications. Also reports a history of obstructive sleep apnea but states that she is not compliant with BiPAP/CPAP but she lost it a long time ago. She is diuresing well on Lasix 40 mg IV twice daily with approximately 7400 mL negative fluid balance since admission. 6/13/21: Sitting up in chair. Denies any chest pain. Patient does admit occasional chest pressure over the past few months. History of negative stress test in 2020 per patient. -6.9 L total net fluid balance.   Renal function is stable     6/12/21: Patient is a 55 y.o. female who presents with a chief complaint of SOB. Patient is followed on a regular basis by Dr. Narciso Eli PA-C. Patient with multiple medical problems including chronic diastolic congestive heart failure, COPD, hypertension, hyperlipidemia, morbid obesity, likely KARISSA. Presented with worsening shortness of breath as well as increased lower extremity edema. Patient last seen in our office in February 2020 where she was supposed to undergo nuclear stress as well as echocardiogram but failed to do so. Patient does have a history of noncompliance. She denies any chest pain chest pressure heaviness. Pulmonary following for acute exacerbation COPD. Patient is currently on BiPAP. Initial cardiac enzyme is negative, BNP is elevated at thousand 88. She denies any history of myocardial infarction or arrhythmia. Denies history of stress test or cardiac catheterization. EKG with normal sinus rhythm, poor R wave progression across the cardial leads, T wave inversion in inferior leads. Review of Systems:   Review of Systems   Constitutional: Negative for activity change and fever. HENT: Negative for congestion. Respiratory: Positive for shortness of breath (improving). Negative for chest tightness. Cardiovascular: Positive for leg swelling. Negative for chest pain and palpitations. Gastrointestinal: Negative for nausea and vomiting. Genitourinary: Negative for difficulty urinating. Musculoskeletal: Negative for arthralgias. Skin: Negative for color change, pallor and rash. Neurological: Negative for dizziness and syncope. Psychiatric/Behavioral: Negative for agitation and behavioral problems. Physical Examination:    /64   Pulse 79   Temp 98.4 °F (36.9 °C) (Oral)   Resp 20   Ht 5' 5\" (1.651 m)   Wt (!) 336 lb (152.4 kg)   LMP  (LMP Unknown)   SpO2 92%   BMI 55.91 kg/m²    Physical Exam  Constitutional:       General: She is not in acute distress. Appearance: Normal appearance.    HENT:      Head: 05/26/2020    PROT 7.1 06/11/2021    LABALBU 3.8 06/11/2021    CALCIUM 9.3 06/15/2021    BILITOT 0.6 06/11/2021    ALKPHOS 95 06/11/2021    AST 14 06/11/2021    ALT 13 06/11/2021     BMP:    Lab Results   Component Value Date     06/15/2021    K 3.8 06/15/2021    CL 91 06/15/2021    CO2 40 06/15/2021    BUN 14 06/15/2021    LABALBU 3.8 06/11/2021    CREATININE 0.78 06/15/2021    CALCIUM 9.3 06/15/2021    GFRAA >60.0 06/15/2021    LABGLOM >60.0 06/15/2021    GLUCOSE 85 06/15/2021    GLUCOSE 57 05/26/2020     Magnesium:    Lab Results   Component Value Date    MG 2.2 09/04/2013     Troponin:    Lab Results   Component Value Date    TROPONINI <0.010 06/11/2021       Radiology:  No results found. Echocardiogram 6/12/21:   Conclusions      Summary   Technically difficult examination. Normal left ventricular systolic function, no regional wall motion   abnormalities, estimated ejection fraction of 55%. Normal left ventricular size and function. Mild concentric left ventricular hypertrophy. Impaired relaxation compatible with diastolic dysfunction. ( reversed E/A   ratio)   No evidence of mitral regurgitation. No evidence of mitral valve stenosis. No evidence of aortic valve regurgitation . No evidence of aortic valve stenosis. There is Trace tricuspid regurgitation with estimated RVSP of 38 mm Hg.       Signature      ----------------------------------------------------------------   Electronically signed by Darrian Net.DO(Interpreting   physician) on 06/13/2021 11:12 PM        EKG 6/11/21: SR 89, ST depression with T wave inversion leads III and aVF, poor R wave progression V2-V6, QTc 455ms    Telemetry 6/14/21: SR 70s; 5 beat PSVT at 17:17 on 6/13/21  Telemetry 6/15/21: SR 70s      Assessment:    Active Hospital Problems    Diagnosis Date Noted    Abnormal EKG [R94.31]      Priority: High    Acute on chronic diastolic heart failure (HCC) [I50.33]      Priority: High    Hypoxia [R09.02] 06/19/2020     1. Dyspnea--likely multifactorial secondary to decompensated diastolic CHF +/- COPD +/- obesity hypoventilation syndrome vs other  2. Abnormal EKG  3. Morbid obesity  4. HTN  5. DM  6. Normal LVF EF 55% per echo 6/12/21  7. Mild PHTN with RVSP 38mmHg  8. Tobacco abuse  9. PSVT       Plan:  1. Maximize medical therapy-Coreg 3.125 mg p.o. twice daily, losartan 25 mg p.o. daily, Lipitor 10 mg p.o. daily, IV diuresis as tolerated-currently with Lasix 40 mg IV twice daily, insulin as directed  2. Cardiac/diabetic/less than 2 g sodium diet recommended  3. Check daily weight and strict intake and output  4. Monitor on telemetry for any tachycardia or bradycardia arrhythmias  5. Maintain potassium greater than 4, magnesium greater than 2  6. Weight loss recommended  7. Tobacco cessation strongly recommended  8. Coronary evaluation per Dr. Christianne Mata for cardiac catheterization this afternoon to rule out underlying cardiac ischemia given abnormal EKG, acute congestive heart failure multiple risk factors for CAD  9. Pulmonology recommendations  10. Internal medicine recommendations  11. Recommend outpatient follow-up with CHF clinic upon discharge  12. Further recommendations to follow          Electronically signed by DEREK Chan on 6/15/2021 at 10:29 AM      Attending Supervising [de-identified] Attestation Statement  The patient is a 55 y.o. female. I have performed a history and physical examination of the patient. I discussed the case with the physician assistant. I reviewed the patient's Past Medical History, Past Surgical History, Medications, and Allergies.      Physical Exam:  Vitals:    06/15/21 1617 06/15/21 1618 06/15/21 1619 06/15/21 1705   BP:    131/62   Pulse: 72 73 72 74   Resp: 23 22 23    Temp:    97.9 °F (36.6 °C)   TempSrc:       SpO2: 91% 91% 91% 97%   Weight:       Height:           Pulmonary/Chest: decreased breath sounds noted  Cardiovascular: normal rate, normal S1 and S2, no gallops, intact distal pulses and no carotid bruits  Abdomen: soft, non-tender, non-distended, normal bowel sounds, no masses or organomegaly    Active Hospital Problems    Diagnosis Date Noted    Abnormal EKG [R94.31]      Priority: High    Acute on chronic diastolic heart failure (HCC) [I50.33]      Priority: High    Hypoxia [R09.02] 06/19/2020     Priority: Low        I reviewed and agree with the findings and plan documented in her note . ASSESSMENT:     Shortness of breath-multifactorial secondary to acute on chronic decompensated diastolic congestive heart failure, acute exacerbation COPD, obesity hypopnea syndrome, pulmonary hypertension, obstructive sleep apnea.     Abnormal EKG     Likely KARISSA     Morbid obesity     History of medical noncompliance     Essential hypertension     Mixed hyperlipidemia     Multiple medical problems        PLAN:   1. As always, aggressive risk factor modification is strongly recommended. We should adhere to the JNC VIII guidelines for HTN management and the NCEPATP III guidelines for LDL-C management. 2. CATH LATER TODAY. 3. IV diuretics as tolerated.  Monitor I's and O's and renal function electrolytes.  Keep potassium greater than 4, magnesium greater than 2  4. Continue to monitor on telemetry  5. CHF teaching  6. CHF clinic follow-up post discharge.  Patient with high risk of readmission  7. Pulmonary recommendations  8.  Weight loss recommended               Electronically signed by DO Elle on 6/21/21 at 9:00 AM EDT

## 2021-06-15 NOTE — CARE COORDINATION
CALL PLACED TO ON CALL  [facility]. AWAITING CALL BACK. CALL BACK FROM [employer] AND IS NOT CURRENT WITH THEM FOR 02. CALL TO [facility]IZ AT [facility] AND PT IS CURRENT WITH THEM. ELIZ STATES TO GIVE PT A TANK AND WILL HAVE THE OFFICE CALL HER TOMORROW. ELIZ IS ALSO LOOKING INTO GETTING BIPAP/TRILOGY FOR PT FROM ORDER SHE RECIEVED FROM DR Dave Esteves.  TANK GIVEN TO RN AND INSTRUCTED TO HAVE PT CALL TO GET POC SERVICED

## 2021-06-15 NOTE — PROGRESS NOTES
Outer band removed from the right radial site, No bleeding or hematoma noted at this time. VSS, patient tolerates PO intake without difficulty.

## 2021-06-15 NOTE — PROGRESS NOTES
[facility] OCCUPATIONAL THERAPY EVALUATION - ACUTE     NAME: Austen Phillips  : 1965 (55 y.o.)  MRN: [id number]  CODE STATUS: Full Code  [facility]    Date of Service: 6/15/2021    Patient Diagnosis(es): Hypoxia [R09.02]   Chief Complaint   Patient presents with    Shortness of Breath     Patient Active Problem List    Diagnosis Date Noted    Abnormal EKG     Acute on chronic diastolic heart failure ([facility]     Dysmetabolic syndrome 01/10/5407    Prediabetes  2021    Cellulitis of both lower extremities 2021    Dental caries 2021    Urge incontinence of urine 2021    Hypoxia 2020    Intertrigo 2020    Osteoarthritis of knee 2020    Malaise and fatigue 2020    Hypertensive heart disease with heart failure ([facility] 2020    Generalized anxiety disorder 2020    Gastro-esophageal reflux disease without esophagitis 2020    Chronic obstructive pulmonary disease ([facility] 2020    Cervical disc disorder 2020    Vitamin D deficiency 2020    Depressed mood 2020    Coronary artery disease involving native coronary artery of native heart 2020    Weight gain 2020    Essential hypertension 02/15/2018    LVH (left ventricular hypertrophy)     Diastolic dysfunction     Bilateral leg edema 2013    KARISSA (obstructive sleep apnea) 2013    SOB (shortness of breath) 2013    Tobacco abuse 2013    Tobacco abuse counseling 2013    BMI 50.0-59.9, adult [facility] 2013        Past Medical History:   Diagnosis Date    Anxiety     Asthma     [facility]     Chronic back pain     COPD [facility]chronic obstructive pulmonary disease) [facility]     Depression     Hypertension     Obesity     Pelvic pain in female 2/15/2018    Type 2 diabetes mellitus without complication ([facility] 3/6/5290     Past Surgical History:   Procedure Laterality Date    BLADDER SUSPENSION  02/2014    CARPAL TUNNEL RELEASE  89 or 90    right hand     HYSTERECTOMY  2009    full    [facility] CMBND ANTERPOST COLPORRAPHY W/CYSTO W/NTRCL RPR N/A 2/19/2018    EXCISION OF VAGINAL MESH, CYSTOSCOPY performed by Umair Lay DO at [address]      27 yrs ago        Restrictions  Restrictions/Precautions: Fall Risk     Safety Devices: Safety Devices  Safety Devices in place: Yes  Type of devices:  All fall risk precautions in place        Subjective  Pre Treatment Pain Screening  Pain at present: 0  Scale Used: Numeric Score  Intervention List: Patient able to continue with treatment    Pain Reassessment:   Pain Assessment  Patient Currently in Pain: No  Pain Assessment: 0-10       Prior Level of Function:  Social/Functional History  Lives With: Family, Significant other  Type of Home: House  Home Layout: Two level, Able to Live on Main level with bedroom/bathroom, Laundry in basement  Home Access: Stairs to enter with rails  Entrance Stairs - Number of Steps: 3-4 OLIVER  Entrance Stairs - Rails: Left  Bathroom Shower/Tub: Tub/Shower unit, Curtain  Bathroom Toilet: Standard  Bathroom Equipment: Hand-held shower, 3-in-1 commode  Home Equipment: Hospital bed, Oxygen  ADL Assistance: Independent  Homemaking Assistance: Independent  Ambulation Assistance: Independent  Transfer Assistance: Independent  Active : Yes  Mode of Transportation: Car  Occupation: On disability  Leisure & Hobbies: [employer]    OBJECTIVE:     Orientation Status:  Orientation  Overall Orientation Status: Within Normal Limits    Observation:  Observation/Palpation  Posture: Good  Observation: no acute distress    Cognition Status:  Cognition  Overall Cognitive Status: WFL    Perception Status:  Perception  Overall Perceptual Status: WFL    Sensation Status:  Sensation  Overall Sensation Status: WFL    Vision and Hearing Status:  Vision  Vision: Impaired  Vision Exceptions: Wears glasses at all times  Hearing  Hearing: Within functional limits     ROM:   LUE AROM (degrees)  LUE AROM : WFL  Left Hand AROM (degrees)  Left Hand AROM: WFL  RUE AROM (degrees)  RUE AROM : WFL  Right Hand AROM (degrees)  Right Hand AROM: WFL    Strength:  LUE Strength  Gross LUE Strength: WFL  L Hand General: 4-/5  LUE Strength Comment: LUE throughout 3+/5  RUE Strength  Gross RUE Strength: WFL  R Hand General: 4-/5  RUE Strength Comment: RUE throughout 3+/5    Coordination, Tone, Quality of Movement: Tone RUE  RUE Tone: Normotonic  Tone LUE  LUE Tone: Normotonic  Coordination  Movements Are Fluid And Coordinated: Yes    Hand Dominance:  Hand Dominance  Hand Dominance: Right    ADL Status:  ADL  Feeding: Independent  Grooming: Independent  UE Bathing: Independent  LE Bathing: Independent  UE Dressing: Independent  LE Dressing: Independent  Toileting: Independent  Additional Comments: simulated ADLs as above  Toilet Transfers  Toilet - Technique: Ambulating  Equipment Used: Extra wide bedside commode  Toilet Transfer: Independent       Therapy key for assistance levels -   Independent = Pt. is able to perform task with no assistance but may require a device   Stand by assistance = Pt. does not perform task at an independent level but does not need physical assistance, requires verbal cues  Minimal, Moderate, Maximal Assistance = Pt. requires physical assistance (25%, 50%, 75% assist from helper) for task but is able to actively participate in task   Dependent = Pt. requires total assistance with task and is not able to actively participate with task completion     Functional Mobility:  Functional Mobility  Functional - Mobility Device: No device  Assist Level: Independent  Transfers  Sit to stand: Independent  Stand to sit:  Independent    Bed Mobility  Bed mobility  Rolling to Left: Independent  Rolling to Right: Independent  Supine to Sit: Independent  Sit to Supine: Independent    Seated and Standing Balance:  Balance  Sitting

## 2021-06-15 NOTE — PROGRESS NOTES
Arrived to pre/post from the cath lab and report from [address] to right wrist intact with no bleeding or hematoma. Attached to monitor and vitals are stable.   Taking food and fluids

## 2021-06-15 NOTE — PROGRESS NOTES
[id number] in the last 72 hours. O2 Device: Nasal cannula  O2 Flow Rate (L/min): 4 L/min    ADULT DIET;  Regular; Low Fat/Low Chol/High Fiber/LIZETTE     MEDICATIONS during current hospitalization:    Continuous Infusions:   sodium chloride      sodium chloride Stopped (06/15/21 1704)    sodium chloride      dextrose         Scheduled Meds:   predniSONE  50 mg Oral Once    diphenhydrAMINE  50 mg Oral Once    hydrocortisone sodium succinate PF  200 mg Intravenous Once    miconazole   Topical BID    furosemide  40 mg Intravenous BID    losartan  25 mg Oral Daily    carvedilol  3.125 mg Oral BID WC    buPROPion  150 mg Oral Daily    atorvastatin  10 mg Oral Nightly    sodium chloride flush  10 mL Intravenous 2 times per day    enoxaparin  40 mg Subcutaneous Daily    insulin lispro  0-12 Units Subcutaneous TID WC    insulin lispro  0-6 Units Subcutaneous Nightly    ipratropium-albuterol  1 vial Inhalation TID       PRN Meds:hydrALAZINE, labetalol, sodium chloride flush, sodium chloride, ondansetron **OR** ondansetron, polyethylene glycol, acetaminophen **OR** acetaminophen, glucose, dextrose, glucagon (rDNA), dextrose, senna, albuterol    Radiology  Echocardiogram complete 2D with doppler with color    Result Date: 6/13/2021  Transthoracic Echocardiography Report (TTE)  Demographics   Patient Name    Savanah Fishman Gender                Female   Patient Number  [id number]     Race                                                  Ethnicity   Visit Number    [id number]    Room Number           D917   Corporate ID                 Date of Study         06/12/2021   Accession       [id number]   Referring Physician  Number   Date of Birth   1965   Sonographer           Cyndi Dupree RDCS   Age             55 year(s)   Interpreting          [facility]) Cardiology                               Physician             Martha Ortiz, DO  Procedure Type of Study   TTE procedure:ECHO COMPLETE 2D W/DOP W/COLOR. Procedure Date Date: 06/12/2021 Start: 09:44 AM Study Location: Portable Technical Quality: Adequate visualization Indications:Congestive heart failure. Patient Status: Routine Height: 65 inches Weight: 350 pounds BSA: 2.51 m^2 BMI: 58.24 kg/m^2 BP: 146/84 mmHg  Conclusions   Summary  Technically difficult examination. Normal left ventricular systolic function, no regional wall motion  abnormalities, estimated ejection fraction of 55%. Normal left ventricular size and function. Mild concentric left ventricular hypertrophy. Impaired relaxation compatible with diastolic dysfunction. ( reversed E/A  ratio)  No evidence of mitral regurgitation. No evidence of mitral valve stenosis. No evidence of aortic valve regurgitation . No evidence of aortic valve stenosis. There is Trace tricuspid regurgitation with estimated RVSP of 38 mm Hg. Signature   ----------------------------------------------------------------  Electronically signed by Daniella Lopez DO(Interpreting  physician) on 06/13/2021 11:12 PM  ----------------------------------------------------------------   Findings  Left Ventricle Normal left ventricular systolic function, no regional wall motion abnormalities, estimated ejection fraction of 55%. Normal left ventricular size and function. Mild concentric left ventricular hypertrophy. Impaired relaxation compatible with diastolic dysfunction. ( reversed E/A ratio) Right Ventricle Normal right ventricle structure and function. Normal right ventricle systolic pressure. Left Atrium Normal left atrium. Right Atrium Normal right atrium. Mitral Valve Diffusely thickened and pliable mitral valve leaflets with normal excursion. No evidence of mitral regurgitation. No evidence of mitral valve stenosis. Tricuspid Valve Normal tricuspid valve structure and function. There is Trace tricuspid regurgitation with estimated RVSP of 38 mm Hg. Aortic Valve The aortic valve leaflets were not well visualized. No evidence of aortic valve regurgitation . No evidence of aortic valve stenosis. Pulmonic Valve Normal pulmonic valve structure and function. Pericardial Effusion No evidence of pericardial effusion. Pleural Effusion No evidence of pleural effusion. Aorta \ Miscellaneous Miscellaneous normal findings were found.  M-Mode Measurements (cm)   LVIDd: 5.4 cm             LVIDs: 4.17 cm  IVSd: 2.05 cm             IVSs: 2.45 cm  LVPWd: 1.26 cm            LVPWs: 1.46 cm                            AO Root Dimension: 3.98 cm                            ACS: 1.68 cm                            LVOT: 2.33 cm  Doppler Measurements:   AV Peak Gradient: 7.94 mmHg           MV Peak E-Wave: 0.43 m/s  AV Mean Gradient: 4.67 mmHg           MV Peak A-Wave: 0.48 m/s  TR Velocity:2.89 m/s  TR Gradient:33.49 mmHg                                        Estimated RAP:5 mmHg                                        RVSP:38.49 mmHg  Valves  Mitral Valve   Peak E-Wave: 0.43 m/s           Peak A-Wave: 0.48 m/s                                  E/A Ratio: 0.89                                  Peak Gradient: 0.74 mmHg                                  Deceleration Time: 126.8 msec   Aortic Valve   Peak Velocity: 1.41 m/s             Mean Velocity: 1.03 m/s  Peak Gradient: 7.94 mmHg            Mean Gradient: 4.67 mmHg  AV VTI: 26.84 cm   Cusp Separation: 1.68 cm   Tricuspid Valve   Estimated RVSP: 38.49 mmHg              Estimated RAP: 5 mmHg  TR Velocity: 2.89 m/s                   TR Gradient: 33.49 mmHg   Pulmonic Valve            Estimated PASP: 38.49 mmHg   LVOT   LVOT Diameter: 2.33 cm  Structures  Left Ventricle   Diastolic Dimension: 5.4 cm          Systolic Dimension: 8.84 cm  Septum Diastolic: 8.38 cm            Septum Systolic: 2.85 cm  PW Diastolic: 6.21 cm                PW Systolic: 0.22 cm                                       FS: 22.8 %  LV EDV/LV EDV Index: 141.3 ml/56 m^2 LV ESV/LV ESV Index: 77.42 ml/31 m^2  EF Calculated: 45.2 % LVOT Diameter: 2.33 cm   Right Atrium   RA Systolic Pressure: 5 mmHg   Right Ventricle            RV Systolic Pressure: 01.23 mmHg  Aorta/ Miscellaneous Aorta   Aortic Root: 3.98 cm  LVOT Diameter: 2.33 cm      XR CHEST (2 VW)    Result Date: 6/11/2021  XR CHEST (2 VW): 6/11/2021 5:38 PM CLINICAL HISTORY:  sob . COMPARISON: None available. TECHNIQUE: A portable upright AP radiograph of the chest was obtained. FINDINGS: The cardiac silhouette is at the upper limits of normal which may be secondary to cardiomegaly versus pericardial effusion. The aorta is ectatic which most commonly correlates with chronic hypertension. There are diffuse increased interstitial pulmonary markings throughout both lungs which may be secondary to pulmonary edema. The findings may reflect chronic congestive heart failure, CHF. .     CT HEAD WO CONTRAST    Result Date: 6/11/2021  EXAMINATION: CT HEAD WO CONTRAST, 6/11/2021 7:07 PM CLINICAL HISTORY:  falls and fatigue COMPARISON: None TECHNIQUE:  Multiple contiguous axial images of the head were obtained from the skull base through the skull vertex without intravenous contrast. Sagittal and coronal reformats have been produced. All CT scans at this facility use dose modulation, iterative reconstruction, and/or weight based dosing when appropriate to reduce radiation dose to as low as reasonably achievable. BRAIN CT FINDINGS: Gray-white matter differentiation is maintained. No acute hemorrhage, mass, mass effect, or midline shift. There is no evidence of atrophy, ventricular morphology is within normal limits. The subcortical and periventricular white matter is within normal limits. The basal ganglia are within normal limits. There are no acute changes or space-occupying lesions in the posterior fossa. There is crowding of the cerebellar tonsils at the foramen magnum. The visualized portions of the orbits are within normal limits. The globes are intact.  The imaged portions of the paranasal sinuses are unremarkable. The calvarium is intact. There is no acute intracranial process. Crowding of the cerebellar tonsils at the foramen magnum may indicate a Chiari I malformation. May consider elective MRI evaluation. CTA Chest W WO  (PE study)    Result Date: 6/11/2021  EXAM:CTA CHEST W WO CONTRAST DATE6/11/2021 7:07 PM: REASON FOR EXAM:Acute severe anterior chest wall pain. sob COMPARISON: none Technique: Helical CT was performed through the chest following the IV administration of100  cc of nonionic contrast. 3-D MIP reconstructions were performed on an independent workstation in the coronal plane with thick slab technique utilized in the interpretation. 3-D maximum intensity projection performed. All CT scans at this facility use dose modulation, iterative reconstruction, and/or weight based dosing when appropriate to reduce radiation dose to as low as reasonably achievable. CHEST CTA  FINDINGS: Mediastinum: Mediastinum and fortino are normal. There are no pathologically enlarged lymph nodes. The chest wall and lower neck are normal Heart: The heart is within normal limits. There is no pericardial effusion. Vascular structures: There is no evidence for thoracic aortic aneurysm or dissection. No evidence of traumatic aortic injury. There are no persistent filling defects within the pulmonary arteries. Lungs: There are no focal infiltrates or consolidations. There is no pneumothorax. There is dependent atelectasis in the posterior portions of the lung bases and there are areas of increased linear interstitial markings in both lungs and costophrenic recesses which may represent scarring from previous infection, inflammation. Pleura: No pleural effusion or thickening. Upper abdomen: The visualized portions of the upper abdomen are unremarkable.  Bones/axillae/soft tissues: Osseous structures and chest wall are normal.     Negative CTA of the chest. No evidence of thoracic aortic dissection or aneurysm. The study is negative for pulmonary emboli. There are no acute infiltrates or effusions. There are areas of increased linear interstitial markings in both lung bases which may represent residual scarring from prior infection, inflammation. IMPRESSION AND SUGGESTION:  1. Acute on chronic hypoxic and hypercapnic respiratory failure, on O2  2. Possible KARISSA  3. Polycythemia  4. Morbid obesity    Patient had ABG done shows pH 7.33 with PCO2 of 94 PO2 80 saturation is 94 bicarb of 50.4. Continue BiPAP therapy. Patient had a home sleep study done in the past shows mild sleep apnea. I discussed with Lisa Church from [address] [place] [address] she will look into the insurance if she qualify for trilogy. She has chronic respiratory acidosis and PCO2 was 94 and last ABG. She will benefit with noninvasive ventilator trilogy if she qualify. If she does not qualify for trilogy then she will go for sleep study. Continue O2 to keep saturation 90% above. Follow-up in office 2 weeks      NOTE: This report was transcribed using voice recognition software. Every effort was made to ensure accuracy; however, inadvertent computerized transcription errors may be present.       Electronically signed by Bianka Vargas MD, [facility] on 6/15/2021 at 5:37 PM

## 2021-06-15 NOTE — FLOWSHEET NOTE
Pt given IV dextrose for complains of feeling dizzy/low blood sugar. Glucose is 95.  Electronically signed by Shital Garcia RN on 6/15/2021 at 11:54 AM

## 2021-06-15 NOTE — PROGRESS NOTES
Department of Internal Medicine  General Internal Medicine  Attending Progress Note      SUBJECTIVE:  Pt seen and examined. Plan is for cardiac cath this afternoon. Pt hoping to go home after procedure.  Feels breathing is back to baseline    OBJECTIVE      Medications    Current Facility-Administered Medications: predniSONE (DELTASONE) tablet 50 mg, 50 mg, Oral, Once  diphenhydrAMINE (BENADRYL) tablet 50 mg, 50 mg, Oral, Once  hydrocortisone sodium succinate PF (SOLU-CORTEF) injection 200 mg, 200 mg, Intravenous, Once  miconazole (MICOTIN) 2 % powder, , Topical, BID  sodium chloride 0.9 % infusion, , ,   furosemide (LASIX) injection 40 mg, 40 mg, Intravenous, BID  losartan (COZAAR) tablet 25 mg, 25 mg, Oral, Daily  carvedilol (COREG) tablet 3.125 mg, 3.125 mg, Oral, BID WC  buPROPion (WELLBUTRIN XL) extended release tablet 150 mg, 150 mg, Oral, Daily  atorvastatin (LIPITOR) tablet 10 mg, 10 mg, Oral, Nightly  sodium chloride flush 0.9 % injection 10 mL, 10 mL, Intravenous, 2 times per day  sodium chloride flush 0.9 % injection 10 mL, 10 mL, Intravenous, PRN  0.9 % sodium chloride infusion, 25 mL, Intravenous, PRN  enoxaparin (LOVENOX) injection 40 mg, 40 mg, Subcutaneous, Daily  ondansetron (ZOFRAN-ODT) disintegrating tablet 4 mg, 4 mg, Oral, Q8H PRN **OR** ondansetron (ZOFRAN) injection 4 mg, 4 mg, Intravenous, Q6H PRN  polyethylene glycol (GLYCOLAX) packet 17 g, 17 g, Oral, Daily PRN  acetaminophen (TYLENOL) tablet 650 mg, 650 mg, Oral, Q6H PRN **OR** acetaminophen (TYLENOL) suppository 650 mg, 650 mg, Rectal, Q6H PRN  insulin lispro (HUMALOG) injection vial 0-12 Units, 0-12 Units, Subcutaneous, TID WC  insulin lispro (HUMALOG) injection vial 0-6 Units, 0-6 Units, Subcutaneous, Nightly  glucose (GLUTOSE) 40 % oral gel 15 g, 15 g, Oral, PRN  dextrose 50 % IV solution, 12.5 g, Intravenous, PRN  glucagon (rDNA) injection 1 mg, 1 mg, Intramuscular, PRN  dextrose 5 % solution, 100 mL/hr, Intravenous, PRN  senna (SENOKOT) tablet 8.6 mg, 1 tablet, Oral, Daily PRN  ipratropium-albuterol (DUONEB) nebulizer solution 3 mL, 1 vial, Inhalation, TID  albuterol (PROVENTIL) nebulizer solution 2.5 mg, 2.5 mg, Nebulization, Q2H PRN  Physical    VITALS:  /64   Pulse 79   Temp 98.4 °F (36.9 °C) (Oral)   Resp 20   Ht 5' 5\" (1.651 m)   Wt (!) 336 lb (152.4 kg)   LMP  (LMP Unknown)   SpO2 92%   BMI 55.91 kg/m²   Constitutional: Awake and alert in no acute distress. Sitting in chair comfortably  Head: Normocephalic, atraumatic  Eyes: EOMI, PERRLA  ENT: moist mucous membranes, nasal cannula  Neck: neck supple, trachea midline  Lungs: Good inspiratory effort, CTABL, no wheeze, no rhonchi, no rales  Heart: RRR, normal S1 and S2  GI: Soft, non-distended, non tender, no guarding, no rebound, +BS  MSK: 3+ pitting edema noted  Skin: warm, dry  Psych: appropriate affect     Data    CBC:   Lab Results   Component Value Date    WBC 7.9 06/15/2021    RBC 5.08 06/15/2021    HGB 16.0 06/15/2021    HCT 49.6 06/15/2021    MCV 97.5 06/15/2021    MCH 31.5 06/15/2021    MCHC 32.3 06/15/2021    RDW 16.3 06/15/2021     06/15/2021    MPV 9.0 02/21/2014     CMP:    Lab Results   Component Value Date     06/15/2021    K 3.8 06/15/2021    CL 91 06/15/2021    CO2 40 06/15/2021    BUN 14 06/15/2021    CREATININE 0.78 06/15/2021    GFRAA >60.0 06/15/2021    LABGLOM >60.0 06/15/2021    GLUCOSE 85 06/15/2021    GLUCOSE 57 05/26/2020    PROT 7.1 06/11/2021    LABALBU 3.8 06/11/2021    CALCIUM 9.3 06/15/2021    BILITOT 0.6 06/11/2021    ALKPHOS 95 06/11/2021    AST 14 06/11/2021    ALT 13 06/11/2021       ASSESSMENT AND PLAN      # Acute on chronic hypoxic and hypercapnic respiratory failure  - multifactorial between acute on chronic diastolic CHF, OHS and KARISSA  - breathing improving with diuresis. Over 7L fluid diuresed.    - continuing diuresis  - cardiology consulted- cardiac cath today  - pulm consulted - will need outpatient sleep study  - O2 as needed- on baseline 3L    # Hyperglycemia  - not on meds at home  - A1C 5.5. Pt is not diabetic. No need for home going meds  - ISS while admitted to monitor BG closely    # HTN/HLD  - cont home meds    DVT: lovenox    Disposition: Pt to have cardiac cath today. Will discharge when ok from cardiology stand, possibly after cath if negative.        Den Grover, DO  Internal Medicine

## 2021-06-16 ENCOUNTER — CARE COORDINATION (OUTPATIENT)
Dept: CASE MANAGEMENT | Age: 56
End: 2021-06-16

## 2021-06-16 DIAGNOSIS — I51.89 DIASTOLIC DYSFUNCTION: ICD-10-CM

## 2021-06-16 DIAGNOSIS — I11.0 HYPERTENSIVE HEART DISEASE WITH HEART FAILURE (HCC): ICD-10-CM

## 2021-06-16 DIAGNOSIS — I50.33 ACUTE ON CHRONIC DIASTOLIC HEART FAILURE (HCC): Primary | ICD-10-CM

## 2021-06-16 PROCEDURE — 1111F DSCHRG MED/CURRENT MED MERGE: CPT | Performed by: PHYSICIAN ASSISTANT

## 2021-06-16 NOTE — DISCHARGE SUMMARY
Physician Discharge Summary     Patient ID:  Paula Caraballo  [id number]  55 y.o.  1965    Admit date: 6/11/2021    Discharge date : 06/15/2021    Admitting Physician: Vamshi Loo DO     Discharge Physician: Den Grover DO     Admission Diagnoses: Hypoxia [R09.02]    Discharge Diagnoses: Acute on chronic hypoxic/hypercapnic respiratory failure 2/2 CHF, OHS, KARISSA    Admission Condition: fair    Discharged Condition: good    Hospital Course: Presented to the ED today after direction from her PCP for hypoxemia. At her out patient appointment she was found to have a sat in the 76s. On 3 L nasal cannula at her baseline O2 sat is 87%. When she is using her compressor she maintain sats in the low 80s. Reports that over the last month she has been sleeping 23/24 hours/day. Reports fall x3 as she often times is lightheaded and feels dizzy. No reported injuries. Lives at home with her disabled son and her boyfriend. States that she does not have pulmonologist and has not followed up recently with cardiology. Pt was admitted and cardiology consulted. Workup consistent with volume overload and patient was diuresed over 8L. Pt was weaned back to her baseline 3L O2. Pulm consutled and recommended outpatient sleep study and PFTs. Cardiology took patient for cardiac cath on 6/15 and was ok with discharge after and patient was discharged home in stable and improved condition. Consults: cardiology and pulmonary/intensive care      Discharge Exam:  See daily progress note    Labs:   Recent Labs     06/14/21  0642 06/15/21  0725   WBC 6.8 7.9   HGB 16.5* 16.0   HCT 51.7* 49.6*    173     Recent Labs     06/14/21  0642 06/15/21  0724    138   K 4.1 3.8   CL 91* 91*   CO2 39* 40*   BUN 16 14   CREATININE 0.76 0.78   CALCIUM 9.2 9.3     No results for input(s): AST, ALT, BILIDIR, BILITOT, ALKPHOS in the last 72 hours.   Recent Labs     06/15/21  0725   INR 1.1     No results for input(s): Ambar Love in the last 72 hours. Urinalysis:   Lab Results   Component Value Date    NITRU Negative 06/02/2013    WBCUA 0-2 06/02/2013    BACTERIA None 06/02/2013    RBCUA 0-2 06/02/2013    BLOODU TRACE 06/02/2013    SPECGRAV 1.015 06/02/2013    GLUCOSEU 100 06/02/2013       Radiology:   Most recent    Chest CT      WITH CONTRAST:No results found for this or any previous visit. WITHOUT CONTRAST: No results found for this or any previous visit. CXR      2-view: Results for orders placed during the hospital encounter of 06/11/21    XR CHEST (2 VW)    Narrative  XR CHEST (2 VW): 6/11/2021 5:38 PM    CLINICAL HISTORY:  sob . COMPARISON: None available. TECHNIQUE: A portable upright AP radiograph of the chest was obtained. FINDINGS:    The cardiac silhouette is at the upper limits of normal which may be secondary to cardiomegaly versus pericardial effusion. The aorta is ectatic which most commonly correlates with chronic hypertension. There are diffuse increased interstitial  pulmonary markings throughout both lungs which may be secondary to pulmonary edema. Impression  The findings may reflect chronic congestive heart failure, CHF. [facility] Portable: Results for orders placed during the hospital encounter of 12/04/18    XR CHEST PORTABLE    Narrative  EXAMINATION: XR CHEST PORTABLE    CLINICAL HISTORY: COUGH    COMPARISONS: SEPTEMBER 4, 2013    FINDINGS: Osseous structures intact. Cardiopericardial silhouette normal. Right lung clear. Summation artifact overlies the left lower lung. Impression  No definite atelectasis/pneumonia identified. Clinical concern warrants, follow-up radiograph may be obtained      Echo No results found for this or any previous visit. Disposition: home    In process/preliminary results:  Outstanding Order Results     No orders found from 5/13/2021 to 6/12/2021.           Patient Instructions:   Discharge Medication List as of 6/15/2021  6:08 PM      START taking these MCG/ACT inhaler Inhale 2 puffs into the lungs 2 times daily, Disp-2 Inhaler, R-3Normal         STOP taking these medications       bumetanide (BUMEX) 2 MG tablet Comments:   Reason for Stopping:         nystatin (MYCOSTATIN) 076947 UNIT/GM powder Comments:   Reason for Stopping:         nystatin (MYCOSTATIN) 523124 UNIT/GM cream Comments:   Reason for Stopping:             Activity: activity as tolerated  Diet: cardiac diet  Wound Care: none needed    Follow-up with Zoie Ervin PA-C  in 1 week.     DC time 35 minutes    Signed:  Electronically signed by [facility]DO on 6/16/2021 at 4:56 PM

## 2021-06-16 NOTE — CARE COORDINATION
``[facility] Transitions Initial Follow Up Call    Call within 2 business days of discharge: Yes    Patient: Hugh Bragg Patient : 1965   MRN: [id number]  Reason for Admission: -6/15/2021 Acute on chronic resp failure, Possible KARISSA, Obesity, Chronic D CHF, s/p LHC, b/l coronary angio. Discharge Date: 6/15/21 RARS: Readmission Risk Score: 13  NR  CT    Last Discharge [facility]       Complaint Diagnosis Description Type Department Provider    21 Shortness of Breath Acute respiratory failure with hypoxia and hypercapnia ([facility] [address] . .. ED to Hosp-Admission (Discharged) (ADMITTED) Mary 26, DO; Marci Zhou. .. Spoke with: Carlos Boxer reports she slept \"heavy\" last night. Wears 3L NC con't and pending OP sleep study arrangements/KARISSA. States she did very well tolerating CPAP at hosp. States minimal exertional SOB and notes some allergy symptoms/sneezing since being home. No cough or congestion concerns. States no edema, chest pain/pressure events, or palpitations. States good wrist ROM and no pain at puncture site. States she needs rollator and placard, routed to PCP. CTN scheduled appts and advised pt, v/u. Denies any home needs. [facility]  12:00  [place], [place] 21 1:30  Dr Mika Tejada  11:45  Dr Verito Robin  2:00    Transitions of Care Initial Call    Was this an external facility discharge? No Discharge Facility:     Challenges to be reviewed by the provider   Additional needs identified to be addressed with provider: Yes  none             Method of communication with provider : chart routing request for rollator and placard. Advance Care Planning:   Does patient have an Advance Directive:  reviewed and current. Was this a readmission? No  Patient stated reason for admission: See above note  Patients top risk factors for readmission: medical condition-Resp failure, COPD, KARISSA, HF.     Care Transition Nurse (CTN) contacted the patient by telephone to

## 2021-06-17 DIAGNOSIS — I11.0 HYPERTENSIVE HEART DISEASE WITH HEART FAILURE (HCC): ICD-10-CM

## 2021-06-17 DIAGNOSIS — I51.89 DIASTOLIC DYSFUNCTION: ICD-10-CM

## 2021-06-17 NOTE — TELEPHONE ENCOUNTER
Rx request   Requested Prescriptions     Pending Prescriptions Disp Refills    Handicap Placard MISC 1 each 0     Sig: by Does not apply route Good for 5 years. Expires 3/27/2026.  Misc. Devices (BARIATRIC ROLLATOR) MISC 1 each 0     Sig: To help with ambulation related to CHF/shortness of breath with activity.      LOV 4/13/2021  Next Visit Date:  Future Appointments   Date Time Provider Pino Mcintosh   6/21/2021  1:30 PM Zehra Crandall PA-C [facility]   6/23/2021 12:00 PM Pedrito Fernandez [facility] [place]   7/8/2021  2:00 PM Kyle Mary MD [address]   7/23/2021 11:45 AM Ortiz Kulkarni, [facility]

## 2021-06-18 ENCOUNTER — TELEPHONE (OUTPATIENT)
Dept: FAMILY MEDICINE CLINIC | Age: 56
End: 2021-06-18

## 2021-06-21 ENCOUNTER — OFFICE VISIT (OUTPATIENT)
Dept: FAMILY MEDICINE CLINIC | Age: 56
End: 2021-06-21
Payer: MEDICAID

## 2021-06-21 VITALS
HEIGHT: 65 IN | BODY MASS INDEX: 48.82 KG/M2 | WEIGHT: 293 LBS | DIASTOLIC BLOOD PRESSURE: 82 MMHG | TEMPERATURE: 98.1 F | HEART RATE: 100 BPM | SYSTOLIC BLOOD PRESSURE: 128 MMHG

## 2021-06-21 DIAGNOSIS — Z09 HOSPITAL DISCHARGE FOLLOW-UP: ICD-10-CM

## 2021-06-21 DIAGNOSIS — J96.01 ACUTE RESPIRATORY FAILURE WITH HYPOXIA AND HYPERCAPNIA (HCC): Primary | ICD-10-CM

## 2021-06-21 DIAGNOSIS — J44.9 CHRONIC OBSTRUCTIVE PULMONARY DISEASE, UNSPECIFIED COPD TYPE (HCC): ICD-10-CM

## 2021-06-21 DIAGNOSIS — F17.200 NICOTINE USE DISORDER: ICD-10-CM

## 2021-06-21 DIAGNOSIS — J96.02 ACUTE RESPIRATORY FAILURE WITH HYPOXIA AND HYPERCAPNIA (HCC): Primary | ICD-10-CM

## 2021-06-21 DIAGNOSIS — G47.33 OBSTRUCTIVE SLEEP APNEA SYNDROME: ICD-10-CM

## 2021-06-21 PROCEDURE — 99214 OFFICE O/P EST MOD 30 MIN: CPT | Performed by: PHYSICIAN ASSISTANT

## 2021-06-21 PROCEDURE — 1111F DSCHRG MED/CURRENT MED MERGE: CPT | Performed by: PHYSICIAN ASSISTANT

## 2021-06-21 RX ORDER — IPRATROPIUM BROMIDE AND ALBUTEROL SULFATE 2.5; .5 MG/3ML; MG/3ML
1 SOLUTION RESPIRATORY (INHALATION) EVERY 4 HOURS PRN
Qty: 360 ML | Refills: 1 | Status: SHIPPED | OUTPATIENT
Start: 2021-06-21 | End: 2022-08-29 | Stop reason: SDUPTHER

## 2021-06-21 ASSESSMENT — ENCOUNTER SYMPTOMS
ABDOMINAL PAIN: 1
EYE ITCHING: 1
SHORTNESS OF BREATH: 1
BACK PAIN: 1

## 2021-06-23 ENCOUNTER — CARE COORDINATION (OUTPATIENT)
Dept: CARE COORDINATION | Age: 56
End: 2021-06-23

## 2021-06-23 NOTE — CARE COORDINATION
[facility] Transitions Follow Up Call    2021    Patient: Paula Caraballo  Patient : 1965   MRN: [id number]  Reason for Admission: -6/15/2021 Acute on chronic resp failure, Possible KARISSA, Obesity, Chronic D CHF, s/p LHC, b/l coronary angio. Discharge Date: 6/15/21 RARS: Readmission Risk Score: 13         Spoke with: Patient    Rupert Rivera reports doing well. Denies SOB, Cough, Congestion, CP/pressure/palpitations, BLE edema. Pt c/o seasonal allergy related sneezing. No new pain concerns. Pt wearing O2 @ 3L NC continuously, awaiting sleep study for possible BIPAP. Pt received script in mail for Handicap Placard. Pt denies any home needs at this time. Care Transitions Follow Up Call    Needs to be reviewed by the provider   Additional needs identified to be addressed with provider: No  none         Method of communication with provider : none      Care Transition Nurse (CTN) contacted the patient by telephone to follow up after admission on 21. Verified name and  with patient as identifiers. Addressed changes since last contact: none  Discussed follow-up appointments. If no appointment was previously scheduled, appointment scheduling offered: Yes. Is follow up appointment scheduled within 7 days of discharge? Yes. Advance Care Planning:   Does patient have an Advance Directive:  not on file. CTN reviewed discharge instructions, medical action plan and red flags with patient and discussed any barriers to care and/or understanding of plan of care after discharge. Discussed appropriate site of care based on symptoms and resources available to patient including: PCP, Specialist and When to call 911. The patient agrees to contact the PCP office for questions related to their healthcare.      Patients top risk factors for readmission: medical condition-CHF, COPD  Interventions to address risk factors: Obtained and reviewed discharge summary and/or continuity of care documents    Non-[facility] follow up appointment(s):     CTN provided contact information for future needs. Plan for follow-up call in 5-7 days based on severity of symptoms and risk factors. Plan for next call: symptom management-COPD, CHF          Care Transitions Subsequent and Final Call    Subsequent and Final Calls  Care Transitions Interventions  Other Interventions:            Follow Up  Future Appointments   Date Time Provider Pino Mcintosh   7/8/2021  2:00 PM Hamlet Blevins, [address] [facility]   7/23/2021 11:45 AM Ortiz Givens,  [address] [place] [address]

## 2021-09-27 ENCOUNTER — TELEPHONE (OUTPATIENT)
Dept: FAMILY MEDICINE CLINIC | Age: 56
End: 2021-09-27

## 2021-09-27 NOTE — TELEPHONE ENCOUNTER
Patient called asking about the Covid-19 vaccine. She wants to know if she should get the vaccine due to all her health conditions. Also, she would like to have a generic  referral to see a podiatrist. Please call her at [phone]. Please advise and thanks!

## 2021-10-05 NOTE — TELEPHONE ENCOUNTER
I've tried to call the patient a few times but I keep getting a busy tone. Kindly schedule patient for evaluation prior to Podiatry referral. We can also discuss her concerns/questions in regards to the COVID vaccine at that time. Thanks.

## 2021-12-07 ENCOUNTER — OFFICE VISIT (OUTPATIENT)
Dept: FAMILY MEDICINE CLINIC | Age: 56
End: 2021-12-07
Payer: MEDICARE

## 2021-12-07 VITALS
HEIGHT: 65 IN | WEIGHT: 293 LBS | TEMPERATURE: 98.2 F | OXYGEN SATURATION: 94 % | DIASTOLIC BLOOD PRESSURE: 90 MMHG | RESPIRATION RATE: 18 BRPM | SYSTOLIC BLOOD PRESSURE: 150 MMHG | HEART RATE: 100 BPM | BODY MASS INDEX: 48.82 KG/M2

## 2021-12-07 DIAGNOSIS — R60.0 PEDAL EDEMA: ICD-10-CM

## 2021-12-07 DIAGNOSIS — Z12.31 BREAST CANCER SCREENING BY MAMMOGRAM: ICD-10-CM

## 2021-12-07 DIAGNOSIS — I51.89 DIASTOLIC DYSFUNCTION: ICD-10-CM

## 2021-12-07 DIAGNOSIS — M25.561 CHRONIC PAIN OF BOTH KNEES: ICD-10-CM

## 2021-12-07 DIAGNOSIS — M25.562 CHRONIC PAIN OF BOTH KNEES: ICD-10-CM

## 2021-12-07 DIAGNOSIS — M25.512 CHRONIC LEFT SHOULDER PAIN: Primary | ICD-10-CM

## 2021-12-07 DIAGNOSIS — G89.29 CHRONIC LEFT SHOULDER PAIN: Primary | ICD-10-CM

## 2021-12-07 DIAGNOSIS — Z00.00 ROUTINE ADULT HEALTH MAINTENANCE: ICD-10-CM

## 2021-12-07 DIAGNOSIS — R06.09 DYSPNEA ON EXERTION: ICD-10-CM

## 2021-12-07 DIAGNOSIS — G89.29 CHRONIC PAIN OF BOTH KNEES: ICD-10-CM

## 2021-12-07 DIAGNOSIS — Z00.00 ROUTINE GENERAL MEDICAL EXAMINATION AT A HEALTH CARE FACILITY: ICD-10-CM

## 2021-12-07 DIAGNOSIS — I10 HYPERTENSION, UNSPECIFIED TYPE: ICD-10-CM

## 2021-12-07 LAB
ALBUMIN SERPL-MCNC: 4.4 G/DL (ref 3.5–4.6)
ALP BLD-CCNC: 105 U/L (ref 40–130)
ALT SERPL-CCNC: 16 U/L (ref 0–33)
ANION GAP SERPL CALCULATED.3IONS-SCNC: 17 MEQ/L (ref 9–15)
AST SERPL-CCNC: 15 U/L (ref 0–35)
BILIRUB SERPL-MCNC: 0.4 MG/DL (ref 0.2–0.7)
BUN BLDV-MCNC: 10 MG/DL (ref 6–20)
CALCIUM SERPL-MCNC: 9.7 MG/DL (ref 8.5–9.9)
CHLORIDE BLD-SCNC: 103 MEQ/L (ref 95–107)
CO2: 26 MEQ/L (ref 20–31)
CREAT SERPL-MCNC: 0.8 MG/DL (ref 0.5–0.9)
GFR AFRICAN AMERICAN: >60
GFR NON-AFRICAN AMERICAN: >60
GLOBULIN: 3.3 G/DL (ref 2.3–3.5)
GLUCOSE BLD-MCNC: 95 MG/DL (ref 70–99)
HBA1C MFR BLD: 5.3 % (ref 4.8–5.9)
POTASSIUM SERPL-SCNC: 4.4 MEQ/L (ref 3.4–4.9)
SODIUM BLD-SCNC: 146 MEQ/L (ref 135–144)
TOTAL PROTEIN: 7.7 G/DL (ref 6.3–8)

## 2021-12-07 PROCEDURE — 99213 OFFICE O/P EST LOW 20 MIN: CPT | Performed by: INTERNAL MEDICINE

## 2021-12-07 RX ORDER — LOSARTAN POTASSIUM 25 MG/1
25 TABLET ORAL DAILY
Qty: 30 TABLET | Refills: 3 | Status: SHIPPED | OUTPATIENT
Start: 2021-12-07 | End: 2022-02-24

## 2021-12-07 RX ORDER — MELOXICAM 15 MG/1
15 TABLET ORAL DAILY PRN
Qty: 30 TABLET | Refills: 0 | Status: SHIPPED | OUTPATIENT
Start: 2021-12-07 | End: 2022-02-15

## 2021-12-07 RX ORDER — FUROSEMIDE 20 MG/1
20 TABLET ORAL DAILY
Qty: 60 TABLET | Refills: 3 | Status: SHIPPED | OUTPATIENT
Start: 2021-12-07 | End: 2022-02-15 | Stop reason: SDUPTHER

## 2021-12-07 RX ORDER — METHYLPREDNISOLONE 4 MG/1
TABLET ORAL
Qty: 1 KIT | Refills: 0 | Status: SHIPPED | OUTPATIENT
Start: 2021-12-07 | End: 2022-02-15

## 2021-12-07 ASSESSMENT — ENCOUNTER SYMPTOMS
SHORTNESS OF BREATH: 1
VOMITING: 0
ABDOMINAL PAIN: 0
WHEEZING: 0
NAUSEA: 0
CHEST TIGHTNESS: 0
BLOOD IN STOOL: 0
COUGH: 0

## 2021-12-07 NOTE — PROGRESS NOTES
echoSubjective:      Patient ID: Leilani Silva is a 55 y.o. female who presents today for:  Chief Complaint   Patient presents with    Arthritis     pain in left shoulder, has spurs in the shoulder, pain started x1 week also has pain in both knees, no cartilage in knees        HPI   Patient presenting with longstanding left shoulder and bilateral knee pain. History is pertinent for remote left shoulder injury about 15 years ago which improved with Cortisone infections and PT. Noted bone spurring and related evidence of OA per patient. No recurrence noted until a few weeks ago; progressively worsening. Patient also known to have chronic knee pain, also attributed to DJD. Now significantly worse with weight bearing and ambulation. Associated locking and stiffness in joints. No relief noted with Tylenol, Ibuprofen or Aleve. Requesting stronger pain medication. ROS also significant for bilateral leg swelling. Endorses associated dyspnea and intermittent chest pain on exertion. ECHO done in 6/2021 shows evidence of diastolic dysfunction (EF 69%). Patient was previously referred to Cardiology (Dr. Quita Siegel) but was unable to follow up. Was also placed on lasix but has not been taking.     Past Medical History:   Diagnosis Date    Anxiety     Asthma     [facility]     Chronic back pain     [facility]ch[facility] disease[facility]     Depression     Hypertension     Obesity     Pelvic pain in female 2/15/2018    Type 2 diabetes mellitus without complication ([facility] 9/9/5635     Past Surgical History:   Procedure Laterality Date    BLADDER SUSPENSION  02/2014    CARPAL TUNNEL RELEASE  89 or 90    right hand     HYSTERECTOMY  2009    full    [place] CMBND ANTERPOST COLPORRAPHY W/CYSTO W/NTRCL RPR N/A 2/19/2018    EXCISION OF VAGINAL MESH, CYSTOSCOPY performed by Kayley Fox DO at [facility]      27 yrs ago     Family History   Problem Relation Age of Onset    Alzheimer's Disease Father     High Blood Pressure Father     Stroke Father         11 strokes in one year    Asthma Sister     Asthma Brother     No Known Problems Daughter     No Known Problems Daughter      Allergies   Allergen Reactions    Food Hives     Patient still eats strawberries    Other Rash     grass     Current Outpatient Medications on File Prior to Visit   Medication Sig Dispense Refill    oxybutynin (DITROPAN-XL) 5 MG extended release tablet TAKE 1 TABLET BY MOUTH EVERY DAY (Patient not taking: Reported on 12/7/2021) 90 tablet 1    ipratropium-albuterol (DUONEB) 0.5-2.5 (3) MG/3ML SOLN nebulizer solution Inhale 3 mLs into the lungs every 4 hours as needed for Shortness of Breath (Patient not taking: Reported on 12/7/2021) 360 mL 1    Handicap Placard MISC by Does not apply route Good for 5 years. Expires 3/27/2026. (Patient not taking: Reported on 12/7/2021) 1 each 0    Misc. Devices (BARIATRIC ROLLATOR) MISC To help with ambulation related to CHF/shortness of breath with activity. (Patient not taking: Reported on 12/7/2021) 1 each 0    carvedilol (COREG) 3.125 MG tablet Take 1 tablet by mouth 2 times daily (with meals) HOLD for SBP<100 or HR<60 (Patient not taking: Reported on 12/7/2021) 60 tablet 3    furosemide (LASIX) 40 MG tablet Take 1 tablet by mouth 2 times daily (Patient not taking: Reported on 12/7/2021) 60 tablet 5    potassium chloride (KLOR-CON M) 10 MEQ extended release tablet Take 2 tablets by mouth daily (Patient not taking: Reported on 6/21/2021) 60 tablet 2    silver sulfADIAZINE (SILVADENE) 1 % cream Apply topically daily.  (Patient not taking: Reported on 12/7/2021) 400 g 2    atorvastatin (LIPITOR) 10 MG tablet Take 1 tablet by mouth nightly (Patient not taking: Reported on 12/7/2021) 90 tablet 4    vitamin D (ERGOCALCIFEROL) 1.25 MG (75053 UT) CAPS capsule Take 1 capsule by mouth Twice a Week (Patient not taking: Reported on 12/7/2021) 24 capsule 1    buPROPion (WELLBUTRIN XL) 150 MG extended release tablet TAKE 1 TABLET BY MOUTH EVERY DAY IN THE MORNING (Patient not taking: Reported on 12/7/2021) 90 tablet 1    ergotamine-caffeine (CAFERGOT) 1-100 MG per tablet Two tablets at onset of attack; then 1 tablet every 30 minutes as needed; maximum: 6 tablets per attack; do not exceed 10 tablets/week. (Patient not taking: Reported on 6/16/2021) 30 tablet [address] by Does not apply route (Patient not taking: Reported on 12/7/2021) 1 each 0    albuterol sulfate  (90 Base) MCG/ACT inhaler Inhale 2 puffs into the lungs 2 times daily (Patient not taking: Reported on 12/7/2021) 2 Inhaler 3     No current facility-administered medications on file prior to visit. Review of Systems   Constitutional: Positive for fatigue. Negative for activity change, chills, diaphoresis and fever. HENT: Negative. Respiratory: Positive for shortness of breath (intermittent; with exertion). Negative for cough, chest tightness and wheezing. Cardiovascular: Positive for chest pain (intermittent) and leg swelling. Negative for palpitations. Gastrointestinal: Negative for abdominal pain, blood in stool, nausea and vomiting. Genitourinary: Negative. Neurological: Negative for dizziness, syncope, light-headedness and headaches. Objective:   BP (!) 150/90 (Site: Left Upper Arm, Position: Sitting, Cuff Size: Large Adult)   Pulse 100   Temp 98.2 °F (36.8 °C) (Temporal)   Resp 18   Ht 5' 5\" (1.651 m)   Wt (!) 346 lb (156.9 kg)   LMP  (LMP Unknown)   SpO2 94%   BMI 57.58 kg/m²     Physical Exam  Constitutional:       General: She is not in acute distress. Appearance: Normal appearance. She is normal weight. She is not diaphoretic. HENT:      Head: Normocephalic and atraumatic. Mouth/Throat:      Mouth: Mucous membranes are moist.      Pharynx: Oropharynx is clear.    Eyes:      Conjunctiva/sclera: Conjunctivae normal.      Pupils: Pupils are equal, round, and reactive to light. Cardiovascular:      Rate and Rhythm: Normal rate and regular rhythm. Pulses: Normal pulses. Heart sounds: Normal heart sounds. No murmur heard. No friction rub. Pulmonary:      Effort: Pulmonary effort is normal. No respiratory distress. Breath sounds: Normal breath sounds. No wheezing or rhonchi. Chest:      Chest wall: No tenderness. Abdominal:      General: Abdomen is flat. Bowel sounds are normal. There is no distension. Palpations: Abdomen is soft. Tenderness: There is no abdominal tenderness. Musculoskeletal:         General: No tenderness or deformity. Normal range of motion. Right lower leg: Edema (trace) present. Left lower leg: Edema (trace) present. Neurological:      General: No focal deficit present. Mental Status: She is alert and oriented to person, place, and time. Mental status is at baseline. Assessment:      Bia Willard was seen today for arthritis. Diagnoses and all orders for this visit:    Chronic left shoulder pain       -      Symptoms suggestive of DJD  -     XR SHOULDER LEFT (MIN 2 VIEWS); Future  -     methylPREDNISolone (MEDROL, ROSALINA,) 4 MG tablet; Take by mouth. -     meloxicam (MOBIC) 15 MG tablet; Take 1 tablet by mouth daily as needed for Pain    Chronic pain of both knees        -     Symptoms suggestive of DJD. -     XR KNEE RIGHT (MIN 4 VIEWS); Future  -     XR KNEE LEFT (MIN 4 VIEWS); Future  -     methylPREDNISolone (MEDROL, ROSALINA,) 4 MG tablet; Take by mouth. -     meloxicam (MOBIC) 15 MG tablet; Take 1 tablet by mouth daily as needed for Pain    Diastolic Dysfunction       -      Reported b/l LE edema, WHITE and intermittent chest pain        ECHO (6/2021)- shows evidence of diastolic dysfunction (EF 52%). -     Previously referred to Cardiology but did not follow up. -     Referral placed for Cardiology (Dr. Simi Sanchez)        -     Recommence on Lasix 20mg daily.     Hypertension, unspecified type        -     /90 at current visit. Previously taking Losartan 25 mg daily but not currently compliant.        -   Resume on losartan (COZAAR) 25 MG tablet; Take 1 tablet by mouth daily    Routine general medical examination at a health care facility  -     Due for Mammogram- JAMIR DIGITAL SCREEN W OR WO CAD BILATERAL; Future  -     Hemoglobin A1C; Future  -     Comprehensive Metabolic Panel; Future    Health Maintenance   Topic Date Due    Hepatitis C screen  Never done    COVID-19 Vaccine (1) Never done    HIV screen  Never done    Breast cancer screen  Never done    Shingles Vaccine (1 of 2) Never done    DTaP/Tdap/Td vaccine (1 - Tdap) 12/28/2021 (Originally 5/6/1985)    Pneumococcal 0-64 years Vaccine (1 of 2 - PPSV23) 01/07/2022 (Originally 5/6/1972)    Flu vaccine (1) 12/07/2022 (Originally 9/1/2021)    Annual Wellness Visit (AWV)  03/27/2022    Low dose CT lung screening  06/11/2022    Lipid screen  06/15/2022    A1C test (Diabetic or Prediabetic)  12/07/2022    Potassium monitoring  12/07/2022    Creatinine monitoring  12/07/2022    Colon cancer screen fecal DNA test (Cologuard)  02/25/2023    Hepatitis A vaccine  Aged Out    Hepatitis B vaccine  Aged Out    Hib vaccine  Aged Out    Meningococcal (ACWY) vaccine  [employer]:    As above.     Orders Placed This Encounter   Procedures    JAMIR DIGITAL SCREEN W OR WO CAD BILATERAL     Standing Status:   Future     Standing Expiration Date:   2/7/2023     Order Specific Question:   Reason for exam:     Answer:   Breast Cancer Screen    XR SHOULDER LEFT (MIN 2 VIEWS)     Standing Status:   Future     Standing Expiration Date:   12/7/2022     Order Specific Question:   Reason for exam:     Answer:   Evaluation for OA/DJD    XR KNEE RIGHT (MIN 4 VIEWS)     Standing Status:   Future     Standing Expiration Date:   12/7/2022     Order Specific Question:   Reason for exam:     Answer:   Evaluation for OA/DJD    XR KNEE LEFT (MIN 4 VIEWS)     Standing Status:   Future     Standing Expiration Date:   12/7/2022     Order Specific Question:   Reason for exam:     Answer:   Evaluation for OA/DJD.  Hemoglobin A1C     Standing Status:   Future     Number of Occurrences:   1     Standing Expiration Date:   12/7/2022    Comprehensive Metabolic Panel     Standing Status:   Future     Number of Occurrences:   1     Standing Expiration Date:   12/7/2022    Echo 2d w doppler w color w contrast     Standing Status:   Future     Standing Expiration Date:   12/7/2022     Order Specific Question:   Reason for exam:     Answer:   Evaluation for cardiac failure     Orders Placed This Encounter   Medications    methylPREDNISolone (MEDROL, ROSALINA,) 4 MG tablet     Sig: Take by mouth. Dispense:  1 kit     Refill:  0    losartan (COZAAR) 25 MG tablet     Sig: Take 1 tablet by mouth daily     Dispense:  30 tablet     Refill:  3    meloxicam (MOBIC) 15 MG tablet     Sig: Take 1 tablet by mouth daily as needed for Pain     Dispense:  30 tablet     Refill:  0       Return for F/u in 4 weeks.     Rafaela Wallace MD

## 2021-12-29 ENCOUNTER — HOSPITAL ENCOUNTER (OUTPATIENT)
Dept: NON INVASIVE DIAGNOSTICS | Age: 56
Discharge: HOME | End: 2021-12-29
Payer: MEDICARE

## 2021-12-29 ENCOUNTER — HOSPITAL ENCOUNTER (OUTPATIENT)
Dept: GENERAL RADIOLOGY | Age: 56
Discharge: HOME | End: 2021-12-31
Payer: MEDICARE

## 2021-12-29 DIAGNOSIS — G89.29 CHRONIC PAIN OF BOTH KNEES: ICD-10-CM

## 2021-12-29 DIAGNOSIS — M25.512 CHRONIC LEFT SHOULDER PAIN: ICD-10-CM

## 2021-12-29 DIAGNOSIS — M25.561 CHRONIC PAIN OF BOTH KNEES: ICD-10-CM

## 2021-12-29 DIAGNOSIS — I11.0 HYPERTENSIVE HEART DISEASE WITH HEART FAILURE (HCC): ICD-10-CM

## 2021-12-29 DIAGNOSIS — I51.89 DIASTOLIC DYSFUNCTION: ICD-10-CM

## 2021-12-29 DIAGNOSIS — R09.02 HYPOXIA: ICD-10-CM

## 2021-12-29 DIAGNOSIS — R60.0 BILATERAL LEG EDEMA: ICD-10-CM

## 2021-12-29 DIAGNOSIS — G89.29 CHRONIC LEFT SHOULDER PAIN: ICD-10-CM

## 2021-12-29 DIAGNOSIS — I51.7 LVH (LEFT VENTRICULAR HYPERTROPHY): ICD-10-CM

## 2021-12-29 DIAGNOSIS — M25.562 CHRONIC PAIN OF BOTH KNEES: ICD-10-CM

## 2021-12-29 DIAGNOSIS — R06.02 SOB (SHORTNESS OF BREATH): ICD-10-CM

## 2021-12-29 LAB
LV EF: 60 %
LVEF MODALITY: NORMAL

## 2021-12-29 PROCEDURE — 73564 X-RAY EXAM KNEE 4 OR MORE: CPT

## 2021-12-29 PROCEDURE — 93306 TTE W/DOPPLER COMPLETE: CPT

## 2021-12-29 PROCEDURE — 73030 X-RAY EXAM OF SHOULDER: CPT

## 2022-01-03 ENCOUNTER — VIRTUAL VISIT (OUTPATIENT)
Dept: FAMILY MEDICINE CLINIC | Age: 57
End: 2022-01-03
Payer: MEDICARE

## 2022-01-03 DIAGNOSIS — M17.0 OSTEOARTHRITIS OF BOTH KNEES, UNSPECIFIED OSTEOARTHRITIS TYPE: ICD-10-CM

## 2022-01-03 DIAGNOSIS — G89.29 CHRONIC PAIN OF BOTH KNEES: Primary | ICD-10-CM

## 2022-01-03 DIAGNOSIS — M25.562 CHRONIC PAIN OF BOTH KNEES: Primary | ICD-10-CM

## 2022-01-03 DIAGNOSIS — M25.561 CHRONIC PAIN OF BOTH KNEES: Primary | ICD-10-CM

## 2022-01-03 DIAGNOSIS — I51.89 DIASTOLIC DYSFUNCTION: ICD-10-CM

## 2022-01-03 DIAGNOSIS — Z20.822 ENCOUNTER BY TELEHEALTH FOR SUSPECTED COVID-19: ICD-10-CM

## 2022-01-03 PROCEDURE — 87426 SARSCOV CORONAVIRUS AG IA: CPT | Performed by: INTERNAL MEDICINE

## 2022-01-03 PROCEDURE — 99214 OFFICE O/P EST MOD 30 MIN: CPT | Performed by: INTERNAL MEDICINE

## 2022-01-03 PROCEDURE — 87804 INFLUENZA ASSAY W/OPTIC: CPT | Performed by: INTERNAL MEDICINE

## 2022-01-03 RX ORDER — LOPERAMIDE HYDROCHLORIDE 2 MG/1
2 CAPSULE ORAL 4 TIMES DAILY PRN
Qty: 40 CAPSULE | Refills: 0 | Status: SHIPPED | OUTPATIENT
Start: 2022-01-03 | End: 2022-01-13

## 2022-01-03 RX ORDER — GUAIFENESIN 100 MG/5ML
10 SYRUP ORAL EVERY 4 HOURS PRN
Qty: 355 ML | Refills: 3 | Status: SHIPPED | OUTPATIENT
Start: 2022-01-03 | End: 2022-02-24

## 2022-01-03 ASSESSMENT — ENCOUNTER SYMPTOMS
WHEEZING: 0
CHEST TIGHTNESS: 0
DIARRHEA: 1
SHORTNESS OF BREATH: 0
NAUSEA: 0
VOMITING: 0
COUGH: 1
ABDOMINAL PAIN: 0

## 2022-01-03 NOTE — PROGRESS NOTES
Problem Relation Age of Onset    Alzheimer's Disease Father     High Blood Pressure Father     Stroke Father         11 strokes in one year    Asthma Sister     Asthma Brother     No Known Problems Daughter     No Known Problems Daughter      Allergies   Allergen Reactions    Food Hives     Patient still eats strawberries    Other Rash     grass     Current Outpatient Medications on File Prior to Visit   Medication Sig Dispense Refill    carvedilol (COREG) 3.125 MG tablet TAKE 1 TABLET BY MOUTH TWICE A  tablet 0    methylPREDNISolone (MEDROL, ROSALINA,) 4 MG tablet Take by mouth. 1 kit 0    losartan (COZAAR) 25 MG tablet Take 1 tablet by mouth daily 30 tablet 3    meloxicam (MOBIC) 15 MG tablet Take 1 tablet by mouth daily as needed for Pain 30 tablet 0    furosemide (LASIX) 20 MG tablet Take 1 tablet by mouth daily 60 tablet 3    oxybutynin (DITROPAN-XL) 5 MG extended release tablet TAKE 1 TABLET BY MOUTH EVERY DAY 90 tablet 1    ipratropium-albuterol (DUONEB) 0.5-2.5 (3) MG/3ML SOLN nebulizer solution Inhale 3 mLs into the lungs every 4 hours as needed for Shortness of Breath 360 mL 1    Handicap Placard MISC by Does not apply route Good for 5 years. Expires 3/27/2026. 1 each 0    Misc. Devices (BARIATRIC ROLLATOR) MISC To help with ambulation related to CHF/shortness of breath with activity. 1 each 0    potassium chloride (KLOR-CON M) 10 MEQ extended release tablet Take 2 tablets by mouth daily 60 tablet 2    silver sulfADIAZINE (SILVADENE) 1 % cream Apply topically daily.  400 g 2    atorvastatin (LIPITOR) 10 MG tablet Take 1 tablet by mouth nightly 90 tablet 4    vitamin D (ERGOCALCIFEROL) 1.25 MG (02099 UT) CAPS capsule Take 1 capsule by mouth Twice a Week 24 capsule 1    buPROPion (WELLBUTRIN XL) 150 MG extended release tablet TAKE 1 TABLET BY MOUTH EVERY DAY IN THE MORNING 90 tablet 1    ergotamine-caffeine (CAFERGOT) 1-100 MG per tablet Two tablets at onset of attack; then 1 tablet every 30 minutes as needed; maximum: 6 tablets per attack; do not exceed 10 tablets/week. 30 tablet [address] by Does not apply route 1 each 0    albuterol sulfate  (90 Base) MCG/ACT inhaler Inhale 2 puffs into the lungs 2 times daily 2 Inhaler 3     No current facility-administered medications on file prior to visit. Review of Systems   Constitutional: Negative for activity change, chills, diaphoresis, fatigue and fever. HENT: Positive for congestion. Respiratory: Positive for cough. Negative for chest tightness, shortness of breath and wheezing. Cardiovascular: Negative for chest pain, palpitations and leg swelling. Gastrointestinal: Positive for diarrhea. Negative for abdominal pain, nausea and vomiting. Musculoskeletal: Positive for myalgias. Neurological: Negative for dizziness, syncope, light-headedness and headaches. Objective:   LMP  (LMP Unknown)     Physical Exam  Constitutional:       General: She is not in acute distress. Appearance: She is not toxic-appearing. HENT:      Nose: No congestion. Eyes:      Conjunctiva/sclera: Conjunctivae normal.   Pulmonary:      Effort: Pulmonary effort is normal.   Neurological:      Mental Status: She is alert and oriented to person, place, and time. Mental status is at baseline. Assessment:      Nusrat Moreno was seen today for viral symptoms and to discuss labs. Diagnoses and all orders for this visit:      Osteoarthritis of both knees, unspecified osteoarthritis type        -     X-ray findings suggestive.  -     diclofenac sodium (VOLTAREN) 1 % GEL; Apply 4 g topically 4 times daily    Encounter by telehealth for suspected COVID-19  -     COVID-suggestive symptoms.   -     POCT COVID-19, Antigen. -     Influenza also a consideration: POCT Influenza A/B for r/o  -     loperamide (RA ANTI-DIARRHEAL) 2 MG capsule;  Take 1 capsule by mouth 4 times daily as needed for Diarrhea  -     guaiFENesin (ALTARUSSIN) 100 MG/5ML syrup; Take 10 mLs by mouth every 4 hours as needed for Cough  -     Continue Tylenol 650mg q6 prn or Motrin 400-600mg q6 prn for fever/chills, headaches, body aches       -       Counseled on need for adequate hydration  -      Self-isolation reinforced        -      Counseled on warning symptoms. Advised to present to the ED with occurrence of severe symptoms. Diastolic dysfunction        -   Reported b/l LE edema, WHITE at previous visit. ECHO (6/2021)- shows evidence of diastolic dysfunction (EF 60%). -     Commenced on Lasix 20mg daily and reports good response; continue at current dose. -     Referred to Cardiology (Dr. Nettie Montalvo); upcoming appointment. Health Maintenance   Topic Date Due    Hepatitis C screen  Never done    COVID-19 Vaccine (1) Never done    HIV screen  Never done    DTaP/Tdap/Td vaccine (1 - Tdap) Never done    Breast cancer screen  Never done    Shingles Vaccine (1 of 2) Never done    Pneumococcal 0-64 years Vaccine (1 of 2 - PPSV23) 01/07/2022 (Originally 5/6/1972)    Flu vaccine (1) 12/07/2022 (Originally 9/1/2021)    Depression Screen  03/26/2022    Annual Wellness Visit (AWV)  03/27/2022    Low dose CT lung screening  06/11/2022    Lipid screen  06/15/2022    A1C test (Diabetic or Prediabetic)  12/07/2022    Potassium monitoring  12/07/2022    Creatinine monitoring  12/07/2022    Colon cancer screen fecal DNA test (Cologuard)  02/25/2023    Hepatitis A vaccine  Aged Out    Hepatitis B vaccine  Aged Out    Hib vaccine  Aged Out    Meningococcal (ACWY) vaccine  [employer]:    As above. Orders Placed This Encounter   Procedures    POCT COVID-19, Antigen     Order Specific Question:   Is this test for diagnosis or screening? Answer:   Diagnosis of ill patient     Order Specific Question:   Symptomatic for COVID-19 as defined by CDC?      Answer:   Yes     Order Specific Question:   Date of Symptom Onset     Answer:   12/31/2021     Order Specific Question:   Hospitalized for COVID-19? Answer:   No     Order Specific Question:   Admitted to ICU for COVID-19? Answer:   No     Order Specific Question:   Employed in healthcare setting? Answer:   No     Order Specific Question:   Resident in a congregate (group) care setting? Answer:   No     Order Specific Question:   Pregnant? Answer:   No     Order Specific Question:   Previously tested for COVID-19? Answer: Yes    POCT Influenza A/B     Orders Placed This Encounter   Medications    diclofenac sodium (VOLTAREN) 1 % GEL     Sig: Apply 4 g topically 4 times daily     Dispense:  150 g     Refill:  2    loperamide (RA ANTI-DIARRHEAL) 2 MG capsule     Sig: Take 1 capsule by mouth 4 times daily as needed for Diarrhea     Dispense:  40 capsule     Refill:  0    guaiFENesin (ALTARUSSIN) 100 MG/5ML syrup     Sig: Take 10 mLs by mouth every 4 hours as needed for Cough     Dispense:  355 mL     Refill:  3       No follow-ups on file. On this date 1/3/2022 I have spent 15 minutes reviewing previous notes, test results and face to face (virtual) with the patient discussing the diagnosis and importance of compliance with the treatment plan as well as documenting on the day of the visit. Dominicminna Diazd was evaluated through a synchronous (real-time) audio-video encounter. The patient (or guardian if applicable) is aware that this is a billable service. Verbal consent to proceed has been obtained within the past 12 months. The visit was conducted pursuant to the emergency declaration under the [address] [place] [address], [address] [place] [address] authority and the [employer] General Act. Patient identification was verified, and a caregiver was present when appropriate. The patient was located in a state where the provider was credentialed to provide care.     Jeromy Calzada MD

## 2022-02-15 ENCOUNTER — OFFICE VISIT (OUTPATIENT)
Dept: CARDIOLOGY CLINIC | Age: 57
End: 2022-02-15
Payer: MEDICARE

## 2022-02-15 VITALS
HEIGHT: 65 IN | BODY MASS INDEX: 48.82 KG/M2 | HEART RATE: 97 BPM | RESPIRATION RATE: 18 BRPM | SYSTOLIC BLOOD PRESSURE: 132 MMHG | OXYGEN SATURATION: 98 % | DIASTOLIC BLOOD PRESSURE: 82 MMHG | WEIGHT: 293 LBS

## 2022-02-15 DIAGNOSIS — E78.5 DYSLIPIDEMIA: ICD-10-CM

## 2022-02-15 DIAGNOSIS — I51.7 LVH (LEFT VENTRICULAR HYPERTROPHY): ICD-10-CM

## 2022-02-15 DIAGNOSIS — I51.89 DIASTOLIC DYSFUNCTION: ICD-10-CM

## 2022-02-15 DIAGNOSIS — I10 ESSENTIAL HYPERTENSION: Primary | ICD-10-CM

## 2022-02-15 DIAGNOSIS — E88.81 DYSMETABOLIC SYNDROME: ICD-10-CM

## 2022-02-15 DIAGNOSIS — I25.10 CORONARY ARTERY DISEASE DUE TO LIPID RICH PLAQUE: ICD-10-CM

## 2022-02-15 DIAGNOSIS — I10 ESSENTIAL (PRIMARY) HYPERTENSION: ICD-10-CM

## 2022-02-15 DIAGNOSIS — I25.83 CORONARY ARTERY DISEASE DUE TO LIPID RICH PLAQUE: ICD-10-CM

## 2022-02-15 DIAGNOSIS — R60.0 EDEMA OF BOTH LEGS: ICD-10-CM

## 2022-02-15 PROCEDURE — 99214 OFFICE O/P EST MOD 30 MIN: CPT | Performed by: INTERNAL MEDICINE

## 2022-02-15 RX ORDER — CARVEDILOL 3.12 MG/1
TABLET ORAL
Qty: 180 TABLET | Refills: 3 | Status: SHIPPED | OUTPATIENT
Start: 2022-02-15 | End: 2022-08-01 | Stop reason: SDUPTHER

## 2022-02-15 RX ORDER — ASPIRIN 81 MG/1
81 TABLET ORAL DAILY
Qty: 90 TABLET | Refills: 1 | Status: SHIPPED | OUTPATIENT
Start: 2022-02-15 | End: 2022-08-01 | Stop reason: SDUPTHER

## 2022-02-15 RX ORDER — FUROSEMIDE 20 MG/1
20 TABLET ORAL DAILY
Qty: 60 TABLET | Refills: 3 | Status: SHIPPED | OUTPATIENT
Start: 2022-02-15 | End: 2022-02-18 | Stop reason: DRUGHIGH

## 2022-02-15 RX ORDER — FUROSEMIDE 40 MG/1
40 TABLET ORAL DAILY
Qty: 90 TABLET | Refills: 3 | Status: SHIPPED | OUTPATIENT
Start: 2022-02-15 | End: 2022-08-01 | Stop reason: SDUPTHER

## 2022-02-15 RX ORDER — POTASSIUM CHLORIDE 750 MG/1
20 TABLET, EXTENDED RELEASE ORAL DAILY
Qty: 180 TABLET | Refills: 3 | Status: SHIPPED | OUTPATIENT
Start: 2022-02-15 | End: 2022-08-01 | Stop reason: SDUPTHER

## 2022-02-15 RX ORDER — ATORVASTATIN CALCIUM 10 MG/1
10 TABLET, FILM COATED ORAL NIGHTLY
Qty: 90 TABLET | Refills: 4 | Status: SHIPPED | OUTPATIENT
Start: 2022-02-15 | End: 2022-08-01 | Stop reason: SDUPTHER

## 2022-02-15 ASSESSMENT — ENCOUNTER SYMPTOMS
EYES NEGATIVE: 1
SHORTNESS OF BREATH: 1
NAUSEA: 0
BACK PAIN: 1
BLOOD IN STOOL: 0
WHEEZING: 0
STRIDOR: 0
GASTROINTESTINAL NEGATIVE: 1
COUGH: 0
CHEST TIGHTNESS: 0

## 2022-02-15 NOTE — PROGRESS NOTES
Subsequent Progress Note  Patient: Den Martin  YOB: 1965  MRN: [id number]    Chief Complaint: [place] DHTN HPL   Chief Complaint   Patient presents with    Follow-up    Coronary Artery Disease    Hypertension   Dr. Blanca Lerma pt    CV Data:  12/21 Echo EF 60  6/11/21 Cath - Mild     Subjective/HPI:  Sob all the time. Right sided CP. Not dizzy no falls. occ palps. She stopped all her meds. Smoker  No etoh  Lives with boyfriend and grand kids. Disabled. EKG:    Past Medical History:   Diagnosis Date    Anxiety     Asthma     CAD (coronary artery disease)     [facility]est[facility]     Chronic back pain     COPD [facility]chronic obstructive pulmonary disease) [facility]     Depression     Hypertension     Obesity     Pelvic pain in female 2/15/2018    Type 2 diabetes mellitus without complication ([facility] [address] [facility] 1/4/1175       Past Surgical History:   Procedure Laterality Date    BLADDER SUSPENSION  02/2014    CARPAL TUNNEL RELEASE  89 or 90    right hand     HYSTERECTOMY  2009    full    FL CMBND ANTERPOST COLPORRAPHY W/CYSTO W/NTRCL RPR N/A 2/19/2018    EXCISION OF VAGINAL MESH, CYSTOSCOPY performed by Ricarda Nagel DO at [facility]      27 yrs ago       Family History   Problem Relation Age of Onset    Alzheimer's Disease Father     High Blood Pressure Father     Stroke Father         11 strokes in one year   Patrick Calderon Asthma Sister     Asthma Brother     No Known Problems Daughter     No Known Problems Daughter        Social History     Socioeconomic History    Marital status:       Spouse name: None    Number of children: None    Years of education: None    Highest education level: None   Occupational History    None   Tobacco Use    Smoking status: Current Every Day Smoker     Packs/day: 1.00     Years: 42.00     Pack years: 42.00     Types: Cigarettes     Start date: 5/6/1979    Smokeless tobacco: Never Used   Substance and Sexual Activity    Alcohol use: No    Drug use: No    Sexual activity: Yes     Partners: Male   Other Topics Concern    None   Social History Narrative    None     Social Determinants of Health     Financial Resource Strain: Low Risk     Difficulty of Paying Living Expenses: Not hard at all   Food Insecurity:     Worried About [address] in the Last Year: Not on file    Samantha of Food in the Last Year: Not on file   Transportation Needs:     Lack of Transportation (Medical): Not on file    Lack of Transportation (Non-Medical):  Not on file   Physical Activity:     Days of Exercise per Week: Not on file    Minutes of Exercise per Session: Not on file   Stress:     Feeling of Stress : Not on file   Social Connections:     Frequency of Communication with Friends and Family: Not on file    Frequency of Social Gatherings with Friends and Family: Not on file    Attends Shinto Services: Not on file    Active Member of [address] [place] [address] or Organizations: Not on file    Attends Club or Organization Meetings: Not on file    Marital Status: Not on file   Intimate Partner Violence:     Fear of Current or Ex-Partner: Not on file    Emotionally Abused: Not on file    Physically Abused: Not on file    Sexually Abused: Not on file   Housing Stability:     Unable to Pay for Housing in the Last Year: Not on file    [facility] of [facility] in the Last Year: Not on file    Unstable Housing in the Last Year: Not on file       Allergies   Allergen Reactions   [address]     Patient still eats strawberries    Other Rash     grass       Current Outpatient Medications   Medication Sig Dispense Refill    atorvastatin (LIPITOR) 10 MG tablet Take 1 tablet by mouth nightly 90 tablet 4    furosemide (LASIX) 20 MG tablet Take 1 tablet by mouth daily 60 tablet 3    potassium chloride (KLOR-CON M) 10 MEQ extended release tablet Take 2 tablets by mouth daily 180 tablet 3    carvedilol (COREG) 3.125 MG tablet TAKE 1 TABLET BY MOUTH TWICE A  tablet 3    furosemide (LASIX) 40 MG tablet Take 1 tablet by mouth daily 90 tablet 3    aspirin EC 81 MG EC tablet Take 1 tablet by mouth daily 90 tablet 1    ipratropium-albuterol (DUONEB) 0.5-2.5 (3) MG/3ML SOLN nebulizer solution Inhale 3 mLs into the lungs every 4 hours as needed for Shortness of Breath 360 mL 1    silver sulfADIAZINE (SILVADENE) 1 % cream Apply topically daily. 400 g 2    ergotamine-caffeine (CAFERGOT) 1-100 MG per tablet Two tablets at onset of attack; then 1 tablet every 30 minutes as needed; maximum: 6 tablets per attack; do not exceed 10 tablets/week. 30 tablet 0    albuterol sulfate  (90 Base) MCG/ACT inhaler Inhale 2 puffs into the lungs 2 times daily 2 Inhaler 3    guaiFENesin (ALTARUSSIN) 100 MG/5ML syrup Take 10 mLs by mouth every 4 hours as needed for Cough (Patient not taking: Reported on 2/15/2022) 355 mL 3    losartan (COZAAR) 25 MG tablet Take 1 tablet by mouth daily (Patient not taking: Reported on 2/15/2022) 30 tablet 3    oxybutynin (DITROPAN-XL) 5 MG extended release tablet TAKE 1 TABLET BY MOUTH EVERY DAY 90 tablet 1    Handicap Placard MISC by Does not apply route Good for 5 years. Expires 3/27/2026. 1 each 0    Misc. Devices (BARIATRIC ROLLATOR) MISC To help with ambulation related to CHF/shortness of breath with activity. 1 each [address] by Does not apply route 1 each 0     No current facility-administered medications for this visit. Review of Systems:   Review of Systems   Constitutional: Negative. Negative for diaphoresis and fatigue. HENT: Negative. Eyes: Negative. Respiratory: Positive for shortness of breath. Negative for cough, chest tightness, wheezing and stridor. Cardiovascular: Negative. Negative for chest pain, palpitations and leg swelling. Gastrointestinal: Negative. Negative for blood in stool and nausea. Genitourinary: Negative. Musculoskeletal: Positive for arthralgias and back pain.    Skin: Negative. Neurological: Negative. Negative for dizziness, syncope, weakness and light-headedness. Hematological: Negative. Psychiatric/Behavioral: Negative. Physical Examination:    /82 (Site: Right Lower Arm, Position: Sitting, Cuff Size: Medium Adult)   Pulse 97   Resp 18   Ht 5' 5\" (1.651 m)   Wt (!) 337 lb (152.9 kg)   LMP  (LMP Unknown)   SpO2 98%   BMI 56.08 kg/m²    Physical Exam   Constitutional: No distress. She appears chronically ill. HENT:   Normal cephalic and Atraumatic   Eyes: Pupils are equal, round, and reactive to light. Neck: Thyroid normal. No JVD present. No neck adenopathy. No thyromegaly present. Cardiovascular: Normal rate, regular rhythm, normal heart sounds, intact distal pulses and normal pulses. Pulmonary/Chest: Effort normal and breath sounds normal. She has no wheezes. She has no rales. She exhibits no tenderness. Abdominal: Soft. Bowel sounds are normal. There is no abdominal tenderness. Musculoskeletal:         General: Edema present. No tenderness. Normal range of motion. Cervical back: Normal range of motion and neck supple. Neurological: She is alert and oriented to person, place, and time. Skin: Skin is warm. No cyanosis. Nails show no clubbing.        LABS:  CBC:   Lab Results   Component Value Date    WBC 7.9 06/15/2021    RBC 5.08 06/15/2021    HGB 16.0 06/15/2021    HCT 49.6 06/15/2021    MCV 97.5 06/15/2021    MCH 31.5 06/15/2021    MCHC 32.3 06/15/2021    RDW 16.3 06/15/2021     06/15/2021    MPV 9.0 02/21/2014     Lipids:  Lab Results   Component Value Date    CHOL 138 06/15/2021    CHOL 200 (H) 03/26/2021    CHOL 181 01/24/2020     Lab Results   Component Value Date    TRIG 114 06/15/2021    TRIG 149 03/26/2021    TRIG 141 01/24/2020     Lab Results   Component Value Date    HDL 50 06/15/2021    HDL 57 03/26/2021    HDL 54 01/24/2020     Lab Results   Component Value Date    LDLCALC 65 06/15/2021    LDLCALC 113 03/26/2021    [address] 01/24/2020     No results found for: LABVLDL, VLDL  No results found for: CHOLHDLRATIO  CMP:    Lab Results   Component Value Date     12/07/2021    K 4.4 12/07/2021    K 3.8 06/15/2021     12/07/2021    CO2 26 12/07/2021    BUN 10 12/07/2021    CREATININE 0.80 12/07/2021    GFRAA >60.0 12/07/2021    LABGLOM >60.0 12/07/2021    GLUCOSE 95 12/07/2021    GLUCOSE 57 05/26/2020    PROT 7.7 12/07/2021    LABALBU 4.4 12/07/2021    CALCIUM 9.7 12/07/2021    BILITOT 0.4 12/07/2021    ALKPHOS 105 12/07/2021    AST 15 12/07/2021    ALT 16 12/07/2021     BMP:    Lab Results   Component Value Date     12/07/2021    K 4.4 12/07/2021    K 3.8 06/15/2021     12/07/2021    CO2 26 12/07/2021    BUN 10 12/07/2021    LABALBU 4.4 12/07/2021    CREATININE 0.80 12/07/2021    CALCIUM 9.7 12/07/2021    GFRAA >60.0 12/07/2021    LABGLOM >60.0 12/07/2021    GLUCOSE 95 12/07/2021    GLUCOSE 57 05/26/2020     Magnesium:    Lab Results   Component Value Date    MG 2.2 09/04/2013     TSH:  Lab Results   Component Value Date    TSH 2.307 09/04/2013       Patient Active Problem List   Diagnosis    Bilateral leg edema    KARISSA (obstructive sleep apnea)    SOB (shortness of breath)    Tobacco abuse    Tobacco abuse counseling    BMI 50.0-59.9, adult ([facility] [facility]    LVH (left ventricular hypertrophy)    Diastolic dysfunction    Essential hypertension    Coronary artery disease involving native coronary artery of native heart    Weight gain    Vitamin D deficiency    Depressed mood    Osteoarthritis of knee    Malaise and fatigue    Hypertensive heart disease with heart failure (HCC)    Generalized anxiety disorder    Gastro-esophageal reflux disease without esophagitis    Chronic obstructive pulmonary disease (HCC)    Cervical disc disorder    Hypoxia    Intertrigo    Dental caries    Urge incontinence of urine    Dysmetabolic syndrome    Prediabetes     Cellulitis of both lower extremities    Abnormal EKG    Acute on chronic diastolic heart failure (HCC)       Medications Discontinued During This Encounter   Medication Reason    methylPREDNISolone (MEDROL, ROSALINA,) 4 MG tablet LIST CLEANUP    meloxicam (MOBIC) 15 MG tablet     buPROPion (WELLBUTRIN XL) 150 MG extended release tablet     meloxicam (MOBIC) 15 MG tablet     diclofenac sodium (VOLTAREN) 1 % GEL     vitamin D (ERGOCALCIFEROL) 1.25 MG (58653 UT) CAPS capsule     atorvastatin (LIPITOR) 10 MG tablet REORDER    potassium chloride (KLOR-CON M) 10 MEQ extended release tablet REORDER    furosemide (LASIX) 20 MG tablet REORDER    carvedilol (COREG) 3.125 MG tablet REORDER       Modified Medications    Modified Medication Previous Medication    ATORVASTATIN (LIPITOR) 10 MG TABLET atorvastatin (LIPITOR) 10 MG tablet       Take 1 tablet by mouth nightly    Take 1 tablet by mouth nightly    CARVEDILOL (COREG) 3.125 MG TABLET carvedilol (COREG) 3.125 MG tablet       TAKE 1 TABLET BY MOUTH TWICE A DAY    TAKE 1 TABLET BY MOUTH TWICE A DAY    FUROSEMIDE (LASIX) 20 MG TABLET furosemide (LASIX) 20 MG tablet       Take 1 tablet by mouth daily    Take 1 tablet by mouth daily    POTASSIUM CHLORIDE (KLOR-CON M) 10 MEQ EXTENDED RELEASE TABLET potassium chloride (KLOR-CON M) 10 MEQ extended release tablet       Take 2 tablets by mouth daily    Take 2 tablets by mouth daily       Orders Placed This Encounter   Medications    atorvastatin (LIPITOR) 10 MG tablet     Sig: Take 1 tablet by mouth nightly     Dispense:  90 tablet     Refill:  4    furosemide (LASIX) 20 MG tablet     Sig: Take 1 tablet by mouth daily     Dispense:  60 tablet     Refill:  3    potassium chloride (KLOR-CON M) 10 MEQ extended release tablet     Sig: Take 2 tablets by mouth daily     Dispense:  180 tablet     Refill:  3    carvedilol (COREG) 3.125 MG tablet     Sig: TAKE 1 TABLET BY MOUTH TWICE A DAY     Dispense:  180 tablet     Refill:  3    furosemide (LASIX) 40 MG tablet     Sig: Take 1 tablet by mouth daily     Dispense:  90 tablet     Refill:  3    aspirin EC 81 MG EC tablet     Sig: Take 1 tablet by mouth daily     Dispense:  90 tablet     Refill:  1       Assessment/Plan:    1. Coronary artery disease involving native coronary artery of native heart, unspecified whether angina present  She has mid dz but many risk factors. She will resume ASA Coreg and Statin     2. Essential hypertension   continue coreg- low salt salt     3. LVH (left ventricular hypertrophy)       4. Diastolic dysfunction     - furosemide (LASIX) 20 MG tablet; Take 1 tablet by mouth daily  Dispense: 60 tablet; Refill: 3    5. Edema of both legs   Resume Lasix 40 qd. - potassium chloride (KLOR-CON M) 10 MEQ extended release tablet; Take 2 tablets by mouth daily  Dispense: 180 tablet; Refill: 3    6. BMI 50.0-59.9, adult (HCC)     - atorvastatin (LIPITOR) 10 MG tablet; Take 1 tablet by mouth nightly  Dispense: 90 tablet; Refill: 4    7. Coronary artery disease involving native coronary artery of native heart     - atorvastatin (LIPITOR) 10 MG tablet; Take 1 tablet by mouth nightly  Dispense: 90 tablet; Refill: 4    8. Dysmetabolic syndrome     - atorvastatin (LIPITOR) 10 MG tablet; Take 1 tablet by mouth nightly  Dispense: 90 tablet; Refill: 4    9. Essential (primary) hypertension     - carvedilol (COREG) 3.125 MG tablet; TAKE 1 TABLET BY MOUTH TWICE A DAY  Dispense: 180 tablet; Refill: 3    10. Atherosclerotic heart disease of native coronary artery without angina pectoris     - carvedilol (COREG) 3.125 MG tablet; TAKE 1 TABLET BY MOUTH TWICE A DAY  Dispense: 180 tablet; Refill: 3    11. Dyslipidemia   statin. Low fat diet. Counseling:  Heart Healthy Lifestyle, Improve BMI, Low Salt Diet, Take Precautions to Prevent Falls and Walk Daily    Return in about 4 months (around 6/15/2022).       Electronically signed by Bharti Olson MD on 2/15/2022 at 3:09 PM

## 2022-02-17 ENCOUNTER — TELEPHONE (OUTPATIENT)
Dept: CARDIOLOGY CLINIC | Age: 57
End: 2022-02-17

## 2022-02-17 NOTE — TELEPHONE ENCOUNTER
Patient picked up prescriptions-Lasik (20MG) and Lasik (40MG)-should she take them together/same time of day?   Please call and advise

## 2022-02-24 ENCOUNTER — OFFICE VISIT (OUTPATIENT)
Dept: FAMILY MEDICINE CLINIC | Age: 57
End: 2022-02-24
Payer: MEDICARE

## 2022-02-24 VITALS
SYSTOLIC BLOOD PRESSURE: 112 MMHG | HEART RATE: 90 BPM | WEIGHT: 293 LBS | HEIGHT: 65 IN | TEMPERATURE: 97.1 F | BODY MASS INDEX: 48.82 KG/M2 | DIASTOLIC BLOOD PRESSURE: 82 MMHG | OXYGEN SATURATION: 95 % | RESPIRATION RATE: 16 BRPM

## 2022-02-24 DIAGNOSIS — G89.29 CHRONIC DENTAL PAIN: ICD-10-CM

## 2022-02-24 DIAGNOSIS — I10 ESSENTIAL HYPERTENSION: ICD-10-CM

## 2022-02-24 DIAGNOSIS — K08.9 CHRONIC DENTAL PAIN: ICD-10-CM

## 2022-02-24 DIAGNOSIS — I51.89 DIASTOLIC DYSFUNCTION: ICD-10-CM

## 2022-02-24 DIAGNOSIS — Z20.822 COVID-19 RULED OUT: ICD-10-CM

## 2022-02-24 DIAGNOSIS — E88.81 DYSMETABOLIC SYNDROME: ICD-10-CM

## 2022-02-24 DIAGNOSIS — I25.10 CORONARY ARTERY DISEASE INVOLVING NATIVE CORONARY ARTERY OF NATIVE HEART, UNSPECIFIED WHETHER ANGINA PRESENT: ICD-10-CM

## 2022-02-24 DIAGNOSIS — Z12.31 ENCOUNTER FOR SCREENING MAMMOGRAM FOR MALIGNANT NEOPLASM OF BREAST: ICD-10-CM

## 2022-02-24 DIAGNOSIS — I11.0 HYPERTENSIVE HEART DISEASE WITH HEART FAILURE (HCC): ICD-10-CM

## 2022-02-24 PROBLEM — R09.02 HYPOXIA: Status: RESOLVED | Noted: 2020-06-19 | Resolved: 2022-02-24

## 2022-02-24 PROBLEM — L03.115 CELLULITIS OF BOTH LOWER EXTREMITIES: Status: RESOLVED | Noted: 2021-03-27 | Resolved: 2022-02-24

## 2022-02-24 PROBLEM — L03.116 CELLULITIS OF BOTH LOWER EXTREMITIES: Status: RESOLVED | Noted: 2021-03-27 | Resolved: 2022-02-24

## 2022-02-24 PROBLEM — R63.5 WEIGHT GAIN: Status: RESOLVED | Noted: 2020-01-29 | Resolved: 2022-02-24

## 2022-02-24 PROBLEM — R73.03 PREDIABETES: Status: RESOLVED | Noted: 2021-03-27 | Resolved: 2022-02-24

## 2022-02-24 LAB
BASOPHILS ABSOLUTE: 0.1 K/UL (ref 0–0.2)
BASOPHILS RELATIVE PERCENT: 0.9 %
EOSINOPHILS ABSOLUTE: 0.2 K/UL (ref 0–0.7)
EOSINOPHILS RELATIVE PERCENT: 1.9 %
HCT VFR BLD CALC: 42.8 % (ref 37–47)
HEMOGLOBIN: 14.1 G/DL (ref 12–16)
LYMPHOCYTES ABSOLUTE: 2.3 K/UL (ref 1–4.8)
LYMPHOCYTES RELATIVE PERCENT: 26.8 %
MCH RBC QN AUTO: 29.8 PG (ref 27–31.3)
MCHC RBC AUTO-ENTMCNC: 32.8 % (ref 33–37)
MCV RBC AUTO: 90.9 FL (ref 82–100)
MONOCYTES ABSOLUTE: 0.5 K/UL (ref 0.2–0.8)
MONOCYTES RELATIVE PERCENT: 5.6 %
NEUTROPHILS ABSOLUTE: 5.5 K/UL (ref 1.4–6.5)
NEUTROPHILS RELATIVE PERCENT: 64.8 %
PDW BLD-RTO: 14.3 % (ref 11.5–14.5)
PLATELET # BLD: 212 K/UL (ref 130–400)
RBC # BLD: 4.71 M/UL (ref 4.2–5.4)
WBC # BLD: 8.5 K/UL (ref 4.8–10.8)

## 2022-02-24 PROCEDURE — 99214 OFFICE O/P EST MOD 30 MIN: CPT | Performed by: PHYSICIAN ASSISTANT

## 2022-02-24 RX ORDER — OXYCODONE HYDROCHLORIDE AND ACETAMINOPHEN 5; 325 MG/1; MG/1
1 TABLET ORAL EVERY 6 HOURS PRN
Qty: 12 TABLET | Refills: 0 | Status: SHIPPED | OUTPATIENT
Start: 2022-02-24 | End: 2022-02-27

## 2022-02-24 RX ORDER — OXYCODONE HYDROCHLORIDE AND ACETAMINOPHEN 5; 325 MG/1; MG/1
1 TABLET ORAL EVERY 6 HOURS PRN
Qty: 12 TABLET | Refills: 0 | Status: CANCELLED | OUTPATIENT
Start: 2022-02-24 | End: 2022-02-27

## 2022-02-24 ASSESSMENT — ENCOUNTER SYMPTOMS
SHORTNESS OF BREATH: 1
ABDOMINAL DISTENTION: 0
ABDOMINAL PAIN: 0
COUGH: 0
COLOR CHANGE: 0
FACIAL SWELLING: 0
BACK PAIN: 1
CHEST TIGHTNESS: 0
WHEEZING: 0

## 2022-02-24 NOTE — PROGRESS NOTES
Subjective  Ashley Lesser, 55 y.o. female presents today with:  Chief Complaint   Patient presents with    COPD     follow up     HPI  Adam Osorio is in the office today for routine follow up. Last OV with me: 4/13/21  Has seen other primary care in the interim. Overall, she is feeling well. No acute questions or concerns. Has not received COVID vaccine; believes she did have COVID in December, 2021. Would like Ab testing, if possible. Remains compliant with her medications. Had [facility] 6/21 with Dr. Mika Tejada. Did not have any stents, was told to optimize heart health with nutrition and medication. Denies Cp, dizziness, sob. Little/no edema. Last f/u was 2/15/22 with Dr. Earl Alas. Smoking 1-1/2 PPD. Restarted her medications. Due for mammogram--agreeable to order. Scheduled to have total tooth extraction 3/8/22. Asking for pain medication, PRN. Review of Systems   Constitutional: Negative for activity change, appetite change, chills, diaphoresis, fatigue, fever and unexpected weight change. HENT: Positive for dental problem. Negative for facial swelling. Respiratory: Positive for shortness of breath (improved with oxygen). Negative for cough, chest tightness and wheezing. Cardiovascular: Positive for leg swelling. Negative for chest pain and palpitations. Gastrointestinal: Negative for abdominal distention and abdominal pain. Genitourinary: Negative for difficulty urinating, dysuria, hematuria, pelvic pain, urgency and vaginal pain. Musculoskeletal: Positive for arthralgias, back pain and gait problem. Negative for myalgias. Chronic   Skin: Negative for color change and rash. Neurological: Positive for weakness and numbness (BLE). Negative for dizziness, light-headedness and headaches. Psychiatric/Behavioral: Positive for sleep disturbance (improving.). Negative for dysphoric mood. The patient is not nervous/anxious.       Past Medical History:   Diagnosis Date    Anxiety     Asthma     CAD (coronary artery disease)     CHF (congestive heart failure) (HCC)     Chronic back pain     [facility]chronic obstructive [facility] disease) [facility]     Depression     Hypertension     Obesity     Pelvic pain in female 2/15/2018    Type 2 diabetes mellitus without complication ([facility] 1/7/3111     Past Surgical History:   Procedure Laterality Date    BLADDER SUSPENSION  02/2014    CARPAL TUNNEL RELEASE  89 or 90    right hand     HYSTERECTOMY  2009    full    [place] CMBND ANTERPOST COLPORRAPHY W/CYSTO W/NTRCL RPR N/A 2/19/2018    EXCISION OF VAGINAL MESH, CYSTOSCOPY performed by Shalini Carmichael DO at [facility]      27 yrs ago     Social History     Socioeconomic History    Marital status:      Spouse name: Not on file    Number of children: Not on file    Years of education: Not on file    Highest education level: Not on file   Occupational History    Not on file   Tobacco Use    Smoking status: Current Every Day Smoker     Packs/day: 1.00     Years: 42.00     Pack years: 42.00     Types: Cigarettes     Start date: 5/6/1979    Smokeless tobacco: Never Used   Substance and Sexual Activity    Alcohol use: No    Drug use: No    Sexual activity: Yes     Partners: Male   Other Topics Concern    Not on file   Social History Narrative    Not on file     Social Determinants of Health     Financial Resource Strain: Low Risk     Difficulty of Paying Living Expenses: Not hard at all   Food Insecurity:     Worried About [address] in the Last Year: Not on file    [address] in the Last Year: Not on file   Transportation Needs:     Lack of Transportation (Medical): Not on file    Lack of Transportation (Non-Medical):  Not on file   Physical Activity:     Days of Exercise per Week: Not on file    Minutes of Exercise per Session: Not on file   Stress:     Feeling of Stress : Not on file   Social Connections:     Frequency of Communication with Friends and Family: Not on file    Frequency of Social Gatherings with Friends and Family: Not on file    Attends Restorationist Services: Not on file    Active Member of Clubs or Organizations: Not on file    Attends Club or Organization Meetings: Not on file    Marital Status: Not on file   Intimate Partner Violence:     Fear of Current or Ex-Partner: Not on file    Emotionally Abused: Not on file    Physically Abused: Not on file    Sexually Abused: Not on file   Housing Stability:     Unable to Pay for Housing in the Last Year: Not on file    Number of [place] in the Last Year: Not on file    Unstable Housing in the Last Year: Not on file     Family History   Problem Relation Age of Onset    Alzheimer's Disease Father     High Blood Pressure Father     Stroke Father         11 strokes in one year    Asthma Sister     Asthma Brother     No Known Problems Daughter     No Known Problems Daughter      Allergies   Allergen Reactions   [address]     Patient still eats strawberries    Other Rash     grass     Current Outpatient Medications   Medication Sig Dispense Refill    oxyCODONE-acetaminophen (PERCOCET) 5-325 MG per tablet Take 1 tablet by mouth every 6 hours as needed for Pain for up to 3 days.  12 tablet 0    atorvastatin (LIPITOR) 10 MG tablet Take 1 tablet by mouth nightly 90 tablet 4    potassium chloride (KLOR-CON M) 10 MEQ extended release tablet Take 2 tablets by mouth daily 180 tablet 3    carvedilol (COREG) 3.125 MG tablet TAKE 1 TABLET BY MOUTH TWICE A  tablet 3    furosemide (LASIX) 40 MG tablet Take 1 tablet by mouth daily 90 tablet 3    aspirin EC 81 MG EC tablet Take 1 tablet by mouth daily 90 tablet 1    oxybutynin (DITROPAN-XL) 5 MG extended release tablet TAKE 1 TABLET BY MOUTH EVERY DAY 90 tablet 1    ipratropium-albuterol (DUONEB) 0.5-2.5 (3) MG/3ML SOLN nebulizer solution Inhale 3 mLs into the lungs every 4 hours as needed for Shortness of Breath 360 mL 1    Handicap Placard MISC by Does not apply route Good for 5 years. Expires 3/27/2026. 1 each 0    Misc. Devices (BARIATRIC ROLLATOR) MISC To help with ambulation related to CHF/shortness of breath with activity. 1 each 0    silver sulfADIAZINE (SILVADENE) 1 % cream Apply topically daily. 400 g [address] by Does not apply route 1 each 0    albuterol sulfate  (90 Base) MCG/ACT inhaler Inhale 2 puffs into the lungs 2 times daily 2 Inhaler 3     No current facility-administered medications for this visit. PMH, Surgical Hx, Family Hx, and Social Hx reviewed and updated. Health Maintenance reviewed. Objective  Vitals:    02/24/22 1340   BP: 112/82   Site: Left Lower Arm   Position: Sitting   Cuff Size: Medium Adult   Pulse: 90   Resp: 16   Temp: 97.1 °F (36.2 °C)   TempSrc: Temporal   SpO2: 95%   Weight: (!) 338 lb (153.3 kg)   Height: 5' 5\" (1.651 m)     BP Readings from Last 3 Encounters:   02/24/22 112/82   02/15/22 132/82   12/07/21 (!) 150/90     Wt Readings from Last 3 Encounters:   02/24/22 (!) 338 lb (153.3 kg)   02/15/22 (!) 337 lb (152.9 kg)   12/07/21 (!) 346 lb (156.9 kg)     Physical Exam  Vitals reviewed. Constitutional:       General: She is not in acute distress. Appearance: She is obese. She is not ill-appearing, toxic-appearing or diaphoretic. HENT:      Head: Normocephalic and atraumatic. Right Ear: Tympanic membrane, ear canal and external ear normal. There is no impacted cerumen. Left Ear: Tympanic membrane, ear canal and external ear normal. There is no impacted cerumen. Mouth/Throat:      Mouth: Mucous membranes are moist.      Pharynx: Oropharynx is clear. Eyes:      Conjunctiva/sclera: Conjunctivae normal.   Cardiovascular:      Rate and Rhythm: Normal rate and regular rhythm. Heart sounds: Normal heart sounds. No murmur heard. Pulmonary:      Effort: Pulmonary effort is normal. No respiratory distress.       Breath sounds: Normal breath sounds. No stridor. No wheezing or rhonchi. Abdominal:      Comments: Obese abdomen   Musculoskeletal:         General: No tenderness. Right lower leg: Edema present. Left lower leg: Edema present. Skin:     General: Skin is warm. Coloration: Skin is not jaundiced or pale. Findings: No bruising, erythema or rash. Neurological:      General: No focal deficit present. Mental Status: She is alert and oriented to person, place, and time. Psychiatric:         Attention and Perception: Attention normal.         Mood and Affect: Mood and affect normal.         Speech: Speech normal.         Behavior: Behavior normal.         Cognition and Memory: Cognition normal.         Judgment: Judgment normal.      Comments:         Assessment & Plan   Inessa Epps was seen today for copd. Diagnoses and all orders for this visit:    BMI 50.0-59.9, adult ([facility]  -     CBC with Auto Differential; Future    Encounter for screening mammogram for malignant neoplasm of breast  -     JAMIR DIGITAL SCREEN W OR WO CAD BILATERAL; Future    COVID-19 ruled out  -     Cancel: Covid-19, Antibody; Future    Chronic dental pain  -     oxyCODONE-acetaminophen (PERCOCET) 5-325 MG per tablet; Take 1 tablet by mouth every 6 hours as needed for Pain for up to 3 days. Hypertensive heart disease with heart failure (HCC)    Essential hypertension    Diastolic dysfunction    Coronary artery disease involving native coronary artery of native heart, unspecified whether angina present    Dysmetabolic syndrome    Continue all medications. Did not encourage stopping any medications with consulting me or specialty. 3 month follow up.     Orders Placed This Encounter   Procedures    JAMIR DIGITAL SCREEN W OR WO CAD BILATERAL     Standing Status:   Future     Standing Expiration Date:   2/24/2023     Order Specific Question:   Reason for exam:     Answer:   screening    CBC with Auto Differential     Standing Status: Future     Number of Occurrences:   1     Standing Expiration Date:   2/24/2023     Orders Placed This Encounter   Medications    oxyCODONE-acetaminophen (PERCOCET) 5-325 MG per tablet     Sig: Take 1 tablet by mouth every 6 hours as needed for Pain for up to 3 days. Dispense:  12 tablet     Refill:  0     Reduce doses taken as pain becomes manageable     Medications Discontinued During This Encounter   Medication Reason    guaiFENesin (ALTARUSSIN) 100 MG/5ML syrup LIST CLEANUP    losartan (COZAAR) 25 MG tablet LIST CLEANUP    ergotamine-caffeine (CAFERGOT) 1-100 MG per tablet LIST CLEANUP    oxyCODONE-acetaminophen (PERCOCET) 5-325 MG per tablet REORDER     No follow-ups on file. Reviewed with the patient: current clinical status, medications, activities and diet. Side effects, adverse effects of the medication prescribed today, as well as treatment plan/ rationale and result expectations have been discussed with the patient who expresses understanding and desires to proceed. Close follow up to evaluate treatment results and for coordination of care. I have reviewed the patient's medical history in detail and updated the computerized patient record.     Zoie Ervin PA-C

## 2022-02-25 LAB — SARS-COV-2 ANTIBODY, TOTAL: POSITIVE

## 2022-03-17 ENCOUNTER — TELEPHONE (OUTPATIENT)
Dept: FAMILY MEDICINE CLINIC | Age: 57
End: 2022-03-17

## 2022-03-17 RX ORDER — IBUPROFEN 800 MG/1
800 TABLET ORAL EVERY 8 HOURS PRN
Qty: 60 TABLET | Refills: 0 | Status: SHIPPED | OUTPATIENT
Start: 2022-03-17 | End: 2022-08-29 | Stop reason: SDUPTHER

## 2022-03-17 NOTE — TELEPHONE ENCOUNTER
Patient is calling to ask if pcp would send a script for ibuprofen 800. She states she had her teeth pulled a week ago and was only given 5 ibuprofen. She mentioned she was given percocet in the past from pcp for her dental pain, but does not want to take those. Please advise and thank you.      [facility], [phone]

## 2022-03-25 DIAGNOSIS — G47.33 OSA (OBSTRUCTIVE SLEEP APNEA): Primary | ICD-10-CM

## 2022-06-13 ENCOUNTER — SCHEDULED TELEPHONE ENCOUNTER (OUTPATIENT)
Dept: FAMILY MEDICINE CLINIC | Age: 57
End: 2022-06-13
Payer: MEDICARE

## 2022-06-13 DIAGNOSIS — I11.0 HYPERTENSIVE HEART DISEASE WITH HEART FAILURE (HCC): ICD-10-CM

## 2022-06-13 DIAGNOSIS — I10 ESSENTIAL HYPERTENSION: ICD-10-CM

## 2022-06-13 DIAGNOSIS — I25.10 CORONARY ARTERY DISEASE INVOLVING NATIVE CORONARY ARTERY OF NATIVE HEART, UNSPECIFIED WHETHER ANGINA PRESENT: ICD-10-CM

## 2022-06-13 DIAGNOSIS — J44.1 COPD EXACERBATION (HCC): Primary | ICD-10-CM

## 2022-06-13 PROBLEM — Z20.822 COVID-19 RULED OUT: Status: RESOLVED | Noted: 2022-02-24 | Resolved: 2022-06-13

## 2022-06-13 PROCEDURE — 99214 OFFICE O/P EST MOD 30 MIN: CPT | Performed by: PHYSICIAN ASSISTANT

## 2022-06-13 RX ORDER — DEXAMETHASONE 6 MG/1
6 TABLET ORAL
Qty: 5 TABLET | Refills: 0 | Status: SHIPPED | OUTPATIENT
Start: 2022-06-13 | End: 2022-06-18

## 2022-06-13 RX ORDER — GUAIFENESIN 600 MG/1
600 TABLET, EXTENDED RELEASE ORAL 2 TIMES DAILY
Qty: 30 TABLET | Refills: 0 | Status: SHIPPED | OUTPATIENT
Start: 2022-06-13 | End: 2022-06-28

## 2022-06-13 RX ORDER — AZITHROMYCIN 250 MG/1
TABLET, FILM COATED ORAL
Qty: 1 PACKET | Refills: 0 | Status: SHIPPED | OUTPATIENT
Start: 2022-06-13

## 2022-06-13 SDOH — ECONOMIC STABILITY: FOOD INSECURITY: WITHIN THE PAST 12 MONTHS, YOU WORRIED THAT YOUR FOOD WOULD RUN OUT BEFORE YOU GOT MONEY TO BUY MORE.: NEVER TRUE

## 2022-06-13 SDOH — ECONOMIC STABILITY: FOOD INSECURITY: WITHIN THE PAST 12 MONTHS, THE FOOD YOU BOUGHT JUST DIDN'T LAST AND YOU DIDN'T HAVE MONEY TO GET MORE.: NEVER TRUE

## 2022-06-13 ASSESSMENT — ENCOUNTER SYMPTOMS
COLOR CHANGE: 0
SHORTNESS OF BREATH: 1
SINUS PRESSURE: 1
WHEEZING: 0
ABDOMINAL DISTENTION: 0
COUGH: 1
BACK PAIN: 1
CHEST TIGHTNESS: 0
ABDOMINAL PAIN: 0
FACIAL SWELLING: 0

## 2022-06-13 ASSESSMENT — SOCIAL DETERMINANTS OF HEALTH (SDOH): HOW HARD IS IT FOR YOU TO PAY FOR THE VERY BASICS LIKE FOOD, HOUSING, MEDICAL CARE, AND HEATING?: NOT HARD AT ALL

## 2022-06-13 ASSESSMENT — PATIENT HEALTH QUESTIONNAIRE - PHQ9
SUM OF ALL RESPONSES TO PHQ QUESTIONS 1-9: 0
2. FEELING DOWN, DEPRESSED OR HOPELESS: 0
SUM OF ALL RESPONSES TO PHQ9 QUESTIONS 1 & 2: 0
SUM OF ALL RESPONSES TO PHQ QUESTIONS 1-9: 0
SUM OF ALL RESPONSES TO PHQ QUESTIONS 1-9: 0
1. LITTLE INTEREST OR PLEASURE IN DOING THINGS: 0
SUM OF ALL RESPONSES TO PHQ QUESTIONS 1-9: 0

## 2022-06-13 NOTE — PROGRESS NOTES
2022    TELEHEALTH EVALUATION -- Audio/Visual (During TLQUE-85 public health emergency)    Due to [place] 19 outbreak, patient's office visit was converted to a virtual visit. Patient was contacted and agreed to proceed with a virtual visit via Telephone Visit--total length of visit 15 minutes   The risks and benefits of converting to a virtual visit were discussed in light of the current infectious disease epidemic. Patient also understood that insurance coverage and co-pays are up to their individual insurance plans. HPI:    Talon Stiles (:  1965) has requested an audio/video evaluation for the following concern(s):    VV today for follow up. History of COPD.   +Cough/congestion. X 5 days. Yellow/green drainage. Negative COVID with home testing. Using inhalers and nebulizers to help. Continues with symptoms. No fever. Has upcoming visit with cardiology on 22. She has been compliant with her medications. Denies CP, edema of LEs. No dizziness/sob. Will be due for labs. Requesting refills of her chronic medications. Review of Systems   Constitutional: Negative for activity change, appetite change, chills, diaphoresis, fatigue, fever and unexpected weight change. HENT: Positive for congestion, dental problem, postnasal drip and sinus pressure. Negative for facial swelling. Respiratory: Positive for cough and shortness of breath (improved with oxygen). Negative for chest tightness and wheezing. Cardiovascular: Negative for chest pain, palpitations and leg swelling. Gastrointestinal: Negative for abdominal distention and abdominal pain. Genitourinary: Negative for difficulty urinating, dysuria, hematuria, pelvic pain, urgency and vaginal pain. Musculoskeletal: Positive for arthralgias, back pain and gait problem. Negative for myalgias. Chronic   Skin: Negative for color change and rash. Neurological: Positive for weakness and numbness (BLE). Negative for dizziness, light-headedness and headaches. Psychiatric/Behavioral: Positive for sleep disturbance (improving.). Negative for dysphoric mood. The patient is not nervous/anxious. Prior to Visit Medications    Medication Sig Taking? Authorizing Provider   azithromycin (ZITHROMAX Z-ROSALINA) 250 MG tablet Take 2 pills together on the first day. Take 1 pill a day for the remaining 4 days. Yes Zoie Ervin PA-C   dexamethasone (DECADRON) 6 MG tablet Take 1 tablet by mouth daily (with breakfast) for 5 days Yes Zoie Ervin PA-C   guaiFENesin (MUCINEX) 600 MG extended release tablet Take 1 tablet by mouth 2 times daily for 15 days Yes Zoie Ervin PA-C   ibuprofen (ADVIL;MOTRIN) 800 MG tablet Take 1 tablet by mouth every 8 hours as needed for Pain Yes Zoie Ervin PA-C   atorvastatin (LIPITOR) 10 MG tablet Take 1 tablet by mouth nightly Yes Sd Jung MD   potassium chloride (KLOR-CON M) 10 MEQ extended release tablet Take 2 tablets by mouth daily Yes Sd Jung MD   carvedilol (COREG) 3.125 MG tablet TAKE 1 TABLET BY MOUTH TWICE A DAY Yes Sd Jung MD   furosemide (LASIX) 40 MG tablet Take 1 tablet by mouth daily Yes Sd Jung MD   aspirin EC 81 MG EC tablet Take 1 tablet by mouth daily Yes Sd Jung MD   oxybutynin (DITROPAN-XL) 5 MG extended release tablet TAKE 1 TABLET BY MOUTH EVERY DAY Yes Zoie Ervin PA-C   ipratropium-albuterol (DUONEB) 0.5-2.5 (3) MG/3ML SOLN nebulizer solution Inhale 3 mLs into the lungs every 4 hours as needed for Shortness of Breath Yes Zehra Toro PA-C   Handicap Placard MISC by Does not apply route Good for 5 years. Expires 3/27/2026. Yes CYN [place]. Devices (BARIATRIC ROLLATOR) MISC To help with ambulation related to CHF/shortness of breath with activity. Yes Zoie Ervin PA-C   silver sulfADIAZINE (SILVADENE) 1 % cream Apply topically daily.  Yes [address] [place] [address] by Does not apply route Yes Veronica Cuevas CYN   albuterol sulfate  (90 Base) MCG/ACT inhaler Inhale 2 puffs into the lungs 2 times daily Yes Zoie Ervin PA-C       Social History     Tobacco Use    Smoking status: Current Every Day Smoker     Packs/day: 1.00     Years: 42.00     Pack years: 42.00     Types: Cigarettes     Start date: 5/6/1979    Smokeless tobacco: Never Used   Substance Use Topics    Alcohol use: No    Drug use: No        Allergies   Allergen Reactions    Food Hives     Patient still eats strawberries    Other Rash     grass   ,   Past Medical History:   Diagnosis Date    Anxiety     Asthma     CAD (coronary artery disease)     [facility]     Chronic back pain     COPD (chronic obstructive [facility] disease) ([facility]     Depression     Hypertension     Obesity     Pelvic pain in female 2/15/2018    Type 2 diabetes mellitus without complication ([facility] 1/5/9955   ,   Past Surgical History:   Procedure Laterality Date    BLADDER SUSPENSION  02/2014    CARPAL TUNNEL RELEASE  89 or 90    right hand     HYSTERECTOMY (CERVIX STATUS UNKNOWN)  2009    full    [place] CMBND ANTERPOST COLPORRAPHY W/CYSTO W/NTRCL RPR N/A 2/19/2018    EXCISION OF VAGINAL MESH, CYSTOSCOPY performed by Srinivasa Swan DO at [facility]      27 yrs ago   ,   Social History     Tobacco Use    Smoking status: Current Every Day Smoker     Packs/day: 1.00     Years: 42.00     Pack years: 42.00     Types: Cigarettes     Start date: 5/6/1979    Smokeless tobacco: Never Used   Substance Use Topics    Alcohol use: No    Drug use: No   ,   Family History   Problem Relation Age of Onset    Alzheimer's Disease Father     High Blood Pressure Father     Stroke Father         11 strokes in one year   Bernestine Bruce Asthma Sister     Asthma Brother     No Known Problems Daughter     No Known Problems Daughter    ,   There is no immunization history on file for this patient.,   Health Maintenance   Topic Date Due    COVID-19 heart, unspecified whether angina present      3. Hypertensive heart disease with heart failure ([facility]      4. Essential hypertension    Start zpak, steroid and mucinex. Continue chronic medications. Instructed to continue nebulizers/breathing treatments. Follow up with me in 3 months; sooner for any acute problems or concerns. No follow-ups on file. An  electronic signature was used to authenticate this note. --Veronica Cuevas PA-C on 6/13/2022 at 1:16 PM        Pursuant to the emergency declaration under the [facility], [facility] waiver authority and the [employer] and [employer] Act, this Virtual  Visit was conducted, with patient's consent, to reduce the patient's risk of exposure to COVID-19 and provide continuity of care for an established patient. Services were provided through a video synchronous discussion virtually to substitute for in-person clinic visit.

## 2022-06-16 ENCOUNTER — TELEPHONE (OUTPATIENT)
Dept: FAMILY MEDICINE CLINIC | Age: 57
End: 2022-06-16

## 2022-06-23 ENCOUNTER — TELEPHONE (OUTPATIENT)
Dept: FAMILY MEDICINE CLINIC | Age: 57
End: 2022-06-23

## 2022-08-01 ENCOUNTER — TELEPHONE (OUTPATIENT)
Dept: CARDIOLOGY CLINIC | Age: 57
End: 2022-08-01

## 2022-08-01 ENCOUNTER — OFFICE VISIT (OUTPATIENT)
Dept: CARDIOLOGY CLINIC | Age: 57
End: 2022-08-01
Payer: MEDICARE

## 2022-08-01 VITALS
HEIGHT: 65 IN | SYSTOLIC BLOOD PRESSURE: 134 MMHG | HEART RATE: 91 BPM | DIASTOLIC BLOOD PRESSURE: 86 MMHG | RESPIRATION RATE: 17 BRPM | WEIGHT: 293 LBS | BODY MASS INDEX: 48.82 KG/M2 | OXYGEN SATURATION: 92 %

## 2022-08-01 DIAGNOSIS — R60.0 LEG EDEMA: ICD-10-CM

## 2022-08-01 DIAGNOSIS — E88.81 DYSMETABOLIC SYNDROME: ICD-10-CM

## 2022-08-01 DIAGNOSIS — I10 ESSENTIAL HYPERTENSION: Primary | ICD-10-CM

## 2022-08-01 DIAGNOSIS — E78.5 DYSLIPIDEMIA: ICD-10-CM

## 2022-08-01 DIAGNOSIS — R60.0 EDEMA OF BOTH LEGS: ICD-10-CM

## 2022-08-01 DIAGNOSIS — I10 ESSENTIAL (PRIMARY) HYPERTENSION: ICD-10-CM

## 2022-08-01 PROCEDURE — 99214 OFFICE O/P EST MOD 30 MIN: CPT | Performed by: INTERNAL MEDICINE

## 2022-08-01 PROCEDURE — 93000 ELECTROCARDIOGRAM COMPLETE: CPT | Performed by: INTERNAL MEDICINE

## 2022-08-01 RX ORDER — ASPIRIN 81 MG/1
81 TABLET ORAL DAILY
Qty: 90 TABLET | Refills: 3 | Status: SHIPPED | OUTPATIENT
Start: 2022-08-01

## 2022-08-01 RX ORDER — ATORVASTATIN CALCIUM 10 MG/1
10 TABLET, FILM COATED ORAL NIGHTLY
Qty: 90 TABLET | Refills: 4 | Status: SHIPPED | OUTPATIENT
Start: 2022-08-01 | End: 2022-08-26 | Stop reason: SDUPTHER

## 2022-08-01 RX ORDER — POTASSIUM CHLORIDE 750 MG/1
20 TABLET, EXTENDED RELEASE ORAL DAILY
Qty: 180 TABLET | Refills: 3 | Status: SHIPPED | OUTPATIENT
Start: 2022-08-01 | End: 2022-08-29 | Stop reason: SDUPTHER

## 2022-08-01 RX ORDER — FUROSEMIDE 40 MG/1
40 TABLET ORAL DAILY
Qty: 90 TABLET | Refills: 3 | Status: SHIPPED | OUTPATIENT
Start: 2022-08-01 | End: 2022-08-26 | Stop reason: SDUPTHER

## 2022-08-01 RX ORDER — CARVEDILOL 3.12 MG/1
TABLET ORAL
Qty: 180 TABLET | Refills: 3 | Status: SHIPPED | OUTPATIENT
Start: 2022-08-01 | End: 2022-08-29 | Stop reason: SDUPTHER

## 2022-08-01 ASSESSMENT — ENCOUNTER SYMPTOMS
CHEST TIGHTNESS: 0
BLOOD IN STOOL: 0
EYES NEGATIVE: 1
GASTROINTESTINAL NEGATIVE: 1
WHEEZING: 0
COUGH: 0
BACK PAIN: 1
NAUSEA: 0
SHORTNESS OF BREATH: 1
STRIDOR: 0

## 2022-08-01 NOTE — PROGRESS NOTES
Subsequent Progress Note  Patient: Zelda Sun  YOB: 1965  MRN: [id number]    Chief Complaint: CA DHTN HPL   Chief Complaint   Patient presents with    Follow-up     4 month     Coronary Artery Disease    Hypertension   Dr. Annie Martin pt    CV Data:  12/21 Echo EF 60  6/11/21 Cath - Mild     Subjective/HPI:  Sob all the time. Right sided CP. Not dizzy no falls. occ palps. She stopped all her meds. 8/1/22 still same WHITE with minimal walking. No cp no falls no bleed. Not very active. Recent Leg edema L>R    Smoker  No etoh  Lives with boyfriend and grand kids. Disabled. EKG: SR 91    Past Medical History:   Diagnosis Date    Anxiety     Asthma     CAD (coronary artery disease)     CHF (congestive heart failure) ([facility])     Chronic back pain     COPD (chronic obstructive pulmonary disease) ([facility])     Depression     Hypertension     Obesity     Pelvic pain in female 2/15/2018    Type 2 diabetes mellitus without complication ([facility] [address] 3/6/3851       Past Surgical History:   Procedure Laterality Date    BLADDER SUSPENSION  02/2014    CARPAL TUNNEL RELEASE  89 or 90    right hand     HYSTERECTOMY (CERVIX STATUS UNKNOWN)  2009    full    TN CMBND ANTERPOST COLPORRAPHY W/CYSTO W/NTRCL RPR N/A 2/19/2018    EXCISION OF VAGINAL MESH, CYSTOSCOPY performed by Pushpa Roberts DO at [address]      27 yrs ago       Family History   Problem Relation Age of Onset    Alzheimer's Disease Father     High Blood Pressure Father     Stroke Father         11 strokes in one year    Asthma Sister     Asthma Brother     No Known Problems Daughter     No Known Problems Daughter        Social History     Socioeconomic History    Marital status:     Tobacco Use    Smoking status: Every Day     Packs/day: 1.00     Years: 42.00     Pack years: 42.00     Types: Cigarettes     Start date: 5/6/1979    Smokeless tobacco: Never   Substance and Sexual Activity    Alcohol use: No    Drug use: No    Sexual activity: Yes Partners: Male     Social Determinants of Health     Financial Resource Strain: Low Risk     Difficulty of Paying Living Expenses: Not hard at all   Food Insecurity: No Food Insecurity    Worried About [address] in the Last Year: Never true    Ran Out of Food in the Last Year: Never true       Allergies   Allergen Reactions    Food Hives     Patient still eats strawberries    Other Rash     grass       Current Outpatient Medications   Medication Sig Dispense Refill    azithromycin (ZITHROMAX Z-ROSALINA) 250 MG tablet Take 2 pills together on the first day. Take 1 pill a day for the remaining 4 days. 1 packet 0    ibuprofen (ADVIL;MOTRIN) 800 MG tablet Take 1 tablet by mouth every 8 hours as needed for Pain 60 tablet 0    atorvastatin (LIPITOR) 10 MG tablet Take 1 tablet by mouth nightly 90 tablet 4    potassium chloride (KLOR-CON M) 10 MEQ extended release tablet Take 2 tablets by mouth daily 180 tablet 3    carvedilol (COREG) 3.125 MG tablet TAKE 1 TABLET BY MOUTH TWICE A  tablet 3    furosemide (LASIX) 40 MG tablet Take 1 tablet by mouth daily 90 tablet 3    aspirin EC 81 MG EC tablet Take 1 tablet by mouth daily 90 tablet 1    oxybutynin (DITROPAN-XL) 5 MG extended release tablet TAKE 1 TABLET BY MOUTH EVERY DAY 90 tablet 1    ipratropium-albuterol (DUONEB) 0.5-2.5 (3) MG/3ML SOLN nebulizer solution Inhale 3 mLs into the lungs every 4 hours as needed for Shortness of Breath 360 mL 1    Handicap Placard MISC by Does not apply route Good for 5 years. Expires 3/27/2026. 1 each 0    Misc. Devices (BARIATRIC ROLLATOR) MIS To help with ambulation related to CHF/shortness of breath with activity. 1 each 0    silver sulfADIAZINE (SILVADENE) 1 % cream Apply topically daily.  400 g 2    Hospital Bed MISC by Does not apply route 1 each 0    albuterol sulfate  (90 Base) MCG/ACT inhaler Inhale 2 puffs into the lungs 2 times daily 2 Inhaler 3     No current facility-administered medications for this visit. Review of Systems:   Review of Systems   Constitutional: Negative. Negative for diaphoresis and fatigue. HENT: Negative. Eyes: Negative. Respiratory:  Positive for shortness of breath. Negative for cough, chest tightness, wheezing and stridor. Cardiovascular: Negative. Negative for chest pain, palpitations and leg swelling. Gastrointestinal: Negative. Negative for blood in stool and nausea. Genitourinary: Negative. Musculoskeletal:  Positive for arthralgias and back pain. Skin: Negative. Neurological: Negative. Negative for dizziness, syncope, weakness and light-headedness. Hematological: Negative. Psychiatric/Behavioral: Negative. Physical Examination:    /86 (Site: Right Upper Arm, Position: Sitting, Cuff Size: Medium Adult)   Pulse 91   Resp 17   Ht 5' 5\" (1.651 m)   Wt (!) 335 lb (152 kg)   LMP  (LMP Unknown)   SpO2 92%   BMI 55.75 kg/m²    Physical Exam   Constitutional: No distress. She appears chronically ill. HENT:   Normal cephalic and Atraumatic   Eyes: Pupils are equal, round, and reactive to light. Neck: Thyroid normal. No JVD present. No neck adenopathy. No thyromegaly present. Cardiovascular: Normal rate, regular rhythm, normal heart sounds, intact distal pulses and normal pulses. Pulmonary/Chest: Effort normal and breath sounds normal. She has no wheezes. She has no rales. She exhibits no tenderness. Abdominal: Soft. Bowel sounds are normal. There is no abdominal tenderness. Musculoskeletal:         General: Edema present. No tenderness. Normal range of motion. Cervical back: Normal range of motion and neck supple. Neurological: She is alert and oriented to person, place, and time. Skin: Skin is warm. No cyanosis. Nails show no clubbing.      LABS:  CBC:   Lab Results   Component Value Date/Time    WBC 8.5 02/24/2022 02:50 PM    RBC 4.71 02/24/2022 02:50 PM    HGB 14.1 02/24/2022 02:50 PM    HCT 42.8 02/24/2022 02:50 PM    MCV 90.9 02/24/2022 02:50 PM    MCH 29.8 02/24/2022 02:50 PM    MCHC 32.8 02/24/2022 02:50 PM    RDW 14.3 02/24/2022 02:50 PM     02/24/2022 02:50 PM    MPV 9.0 02/21/2014 11:26 AM     Lipids:  Lab Results   Component Value Date    CHOL 138 06/15/2021    CHOL 200 (H) 03/26/2021    CHOL 181 01/24/2020     Lab Results   Component Value Date    TRIG 114 06/15/2021    TRIG 149 03/26/2021    TRIG 141 01/24/2020     Lab Results   Component Value Date    HDL 50 06/15/2021    HDL 57 03/26/2021    HDL 54 01/24/2020     Lab Results   Component Value Date    LDLCALC 65 06/15/2021    LDLCALC 113 03/26/2021 [address] 01/24/2020     No results found for: LABVLDL, VLDL  No results found for: CHOLHDLRATIO  CMP:    Lab Results   Component Value Date/Time     12/07/2021 02:11 PM    K 4.4 12/07/2021 02:11 PM    K 3.8 06/15/2021 07:24 AM     12/07/2021 02:11 PM    CO2 26 12/07/2021 02:11 PM    BUN 10 12/07/2021 02:11 PM    CREATININE 0.80 12/07/2021 02:11 PM    GFRAA >60.0 12/07/2021 02:11 PM    LABGLOM >60.0 12/07/2021 02:11 PM    GLUCOSE 95 12/07/2021 02:11 PM    GLUCOSE 57 05/26/2020 02:00 PM    PROT 7.7 12/07/2021 02:11 PM    LABALBU 4.4 12/07/2021 02:11 PM    CALCIUM 9.7 12/07/2021 02:11 PM    BILITOT 0.4 12/07/2021 02:11 PM    ALKPHOS 105 12/07/2021 02:11 PM    AST 15 12/07/2021 02:11 PM    ALT 16 12/07/2021 02:11 PM     BMP:    Lab Results   Component Value Date/Time     12/07/2021 02:11 PM    K 4.4 12/07/2021 02:11 PM    K 3.8 06/15/2021 07:24 AM     12/07/2021 02:11 PM    CO2 26 12/07/2021 02:11 PM    BUN 10 12/07/2021 02:11 PM    LABALBU 4.4 12/07/2021 02:11 PM    CREATININE 0.80 12/07/2021 02:11 PM    CALCIUM 9.7 12/07/2021 02:11 PM    GFRAA >60.0 12/07/2021 02:11 PM    LABGLOM >60.0 12/07/2021 02:11 PM    GLUCOSE 95 12/07/2021 02:11 PM    GLUCOSE 57 05/26/2020 02:00 PM     Magnesium:    Lab Results   Component Value Date/Time    MG 2.2 09/04/2013 10:44 PM     TSH:  Lab Results Component Value Date    TSH 2.307 09/04/2013       Patient Active Problem List   Diagnosis    Bilateral leg edema    KARISSA (obstructive sleep apnea)    SOB (shortness of breath)    Tobacco abuse    BMI 50.0-59.9, adult ([facility])    LVH (left ventricular hypertrophy)    Diastolic dysfunction    Essential hypertension    Coronary artery disease involving native coronary artery of native heart    Vitamin D deficiency    Depressed mood    Osteoarthritis of knee    Malaise and fatigue    Hypertensive heart disease with heart failure ([facility])    Generalized anxiety disorder    Gastro-esophageal reflux disease without esophagitis    COPD exacerbation ([facility])    Cervical disc disorder    Intertrigo    Dental caries    Urge incontinence of urine    Dysmetabolic syndrome    Abnormal EKG    Chronic dental pain       There are no discontinued medications. Modified Medications    No medications on file       No orders of the defined types were placed in this encounter. Assessment/Plan:    1. Coronary artery disease involving native coronary artery of native heart, unspecified whether angina present  She has mid dz but many risk factors. She will resume ASA Coreg and Statin     2. Essential hypertension   continue coreg- low salt salt     3. LVH (left ventricular hypertrophy)       4. Diastolic dysfunction     - furosemide (LASIX) 20 MG tablet; Take 1 tablet by mouth daily  Dispense: 60 tablet; Refill: 3    5. Edema of both legs   Resume Lasix 40 qd. - potassium chloride (KLOR-CON M) 10 MEQ extended release tablet; Take 2 tablets by mouth daily  Dispense: 180 tablet; Refill: 3    6. BMI 50.0-59.9, adult ([facility])     - atorvastatin (LIPITOR) 10 MG tablet; Take 1 tablet by mouth nightly  Dispense: 90 tablet; Refill: 4    7. Coronary artery disease involving native coronary artery of native heart     - atorvastatin (LIPITOR) 10 MG tablet; Take 1 tablet by mouth nightly  Dispense: 90 tablet; Refill: 4    8.  Dysmetabolic syndrome - atorvastatin (LIPITOR) 10 MG tablet; Take 1 tablet by mouth nightly  Dispense: 90 tablet; Refill: 4    9. Essential (primary) hypertension     - carvedilol (COREG) 3.125 MG tablet; TAKE 1 TABLET BY MOUTH TWICE A DAY  Dispense: 180 tablet; Refill: 3    10. Atherosclerotic heart disease of native coronary artery without angina pectoris     - carvedilol (COREG) 3.125 MG tablet; TAKE 1 TABLET BY MOUTH TWICE A DAY  Dispense: 180 tablet; Refill: 3    11. Dyslipidemia   statin. Low fat diet. 12. LE edema - B/L Vein Duplex     Counseling:  Heart Healthy Lifestyle, Improve BMI, Low Salt Diet, Take Precautions to Prevent Falls and Walk Daily    No follow-ups on file.       Electronically signed by Kalpana Barnard MD on 8/1/2022 at 2:22 PM

## 2022-08-01 NOTE — TELEPHONE ENCOUNTER
Patient was seen in office and ultrasounds were ordered for bilateral lower extremities to rule out DVT. Dr wanted patient to go today. Insurance requires PA and patient was not agreeable to stay while they were obtained. She was also not agreeable to go to the ER. Importance explained to r/o DVT and patient states she is too busy and has to leave-to just call her with the appt date and time. Dr Gaby Martinez aware.

## 2022-08-02 NOTE — TELEPHONE ENCOUNTER
Spoke with insurance company this morning and no prior auth needed for ultrasound of legs. Spoke with ultrasound dept and patient was scheduled for 930am this morning. Attempted to reach patient and left VM at 830am with no response. Called again at 845am and spoke with boyfriend who stated she was sleeping and to call back later. I did try to explain the importance of the testing and the timeframe of 930am appt and he stated \"I told you she was sleeping-call back later\". Spoke with ultrasound and cancelled the testing.

## 2022-08-26 DIAGNOSIS — E88.81 DYSMETABOLIC SYNDROME: ICD-10-CM

## 2022-08-26 RX ORDER — FUROSEMIDE 40 MG/1
40 TABLET ORAL DAILY
Qty: 90 TABLET | Refills: 1 | Status: SHIPPED | OUTPATIENT
Start: 2022-08-26

## 2022-08-26 RX ORDER — ATORVASTATIN CALCIUM 10 MG/1
10 TABLET, FILM COATED ORAL NIGHTLY
Qty: 90 TABLET | Refills: 1 | Status: SHIPPED | OUTPATIENT
Start: 2022-08-26

## 2022-08-26 NOTE — TELEPHONE ENCOUNTER
Please approve or deny this refill request. The order is pended. Thank you.     LOV 8/1/2022    Next Visit Date:  Future Appointments   Date Time Provider Pino Mcintosh   12/1/2022  1:30 PM Judith Del Angel MD [facility]

## 2022-08-29 DIAGNOSIS — I10 ESSENTIAL (PRIMARY) HYPERTENSION: ICD-10-CM

## 2022-08-29 DIAGNOSIS — J44.9 CHRONIC OBSTRUCTIVE PULMONARY DISEASE, UNSPECIFIED COPD TYPE (HCC): ICD-10-CM

## 2022-08-29 DIAGNOSIS — R60.0 EDEMA OF BOTH LEGS: ICD-10-CM

## 2022-08-29 DIAGNOSIS — N39.41 URGE INCONTINENCE OF URINE: ICD-10-CM

## 2022-08-29 DIAGNOSIS — J42 CHRONIC BRONCHITIS, UNSPECIFIED CHRONIC BRONCHITIS TYPE (HCC): ICD-10-CM

## 2022-08-29 RX ORDER — ALBUTEROL SULFATE 90 UG/1
2 AEROSOL, METERED RESPIRATORY (INHALATION) 2 TIMES DAILY
Qty: 2 EACH | Refills: 3 | Status: SHIPPED | OUTPATIENT
Start: 2022-08-29 | End: 2022-09-07 | Stop reason: SDUPTHER

## 2022-08-29 RX ORDER — CARVEDILOL 3.12 MG/1
TABLET ORAL
Qty: 180 TABLET | Refills: 3 | Status: SHIPPED | OUTPATIENT
Start: 2022-08-29 | End: 2022-09-07 | Stop reason: SDUPTHER

## 2022-08-29 RX ORDER — IBUPROFEN 800 MG/1
800 TABLET ORAL EVERY 8 HOURS PRN
Qty: 60 TABLET | Refills: 0 | Status: SHIPPED | OUTPATIENT
Start: 2022-08-29 | End: 2022-09-07 | Stop reason: SDUPTHER

## 2022-08-29 RX ORDER — OXYBUTYNIN CHLORIDE 5 MG/1
TABLET, EXTENDED RELEASE ORAL
Qty: 90 TABLET | Refills: 1 | Status: SHIPPED | OUTPATIENT
Start: 2022-08-29 | End: 2022-09-07 | Stop reason: SDUPTHER

## 2022-08-29 RX ORDER — IPRATROPIUM BROMIDE AND ALBUTEROL SULFATE 2.5; .5 MG/3ML; MG/3ML
1 SOLUTION RESPIRATORY (INHALATION) EVERY 4 HOURS PRN
Qty: 360 ML | Refills: 1 | Status: SHIPPED | OUTPATIENT
Start: 2022-08-29 | End: 2022-09-07 | Stop reason: SDUPTHER

## 2022-08-29 RX ORDER — POTASSIUM CHLORIDE 750 MG/1
20 TABLET, EXTENDED RELEASE ORAL DAILY
Qty: 180 TABLET | Refills: 3 | Status: SHIPPED | OUTPATIENT
Start: 2022-08-29 | End: 2022-09-07 | Stop reason: SDUPTHER

## 2022-08-29 NOTE — TELEPHONE ENCOUNTER
Last refill albuterol inhaler 1/31/20  Last refill carvedilol 8/1/22  Last refill ibuprofen 3/17/22  Last refill oxybutynin 6/22/21  Last refill potassium 8/1/22  Last OV 6/13/22

## 2022-09-06 DIAGNOSIS — J42 CHRONIC BRONCHITIS, UNSPECIFIED CHRONIC BRONCHITIS TYPE (HCC): ICD-10-CM

## 2022-09-06 DIAGNOSIS — N39.41 URGE INCONTINENCE OF URINE: ICD-10-CM

## 2022-09-06 DIAGNOSIS — R60.0 EDEMA OF BOTH LEGS: ICD-10-CM

## 2022-09-06 DIAGNOSIS — J44.9 CHRONIC OBSTRUCTIVE PULMONARY DISEASE, UNSPECIFIED COPD TYPE (HCC): ICD-10-CM

## 2022-09-06 DIAGNOSIS — I10 ESSENTIAL (PRIMARY) HYPERTENSION: ICD-10-CM

## 2022-09-07 RX ORDER — ALBUTEROL SULFATE 90 UG/1
2 AEROSOL, METERED RESPIRATORY (INHALATION) 2 TIMES DAILY
Qty: 2 EACH | Refills: 3 | Status: SHIPPED | OUTPATIENT
Start: 2022-09-07

## 2022-09-07 RX ORDER — CARVEDILOL 3.12 MG/1
TABLET ORAL
Qty: 180 TABLET | Refills: 3 | Status: SHIPPED | OUTPATIENT
Start: 2022-09-07

## 2022-09-07 RX ORDER — OXYBUTYNIN CHLORIDE 5 MG/1
TABLET, EXTENDED RELEASE ORAL
Qty: 90 TABLET | Refills: 1 | Status: SHIPPED | OUTPATIENT
Start: 2022-09-07

## 2022-09-07 RX ORDER — IBUPROFEN 800 MG/1
800 TABLET ORAL EVERY 8 HOURS PRN
Qty: 60 TABLET | Refills: 0 | Status: SHIPPED | OUTPATIENT
Start: 2022-09-07

## 2022-09-07 RX ORDER — IPRATROPIUM BROMIDE AND ALBUTEROL SULFATE 2.5; .5 MG/3ML; MG/3ML
1 SOLUTION RESPIRATORY (INHALATION) EVERY 4 HOURS PRN
Qty: 360 ML | Refills: 1 | Status: SHIPPED | OUTPATIENT
Start: 2022-09-07 | End: 2022-10-26

## 2022-09-07 RX ORDER — POTASSIUM CHLORIDE 750 MG/1
20 TABLET, EXTENDED RELEASE ORAL DAILY
Qty: 180 TABLET | Refills: 3 | Status: SHIPPED | OUTPATIENT
Start: 2022-09-07

## 2022-09-13 RX ORDER — IBUPROFEN 800 MG/1
TABLET ORAL
Qty: 60 TABLET | Refills: 0 | OUTPATIENT
Start: 2022-09-13

## 2022-09-24 DIAGNOSIS — J44.9 CHRONIC OBSTRUCTIVE PULMONARY DISEASE, UNSPECIFIED COPD TYPE (HCC): ICD-10-CM

## 2022-09-24 RX ORDER — IPRATROPIUM BROMIDE AND ALBUTEROL SULFATE 2.5; .5 MG/3ML; MG/3ML
SOLUTION RESPIRATORY (INHALATION)
Qty: 360 ML | Refills: 1 | OUTPATIENT
Start: 2022-09-24

## 2022-10-10 DIAGNOSIS — L03.115 CELLULITIS OF BOTH LOWER EXTREMITIES: ICD-10-CM

## 2022-10-10 DIAGNOSIS — L03.116 CELLULITIS OF BOTH LOWER EXTREMITIES: ICD-10-CM

## 2022-10-14 ENCOUNTER — TELEPHONE (OUTPATIENT)
Dept: FAMILY MEDICINE CLINIC | Age: 57
End: 2022-10-14

## 2022-10-14 NOTE — TELEPHONE ENCOUNTER
Pt declined having mammogram at this time. Pt was giving the scheduling department number to call when able to go in.

## 2022-10-24 DIAGNOSIS — J44.9 CHRONIC OBSTRUCTIVE PULMONARY DISEASE, UNSPECIFIED COPD TYPE (HCC): ICD-10-CM

## 2022-10-25 NOTE — TELEPHONE ENCOUNTER
Rx request   Requested Prescriptions     Pending Prescriptions Disp Refills    ipratropium-albuterol (DUONEB) 0.5-2.5 (3) MG/3ML SOLN nebulizer solution [Pharmacy Med Name: ipratropium 0.5 mg-albuterol 3 mg (2.5 mg base)/3 mL nebulization soln] 360 mL 5     Sig: INHALE THREE ML EVERY 4 HOURS AS NEEDED FOR SHORTNESS OF BREATH (BULK)     LOV 6/13/2022  Last refill 9/7/22  Next Visit Date:  Future Appointments   Date Time Provider Pino Mcintosh   11/9/2022  1:00 PM CYN Chin   12/1/2022  1:30 PM Yann Buchanan MD [facility]

## 2022-10-26 RX ORDER — IPRATROPIUM BROMIDE AND ALBUTEROL SULFATE 2.5; .5 MG/3ML; MG/3ML
SOLUTION RESPIRATORY (INHALATION)
Qty: 360 ML | Refills: 5 | Status: SHIPPED | OUTPATIENT
Start: 2022-10-26

## 2022-11-09 ENCOUNTER — TELEMEDICINE (OUTPATIENT)
Dept: FAMILY MEDICINE CLINIC | Age: 57
End: 2022-11-09
Payer: MEDICARE

## 2022-11-09 DIAGNOSIS — Z00.00 MEDICARE ANNUAL WELLNESS VISIT, SUBSEQUENT: Primary | ICD-10-CM

## 2022-11-09 DIAGNOSIS — E88.81 INSULIN RESISTANCE: ICD-10-CM

## 2022-11-09 DIAGNOSIS — G47.33 OSA (OBSTRUCTIVE SLEEP APNEA): ICD-10-CM

## 2022-11-09 PROCEDURE — G0439 PPPS, SUBSEQ VISIT: HCPCS | Performed by: PHYSICIAN ASSISTANT

## 2022-11-09 PROCEDURE — 3017F COLORECTAL CA SCREEN DOC REV: CPT | Performed by: PHYSICIAN ASSISTANT

## 2022-11-09 PROCEDURE — G8484 FLU IMMUNIZE NO ADMIN: HCPCS | Performed by: PHYSICIAN ASSISTANT

## 2022-11-09 RX ORDER — DULAGLUTIDE 0.75 MG/.5ML
0.75 INJECTION, SOLUTION SUBCUTANEOUS WEEKLY
Qty: 4 ADJUSTABLE DOSE PRE-FILLED PEN SYRINGE | Refills: 2 | Status: SHIPPED | OUTPATIENT
Start: 2022-11-09

## 2022-11-09 ASSESSMENT — PATIENT HEALTH QUESTIONNAIRE - PHQ9
SUM OF ALL RESPONSES TO PHQ9 QUESTIONS 1 & 2: 0
SUM OF ALL RESPONSES TO PHQ QUESTIONS 1-9: 0
SUM OF ALL RESPONSES TO PHQ QUESTIONS 1-9: 0
1. LITTLE INTEREST OR PLEASURE IN DOING THINGS: 0
SUM OF ALL RESPONSES TO PHQ QUESTIONS 1-9: 0
2. FEELING DOWN, DEPRESSED OR HOPELESS: 0
SUM OF ALL RESPONSES TO PHQ9 QUESTIONS 1 & 2: 0
SUM OF ALL RESPONSES TO PHQ QUESTIONS 1-9: 0
1. LITTLE INTEREST OR PLEASURE IN DOING THINGS: 0
SUM OF ALL RESPONSES TO PHQ QUESTIONS 1-9: 0
2. FEELING DOWN, DEPRESSED OR HOPELESS: 0

## 2022-11-09 ASSESSMENT — LIFESTYLE VARIABLES
HOW MANY STANDARD DRINKS CONTAINING ALCOHOL DO YOU HAVE ON A TYPICAL DAY: PATIENT DOES NOT DRINK
HOW OFTEN DO YOU HAVE A DRINK CONTAINING ALCOHOL: NEVER

## 2022-11-09 NOTE — PROGRESS NOTES
Medicare Annual Wellness Visit    Esperanza Benavides is here for No chief complaint on file. Assessment & Plan   Medicare annual wellness visit, subsequent    Recommendations for Preventive Services Due: see orders and patient instructions/AVS.  Recommended screening schedule for the next 5-10 years is provided to the patient in written form: see Patient Instructions/AVS.     Return for Medicare Annual Wellness Visit in 1 year. Subjective   The following acute and/or chronic problems were also addressed today:  Insulin resistance--wants to start Trulicity injections for sugar/insulin. Unable to tolerate metformin due to GI side effects. Due for labs. KARISSA-needs new CPAP MACHINE. Has not been using for 2 months. Feels significantly fatigue/tired. Patient's complete Health Risk Assessment and screening values have been reviewed and are found in Flowsheets. The following problems were reviewed today and where indicated follow up appointments were made and/or referrals ordered.     Positive Risk Factor Screenings with Interventions:       Tobacco Use:  Tobacco Use: High Risk    Smoking Tobacco Use: Every Day    Smokeless Tobacco Use: Never    Passive Exposure: Not on file     E-cigarette/Vaping       Questions Responses    E-cigarette/Vaping Use     Start Date     Passive Exposure     Quit Date     Counseling Given     Comments           Substance Use - Tobacco Interventions:  patient is not ready to work toward tobacco cessation at this time         General Health and ACP:  General  In general, how would you say your health is?: Fair  In the past 7 days, have you experienced any of the following: New or Increased Pain, New or Increased Fatigue, Loneliness, Social Isolation, Stress or Anger?: (!) Yes  Select all that apply: (!) New or Increased Fatigue  Do you get the social and emotional support that you need?: Yes  Do you have a Living Will?: (!) No    Advance Directives       Power of [employer] Will ACP-Advance Directive ACP-Power of     Not on File Not on File Not on File Not on File          General Health Risk Interventions:  Fatigue: needs new CPAP    Health Habits/Nutrition:  Physical Activity: Inactive    Days of Exercise per Week: 0 days    Minutes of Exercise per Session: 0 min     Have you lost any weight without trying in the past 3 months?: No     Have you seen the dentist within the past year?: N/A - wear dentures  Health Habits/Nutrition Interventions:  Inadequate physical activity:  patient is not ready to increase his/her physical activity level at this time     Safety:  Do you have working smoke detectors?: Yes  Do you have any tripping hazards - loose or unsecured carpets or rugs?: (!) Yes  Do you have any tripping hazards - clutter in doorways, halls, or stairs?: No  Do you have either shower bars, grab bars, non-slip mats or non-slip surfaces in your shower or bathtub?: Yes  Do all of your stairways have a railing or banister?: Not Applicable  Do you always fasten your seatbelt when you are in a car?: Yes  Safety Interventions:  Patient declines any further evaluation/treatment for this issue           Objective      Patient-Reported Vitals  No data recorded          Allergies   Allergen Reactions    Food Hives     Patient still eats strawberries    Other Rash     grass     Prior to Visit Medications    Medication Sig Taking? Authorizing Provider   ipratropium-albuterol (DUONEB) 0.5-2.5 (3) MG/3ML SOLN nebulizer solution INHALE THREE ML EVERY 4 HOURS AS NEEDED FOR SHORTNESS OF BREATH (BULK)  Zoie Ervin PA-C   silver sulfADIAZINE (SILVADENE) 1 % cream Apply topically daily.   Dima Amato MD   albuterol sulfate HFA (PROVENTIL;VENTOLIN;PROAIR) 108 (90 Base) MCG/ACT inhaler Inhale 2 puffs into the lungs 2 times daily  Zoie Ervin PA-C   carvedilol (COREG) 3.125 MG tablet TAKE 1 TABLET BY MOUTH TWICE A DAY  Zoie Ervin PA-C   ibuprofen (ADVIL;MOTRIN) 800 MG tablet Take 1 tablet by mouth every 8 hours as needed for Pain  Zoie Ervin PA-C   oxybutynin (DITROPAN-XL) 5 MG extended release tablet TAKE 1 TABLET BY MOUTH EVERY DAY  Zoie Ervin PA-C   potassium chloride (KLOR-CON M) 10 MEQ extended release tablet Take 2 tablets by mouth daily  Zoie Ervin PA-C   furosemide (LASIX) 40 MG tablet Take 1 tablet by mouth daily  Jd Silva MD   atorvastatin (LIPITOR) 10 MG tablet Take 1 tablet by mouth nightly  Jd Silva MD   aspirin EC 81 MG EC tablet Take 1 tablet by mouth in the morning. Jd Silva MD   azithromycin (ZITHROMAX Z-ROSALINA) 250 MG tablet Take 2 pills together on the first day. Take 1 pill a day for the remaining 4 days. Zoie Ervin PA-C   Handicap Placard MISC by Does not apply route Good for 5 years. Expires 3/27/2026. Zoie Ervin PA-C   Misc. Devices (BARIATRIC ROLLATOR) MISC To help with ambulation related to CHF/shortness of breath with activity. [facility] by Does not apply route  Zoie Ervin PA-C       CareTeam (Including outside providers/suppliers regularly involved in providing care):   Patient Care Team:  Carmen Ponce PA-C as PCP - General (Family Medicine)  Carmen Ponce PA-C as PCP - REHABILITATION [facility]  Jd Silva MD as Cardiologist (Cardiology)     Reviewed and updated this visit:  Allergies  Meds  Problems           Karl Martinez, was evaluated through a synchronous (real-time) audio-video encounter. The patient (or guardian if applicable) is aware that this is a billable service, which includes applicable co-pays. This Virtual Visit was conducted with patient's (and/or legal guardian's) consent. The visit was conducted pursuant to the emergency declaration under the [facility], [address] [place] [address] authority and the [employer] Act. Patient identification was verified, and a caregiver was present when appropriate.    The patient was located at Home: [facility]. Provider was located at [facility] ([address] [place], [place] [address]t): [address]  [address] [facility],  [id number] [place] [address].

## 2022-11-09 NOTE — PATIENT INSTRUCTIONS
Personalized Preventive Plan for Olga Richards - 11/9/2022  Medicare offers a range of preventive health benefits. Some of the tests and screenings are paid in full while other may be subject to a deductible, co-insurance, and/or copay. Some of these benefits include a comprehensive review of your medical history including lifestyle, illnesses that may run in your family, and various assessments and screenings as appropriate. After reviewing your medical record and screening and assessments performed today your provider may have ordered immunizations, labs, imaging, and/or referrals for you. A list of these orders (if applicable) as well as your Preventive Care list are included within your After Visit Summary for your review. Other Preventive Recommendations:    A preventive eye exam performed by an eye specialist is recommended every 1-2 years to screen for glaucoma; cataracts, macular degeneration, and other eye disorders. A preventive dental visit is recommended every 6 months. Try to get at least 150 minutes of exercise per week or 10,000 steps per day on a pedometer . Order or download the FREE \"Exercise & Physical Activity: Your Everyday Guide\" from [employer]. Call 9-309.416.7997 or search [employer] online. You need 0409-0488 mg of calcium and 0565-3201 IU of vitamin D per day. It is possible to meet your calcium requirement with diet alone, but a vitamin D supplement is usually necessary to meet this goal.  When exposed to the sun, use a sunscreen that protects against both UVA and UVB radiation with an SPF of 30 or greater. Reapply every 2 to 3 hours or after sweating, drying off with a towel, or swimming. Always wear a seat belt when traveling in a car. Always wear a helmet when riding a bicycle or motorcycle.

## 2022-12-19 DIAGNOSIS — L03.115 CELLULITIS OF BOTH LOWER EXTREMITIES: ICD-10-CM

## 2022-12-19 DIAGNOSIS — L03.116 CELLULITIS OF BOTH LOWER EXTREMITIES: ICD-10-CM

## 2022-12-21 ENCOUNTER — TELEPHONE (OUTPATIENT)
Dept: FAMILY MEDICINE CLINIC | Age: 57
End: 2022-12-21

## 2022-12-21 NOTE — TELEPHONE ENCOUNTER
Pt declined having mammogram done at this time. Pt stated that son just passed and will call to schedule once arrangements are over.

## 2022-12-23 ENCOUNTER — TELEPHONE (OUTPATIENT)
Dept: FAMILY MEDICINE CLINIC | Age: 57
End: 2022-12-23

## 2022-12-23 DIAGNOSIS — B37.0 ORAL CANDIDA: ICD-10-CM

## 2022-12-23 DIAGNOSIS — J11.1 INFLUENZA: Primary | ICD-10-CM

## 2022-12-23 RX ORDER — OSELTAMIVIR PHOSPHATE 75 MG/1
75 CAPSULE ORAL 2 TIMES DAILY
Qty: 10 CAPSULE | Refills: 0 | Status: SHIPPED | OUTPATIENT
Start: 2022-12-23 | End: 2022-12-28

## 2022-12-23 RX ORDER — FLUCONAZOLE 150 MG/1
TABLET ORAL
Qty: 2 TABLET | Refills: 0 | Status: SHIPPED | OUTPATIENT
Start: 2022-12-23

## 2022-12-23 NOTE — TELEPHONE ENCOUNTER
Patient states that she has thrush on her tough and the flu. Patient want you to send a prescription in for her. Thrush symptoms started about a month ago and flu symptoms since 12/17/2022. Patient also states that when she touches her face she it feels like it is does when you foot falls asleep.     LOV  11/9/2022

## 2023-01-19 NOTE — PROGRESS NOTES
Addended by: ANNIKA ELLIS on: 1/19/2023 04:53 PM     Modules accepted: Orders     ipratropium-albuterol (DUONEB) 0.5-2.5 (3) MG/3ML SOLN nebulizer solution Inhale 3 mLs into the lungs every 4 hours as needed for Shortness of Breath 360 mL 1    Handicap Placard MISC by Does not apply route Good for 5 years. Expires 3/27/2026. 1 each 0    Misc. Devices (BARIATRIC ROLLATOR) MISC To help with ambulation related to CHF/shortness of breath with activity. 1 each 0    carvedilol (COREG) 3.125 MG tablet Take 1 tablet by mouth 2 times daily (with meals) HOLD for SBP<100 or HR<60 60 tablet 3    losartan (COZAAR) 25 MG tablet Take 1 tablet by mouth daily 30 tablet 3    furosemide (LASIX) 40 MG tablet Take 1 tablet by mouth 2 times daily 60 tablet 5    silver sulfADIAZINE (SILVADENE) 1 % cream Apply topically daily. 400 g 2    atorvastatin (LIPITOR) 10 MG tablet Take 1 tablet by mouth nightly 90 tablet 4    vitamin D (ERGOCALCIFEROL) 1.25 MG (97640 UT) CAPS capsule Take 1 capsule by mouth Twice a Week 24 capsule 1    oxybutynin (DITROPAN-XL) 5 MG extended release tablet Take 1 tablet by mouth daily 30 tablet 3    buPROPion (WELLBUTRIN XL) 150 MG extended release tablet TAKE 1 TABLET BY MOUTH EVERY DAY IN THE MORNING 90 tablet 1   [address] by Does not apply route 1 each 0    albuterol sulfate  (90 Base) MCG/ACT inhaler Inhale 2 puffs into the lungs 2 times daily 2 Inhaler 3    potassium chloride (KLOR-CON M) 10 MEQ extended release tablet Take 2 tablets by mouth daily (Patient not taking: Reported on 6/21/2021) 60 tablet 2    ergotamine-caffeine (CAFERGOT) 1-100 MG per tablet Two tablets at onset of attack; then 1 tablet every 30 minutes as needed; maximum: 6 tablets per attack; do not exceed 10 tablets/week. (Patient not taking: Reported on 6/16/2021) 30 tablet 0     No current facility-administered medications for this visit.        Objective    Vitals:    06/21/21 1344 06/21/21 1400   BP: (!) 138/90 128/82   Pulse: 100    Temp: 98.1 °F (36.7 °C)    TempSrc: Oral    Weight: (!) 337 lb 6.4 oz (153 kg)    Height: 5' 5\" (1.651 m)      Physical Exam  Constitutional:       General: She is not in acute distress. Appearance: She is obese. She is not ill-appearing. HENT:      Head: Normocephalic and atraumatic. Right Ear: External ear normal.      Left Ear: External ear normal.      Nose: Nose normal.      Mouth/Throat:      Mouth: Mucous membranes are moist.      Pharynx: Oropharynx is clear. Eyes:      Conjunctiva/sclera: Conjunctivae normal.      Pupils: Pupils are equal, round, and reactive to light. Neck:      Thyroid: No thyromegaly. Cardiovascular:      Rate and Rhythm: Normal rate and regular rhythm. Heart sounds: Normal heart sounds. No murmur heard. Pulmonary:      Effort: Pulmonary effort is normal.      Breath sounds: Normal breath sounds. Abdominal:      General: Bowel sounds are normal.      Palpations: Abdomen is soft. There is no mass. Tenderness: There is no abdominal tenderness. There is no guarding. Musculoskeletal:         General: Normal range of motion. Cervical back: Normal range of motion and neck supple. Right lower leg: Edema present. Lymphadenopathy:      Cervical: No cervical adenopathy. Skin:     General: Skin is warm and dry. Coloration: Skin is not jaundiced. Neurological:      General: No focal deficit present. Mental Status: She is alert and oriented to person, place, and time. Cranial Nerves: No cranial nerve deficit. Motor: No weakness. Coordination: Coordination normal.      Gait: Gait normal.   Psychiatric:         Mood and Affect: Mood normal.         Behavior: Behavior normal.         Thought Content: Thought content normal.         Judgment: Judgment normal.              Assessment & Plan    Diagnosis Orders   1. Acute respiratory failure with hypoxia and hypercapnia (HCC)  CA DISCHARGE MEDS RECONCILED W/ CURRENT OUTPATIENT MED LIST   2.  Chronic obstructive pulmonary disease, unspecified COPD type ([facility] [address]  ipratropium-albuterol (DUONEB) 0.5-2.5 (3) MG/3ML SOLN nebulizer solution    CO DISCHARGE MEDS RECONCILED W/ CURRENT OUTPATIENT MED LIST   3. Obstructive sleep apnea syndrome  Sleep Study with PAP Titration    CO DISCHARGE MEDS RECONCILED W/ CURRENT OUTPATIENT MED LIST   4. Nicotine use disorder     5. Hospital discharge follow-up  CO DISCHARGE MEDS RECONCILED W/ CURRENT OUTPATIENT MED LIST         Orders Placed This Encounter   Procedures    Sleep Study with PAP Titration     Standing Status:   Future     Standing Expiration Date:   12/21/2022     Scheduling Instructions:      bipap     Order Specific Question:   Sleep Study Titration Type     Answer:   Split Night Study (Baseline followed by PAP Titration)     Order Specific Question:   Location For Sleep Study     Answer:   [place]     Order Specific Question:   Select Sleep Lab Location     Answer:   [facility]    CO DISCHARGE MEDS RECONCILED W/ CURRENT OUTPATIENT MED LIST     Orders Placed This Encounter   Medications    ipratropium-albuterol (DUONEB) 0.5-2.5 (3) MG/3ML SOLN nebulizer solution     Sig: Inhale 3 mLs into the lungs every 4 hours as needed for Shortness of Breath     Dispense:  360 mL     Refill:  1     Medications Discontinued During This Encounter   Medication Reason    ipratropium-albuterol (DUONEB) 0.5-2.5 (3) MG/3ML SOLN nebulizer solution REORDER     Return in 2 weeks (on 7/5/2021).     Zehra Toro PA-C

## 2023-01-29 DIAGNOSIS — E88.81 INSULIN RESISTANCE: ICD-10-CM

## 2023-01-30 DIAGNOSIS — E88.81 DYSMETABOLIC SYNDROME: ICD-10-CM

## 2023-01-30 DIAGNOSIS — N39.41 URGE INCONTINENCE OF URINE: ICD-10-CM

## 2023-01-30 RX ORDER — ATORVASTATIN CALCIUM 10 MG/1
TABLET, FILM COATED ORAL
Qty: 90 TABLET | Refills: 3 | Status: SHIPPED | OUTPATIENT
Start: 2023-01-30

## 2023-01-30 RX ORDER — FUROSEMIDE 40 MG/1
TABLET ORAL
Qty: 90 TABLET | Refills: 3 | Status: SHIPPED | OUTPATIENT
Start: 2023-01-30

## 2023-01-30 NOTE — TELEPHONE ENCOUNTER
Comments:     Last Office Visit (last PCP visit):   11/9/2022    Next Visit Date:  No future appointments. **If hasn't been seen in over a year OR hasn't followed up according to last diabetes/ADHD visit, make appointment for patient before sending refill to provider.     Rx requested:  Requested Prescriptions     Pending Prescriptions Disp Refills    oxybutynin (DITROPAN-XL) 5 MG extended release tablet [Pharmacy Med Name: oxybutynin chloride ER 5 mg tablet,extended release 24 hr] 90 tablet 1     Sig: TAKE ONE TABLET BY MOUTH DAILY AT 9AM

## 2023-01-30 NOTE — TELEPHONE ENCOUNTER
Comments:     Last Office Visit (last PCP visit):   11/9/2022    Next Visit Date:  No future appointments. **If hasn't been seen in over a year OR hasn't followed up according to last diabetes/ADHD visit, make appointment for patient before sending refill to provider.     Rx requested:  Requested Prescriptions     Pending Prescriptions Disp Refills    TRULICITY 7.22 SX/7.4JK SOPN [Pharmacy Med Name: TRULICITY 0.28 TR/5.7 ML PEN]  2     Sig: INJECT 0.75 MG INTO THE SKIN ONCE A WEEK

## 2023-01-30 NOTE — TELEPHONE ENCOUNTER
Requesting medication refill. Please approve or deny this request.    Rx requested:  Requested Prescriptions     Pending Prescriptions Disp Refills    furosemide (LASIX) 40 MG tablet [Pharmacy Med Name: furosemide 40 mg tablet] 90 tablet 3     Sig: TAKE ONE TABLET BY MOUTH DAILY AT 9AM    atorvastatin (LIPITOR) 10 MG tablet [Pharmacy Med Name: atorvastatin 10 mg tablet] 90 tablet 3     Sig: TAKE ONE TABLET BY MOUTH DAILY AT 9PM (NIGHTLY)         Last Office Visit:   8/1/2022      Next Visit Date:  No future appointments. Last refill 8/26/22. Please approve or deny.

## 2023-01-31 RX ORDER — DULAGLUTIDE 0.75 MG/.5ML
0.75 INJECTION, SOLUTION SUBCUTANEOUS WEEKLY
Qty: 4 ADJUSTABLE DOSE PRE-FILLED PEN SYRINGE | Refills: 5 | Status: SHIPPED | OUTPATIENT
Start: 2023-01-31 | End: 2023-03-20 | Stop reason: DRUGHIGH

## 2023-01-31 RX ORDER — OXYBUTYNIN CHLORIDE 5 MG/1
TABLET, EXTENDED RELEASE ORAL
Qty: 90 TABLET | Refills: 1 | Status: SHIPPED | OUTPATIENT
Start: 2023-01-31

## 2023-03-06 DIAGNOSIS — L03.115 CELLULITIS OF BOTH LOWER EXTREMITIES: ICD-10-CM

## 2023-03-06 DIAGNOSIS — L03.116 CELLULITIS OF BOTH LOWER EXTREMITIES: ICD-10-CM

## 2023-03-06 NOTE — TELEPHONE ENCOUNTER
Please approve or deny this refill request. The order is pended. Thank you.     LOV 8/1/2022    Next Visit Date:  Future Appointments   Date Time Provider Pino Mcintosh   3/20/2023  2:00 PM Alexis Mcgregor PA-C [address]

## 2023-03-20 ENCOUNTER — OFFICE VISIT (OUTPATIENT)
Dept: FAMILY MEDICINE CLINIC | Age: 58
End: 2023-03-20
Payer: MEDICARE

## 2023-03-20 VITALS
HEIGHT: 65 IN | WEIGHT: 293 LBS | HEART RATE: 93 BPM | OXYGEN SATURATION: 89 % | SYSTOLIC BLOOD PRESSURE: 118 MMHG | BODY MASS INDEX: 48.82 KG/M2 | DIASTOLIC BLOOD PRESSURE: 88 MMHG | RESPIRATION RATE: 18 BRPM

## 2023-03-20 DIAGNOSIS — B34.9 VIRAL ILLNESS: ICD-10-CM

## 2023-03-20 DIAGNOSIS — I25.10 CORONARY ARTERY DISEASE INVOLVING NATIVE CORONARY ARTERY OF NATIVE HEART, UNSPECIFIED WHETHER ANGINA PRESENT: ICD-10-CM

## 2023-03-20 DIAGNOSIS — G47.33 OSA (OBSTRUCTIVE SLEEP APNEA): ICD-10-CM

## 2023-03-20 DIAGNOSIS — I51.89 DIASTOLIC DYSFUNCTION: ICD-10-CM

## 2023-03-20 DIAGNOSIS — Z11.4 ENCOUNTER FOR SCREENING FOR HIV: ICD-10-CM

## 2023-03-20 DIAGNOSIS — I10 ESSENTIAL HYPERTENSION: ICD-10-CM

## 2023-03-20 DIAGNOSIS — R79.9 ABNORMAL FINDING OF BLOOD CHEMISTRY, UNSPECIFIED: ICD-10-CM

## 2023-03-20 DIAGNOSIS — R63.5 WEIGHT GAIN: ICD-10-CM

## 2023-03-20 DIAGNOSIS — J01.90 ACUTE BACTERIAL SINUSITIS: ICD-10-CM

## 2023-03-20 DIAGNOSIS — J41.0 SIMPLE CHRONIC BRONCHITIS (HCC): ICD-10-CM

## 2023-03-20 DIAGNOSIS — E55.9 VITAMIN D DEFICIENCY: ICD-10-CM

## 2023-03-20 DIAGNOSIS — I11.0 HYPERTENSIVE HEART DISEASE WITH HEART FAILURE (HCC): ICD-10-CM

## 2023-03-20 DIAGNOSIS — R73.09 ABNORMAL BLOOD SUGAR: ICD-10-CM

## 2023-03-20 DIAGNOSIS — Z11.59 NEED FOR HEPATITIS C SCREENING TEST: ICD-10-CM

## 2023-03-20 DIAGNOSIS — Z12.31 BREAST CANCER SCREENING BY MAMMOGRAM: ICD-10-CM

## 2023-03-20 DIAGNOSIS — B96.89 ACUTE BACTERIAL SINUSITIS: ICD-10-CM

## 2023-03-20 PROBLEM — J44.1 COPD EXACERBATION (HCC): Status: RESOLVED | Noted: 2020-02-05 | Resolved: 2023-03-20

## 2023-03-20 PROBLEM — K02.9 DENTAL CARIES: Status: RESOLVED | Noted: 2021-02-23 | Resolved: 2023-03-20

## 2023-03-20 LAB
ALBUMIN SERPL-MCNC: 3.9 G/DL (ref 3.5–4.6)
ALP SERPL-CCNC: 97 U/L (ref 40–130)
ALT SERPL-CCNC: 10 U/L (ref 0–33)
ANION GAP SERPL CALCULATED.3IONS-SCNC: 10 MEQ/L (ref 9–15)
AST SERPL-CCNC: 10 U/L (ref 0–35)
BILIRUB SERPL-MCNC: 0.3 MG/DL (ref 0.2–0.7)
BUN SERPL-MCNC: 10 MG/DL (ref 6–20)
CALCIUM SERPL-MCNC: 9.6 MG/DL (ref 8.5–9.9)
CHLORIDE SERPL-SCNC: 101 MEQ/L (ref 95–107)
CHOLEST SERPL-MCNC: 234 MG/DL (ref 0–199)
CO2 SERPL-SCNC: 35 MEQ/L (ref 20–31)
CREAT SERPL-MCNC: 0.77 MG/DL (ref 0.5–0.9)
ERYTHROCYTE [DISTWIDTH] IN BLOOD BY AUTOMATED COUNT: 15.1 % (ref 11.5–14.5)
GLOBULIN SER CALC-MCNC: 3.1 G/DL (ref 2.3–3.5)
GLUCOSE SERPL-MCNC: 82 MG/DL (ref 70–99)
HBA1C MFR BLD: 4.9 % (ref 4.8–5.9)
HCT VFR BLD AUTO: 44.7 % (ref 37–47)
HDLC SERPL-MCNC: 60 MG/DL (ref 40–59)
HGB BLD-MCNC: 14.5 G/DL (ref 12–16)
LDLC SERPL CALC-MCNC: 151 MG/DL (ref 0–129)
MCH RBC QN AUTO: 29.7 PG (ref 27–31.3)
MCHC RBC AUTO-ENTMCNC: 32.4 % (ref 33–37)
MCV RBC AUTO: 91.7 FL (ref 79.4–94.8)
PLATELET # BLD AUTO: 240 K/UL (ref 130–400)
POTASSIUM SERPL-SCNC: 4.9 MEQ/L (ref 3.4–4.9)
PROT SERPL-MCNC: 7 G/DL (ref 6.3–8)
RBC # BLD AUTO: 4.87 M/UL (ref 4.2–5.4)
SODIUM SERPL-SCNC: 146 MEQ/L (ref 135–144)
T4 FREE SERPL-MCNC: 0.98 NG/DL (ref 0.84–1.68)
TRIGL SERPL-MCNC: 117 MG/DL (ref 0–150)
TSH SERPL-MCNC: 1.78 UIU/ML (ref 0.44–3.86)
WBC # BLD AUTO: 9.4 K/UL (ref 4.8–10.8)

## 2023-03-20 PROCEDURE — 3079F DIAST BP 80-89 MM HG: CPT | Performed by: PHYSICIAN ASSISTANT

## 2023-03-20 PROCEDURE — 99214 OFFICE O/P EST MOD 30 MIN: CPT | Performed by: PHYSICIAN ASSISTANT

## 2023-03-20 PROCEDURE — 3074F SYST BP LT 130 MM HG: CPT | Performed by: PHYSICIAN ASSISTANT

## 2023-03-20 RX ORDER — CLOTRIMAZOLE 10 MG/1
LOZENGE ORAL; TOPICAL
COMMUNITY
Start: 2023-02-07

## 2023-03-20 RX ORDER — AMOXICILLIN 875 MG/1
875 TABLET, COATED ORAL 2 TIMES DAILY
Qty: 20 TABLET | Refills: 0 | Status: SHIPPED | OUTPATIENT
Start: 2023-03-20 | End: 2023-03-30

## 2023-03-20 SDOH — ECONOMIC STABILITY: FOOD INSECURITY: WITHIN THE PAST 12 MONTHS, THE FOOD YOU BOUGHT JUST DIDN'T LAST AND YOU DIDN'T HAVE MONEY TO GET MORE.: NEVER TRUE

## 2023-03-20 SDOH — ECONOMIC STABILITY: FOOD INSECURITY: WITHIN THE PAST 12 MONTHS, YOU WORRIED THAT YOUR FOOD WOULD RUN OUT BEFORE YOU GOT MONEY TO BUY MORE.: NEVER TRUE

## 2023-03-20 SDOH — ECONOMIC STABILITY: INCOME INSECURITY: HOW HARD IS IT FOR YOU TO PAY FOR THE VERY BASICS LIKE FOOD, HOUSING, MEDICAL CARE, AND HEATING?: NOT HARD AT ALL

## 2023-03-20 SDOH — ECONOMIC STABILITY: HOUSING INSECURITY
IN THE LAST 12 MONTHS, WAS THERE A TIME WHEN YOU DID NOT HAVE A STEADY PLACE TO SLEEP OR SLEPT IN A SHELTER (INCLUDING NOW)?: NO

## 2023-03-20 ASSESSMENT — PATIENT HEALTH QUESTIONNAIRE - PHQ9
SUM OF ALL RESPONSES TO PHQ QUESTIONS 1-9: 0
SUM OF ALL RESPONSES TO PHQ QUESTIONS 1-9: 0
1. LITTLE INTEREST OR PLEASURE IN DOING THINGS: 0
SUM OF ALL RESPONSES TO PHQ QUESTIONS 1-9: 0
SUM OF ALL RESPONSES TO PHQ QUESTIONS 1-9: 0
2. FEELING DOWN, DEPRESSED OR HOPELESS: 0
SUM OF ALL RESPONSES TO PHQ9 QUESTIONS 1 & 2: 0

## 2023-03-20 ASSESSMENT — ENCOUNTER SYMPTOMS
FACIAL SWELLING: 0
ABDOMINAL PAIN: 0
BACK PAIN: 1
CHEST TIGHTNESS: 0
COUGH: 1
SHORTNESS OF BREATH: 1
SINUS PRESSURE: 1
COLOR CHANGE: 0
ABDOMINAL DISTENTION: 0
SINUS PAIN: 1
WHEEZING: 0

## 2023-03-20 NOTE — PROGRESS NOTES
and vaginal pain. Musculoskeletal:  Positive for arthralgias, back pain and gait problem. Negative for myalgias. Chronic   Skin:  Negative for color change and rash. Neurological:  Positive for weakness and numbness (BLE). Negative for dizziness, light-headedness and headaches. Psychiatric/Behavioral:  Positive for dysphoric mood and sleep disturbance (improving.). The patient is not nervous/anxious. Past Medical History:   Diagnosis Date    Anxiety     Asthma     CAD (coronary artery disease)     CHF (congestive heart failure) ([facility])     Chronic back pain     COPD (chronic obstructive pulmonary disease) ([facility] [address]     Depression     Hypertension     Obesity     Pelvic pain in female 2/15/2018    Type 2 diabetes mellitus without complication ([facility] [address] 2/7/6347     Past Surgical History:   Procedure Laterality Date    BLADDER SUSPENSION  02/2014    CARPAL TUNNEL RELEASE  89 or 90    right hand     HYSTERECTOMY (CERVIX STATUS UNKNOWN)  2009    full    [place] [facility] ANTERPOST COLPORRAPHY W/CYSTO W/NTRCL RPR N/A 2/19/2018    EXCISION OF VAGINAL MESH, CYSTOSCOPY performed by Audie Steiner DO at [facility]      27 yrs ago     Social History     Socioeconomic History    Marital status:       Spouse name: Not on file    Number of children: Not on file    Years of education: Not on file    Highest education level: Not on file   Occupational History    Not on file   Tobacco Use    Smoking status: Every Day     Packs/day: 1.00     Years: 42.00     Pack years: 42.00     Types: Cigarettes     Start date: 5/6/1979    Smokeless tobacco: Never   Substance and Sexual Activity    Alcohol use: No    Drug use: No    Sexual activity: Yes     Partners: Male   Other Topics Concern    Not on file   Social History Narrative    Not on file     Social Determinants of Health     Financial Resource Strain: Low Risk     Difficulty of Paying Living Expenses: Not hard at all   Food Insecurity: No Food Insecurity

## 2023-03-21 LAB
HEPATITIS C ANTIBODY: NONREACTIVE
HIV AG/AB: NONREACTIVE
VITAMIN D 25-HYDROXY: 16.4 NG/ML

## 2023-03-21 RX ORDER — IBUPROFEN 800 MG/1
800 TABLET ORAL EVERY 8 HOURS PRN
Qty: 60 TABLET | Refills: 0 | Status: SHIPPED | OUTPATIENT
Start: 2023-03-21

## 2023-03-21 NOTE — TELEPHONE ENCOUNTER
Rx request   Requested Prescriptions     Pending Prescriptions Disp Refills    ibuprofen (ADVIL;MOTRIN) 800 MG tablet 60 tablet 0     Sig: Take 1 tablet by mouth every 8 hours as needed for Pain     LOV 3/20/2023  Next Visit Date:  Future Appointments   Date Time Provider Pino Mcintosh   3/23/2023  3:20 PM Bern[address]   6/20/2023  2:30 PM Payal Lamb PA-C [address] [place] [address]

## 2023-03-24 ENCOUNTER — TELEPHONE (OUTPATIENT)
Dept: FAMILY MEDICINE CLINIC | Age: 58
End: 2023-03-24

## 2023-03-24 NOTE — TELEPHONE ENCOUNTER
Patient states that she did not receive a printed prescription for the bariatric rollator at her appointment on 3/20/2023. Sent to [facility] in [facility] at [employer]. Callback # [phone]; ok to leave a VM.

## 2023-03-27 DIAGNOSIS — E88.81 DYSMETABOLIC SYNDROME: ICD-10-CM

## 2023-03-27 RX ORDER — ATORVASTATIN CALCIUM 20 MG/1
TABLET, FILM COATED ORAL
Qty: 90 TABLET | Refills: 4 | Status: SHIPPED | OUTPATIENT
Start: 2023-03-27

## 2023-03-27 RX ORDER — IRON HEME POLYPEPTIDE/FOLIC AC 12-1MG
5000 TABLET ORAL DAILY
Qty: 90 CAPSULE | Refills: 1 | Status: SHIPPED | OUTPATIENT
Start: 2023-03-27

## 2023-05-27 DIAGNOSIS — J42 CHRONIC BRONCHITIS, UNSPECIFIED CHRONIC BRONCHITIS TYPE (HCC): ICD-10-CM

## 2023-06-02 RX ORDER — ALBUTEROL SULFATE 90 UG/1
AEROSOL, METERED RESPIRATORY (INHALATION)
Qty: 17 G | Refills: 3 | OUTPATIENT
Start: 2023-06-02

## 2023-06-06 DIAGNOSIS — L03.116 CELLULITIS OF BOTH LOWER EXTREMITIES: ICD-10-CM

## 2023-06-06 DIAGNOSIS — L03.115 CELLULITIS OF BOTH LOWER EXTREMITIES: ICD-10-CM

## 2023-06-06 NOTE — TELEPHONE ENCOUNTER
Please approve or deny this refill request. The order is pended. Thank you.     LOV 8/1/2022    Next Visit Date:  Future Appointments   Date Time Provider [facility] [address]   6/20/2023  2:30 PM Amrita Benson PA-C [address] [facility]

## 2023-06-16 DIAGNOSIS — J42 CHRONIC BRONCHITIS, UNSPECIFIED CHRONIC BRONCHITIS TYPE (HCC): ICD-10-CM

## 2023-06-19 RX ORDER — ALBUTEROL SULFATE 90 UG/1
AEROSOL, METERED RESPIRATORY (INHALATION)
Qty: 17 G | Refills: 3 | Status: SHIPPED | OUTPATIENT
Start: 2023-06-19 | End: 2023-06-20 | Stop reason: SDUPTHER

## 2023-06-20 ENCOUNTER — TELEPHONE (OUTPATIENT)
Dept: CARDIOLOGY CLINIC | Age: 58
End: 2023-06-20

## 2023-06-20 ENCOUNTER — OFFICE VISIT (OUTPATIENT)
Dept: FAMILY MEDICINE CLINIC | Age: 58
End: 2023-06-20
Payer: MEDICARE

## 2023-06-20 VITALS
HEART RATE: 85 BPM | HEIGHT: 65 IN | DIASTOLIC BLOOD PRESSURE: 90 MMHG | WEIGHT: 293 LBS | RESPIRATION RATE: 16 BRPM | OXYGEN SATURATION: 90 % | BODY MASS INDEX: 48.82 KG/M2 | TEMPERATURE: 98.2 F | SYSTOLIC BLOOD PRESSURE: 140 MMHG

## 2023-06-20 DIAGNOSIS — J42 CHRONIC BRONCHITIS, UNSPECIFIED CHRONIC BRONCHITIS TYPE (HCC): Primary | ICD-10-CM

## 2023-06-20 DIAGNOSIS — I25.10 CORONARY ARTERY DISEASE INVOLVING NATIVE CORONARY ARTERY OF NATIVE HEART, UNSPECIFIED WHETHER ANGINA PRESENT: ICD-10-CM

## 2023-06-20 DIAGNOSIS — Z72.0 TOBACCO ABUSE: ICD-10-CM

## 2023-06-20 DIAGNOSIS — I10 ESSENTIAL (PRIMARY) HYPERTENSION: ICD-10-CM

## 2023-06-20 DIAGNOSIS — I11.0 HYPERTENSIVE HEART DISEASE WITH HEART FAILURE (HCC): ICD-10-CM

## 2023-06-20 DIAGNOSIS — E55.9 VITAMIN D DEFICIENCY: ICD-10-CM

## 2023-06-20 DIAGNOSIS — R73.09 ABNORMAL BLOOD SUGAR: ICD-10-CM

## 2023-06-20 DIAGNOSIS — G47.33 OSA (OBSTRUCTIVE SLEEP APNEA): ICD-10-CM

## 2023-06-20 PROBLEM — B34.9 VIRAL ILLNESS: Status: RESOLVED | Noted: 2023-03-20 | Resolved: 2023-06-20

## 2023-06-20 PROBLEM — R63.5 WEIGHT GAIN: Status: RESOLVED | Noted: 2020-01-29 | Resolved: 2023-06-20

## 2023-06-20 PROBLEM — R53.83 MALAISE AND FATIGUE: Status: RESOLVED | Noted: 2020-02-05 | Resolved: 2023-06-20

## 2023-06-20 PROBLEM — B96.89 ACUTE BACTERIAL SINUSITIS: Status: RESOLVED | Noted: 2023-03-20 | Resolved: 2023-06-20

## 2023-06-20 PROBLEM — R45.89 DEPRESSED MOOD: Status: RESOLVED | Noted: 2020-01-31 | Resolved: 2023-06-20

## 2023-06-20 PROBLEM — R53.81 MALAISE AND FATIGUE: Status: RESOLVED | Noted: 2020-02-05 | Resolved: 2023-06-20

## 2023-06-20 PROBLEM — J01.90 ACUTE BACTERIAL SINUSITIS: Status: RESOLVED | Noted: 2023-03-20 | Resolved: 2023-06-20

## 2023-06-20 PROBLEM — R79.9 ABNORMAL FINDING OF BLOOD CHEMISTRY, UNSPECIFIED: Status: RESOLVED | Noted: 2023-03-20 | Resolved: 2023-06-20

## 2023-06-20 PROCEDURE — 3080F DIAST BP >= 90 MM HG: CPT | Performed by: PHYSICIAN ASSISTANT

## 2023-06-20 PROCEDURE — 99214 OFFICE O/P EST MOD 30 MIN: CPT | Performed by: PHYSICIAN ASSISTANT

## 2023-06-20 PROCEDURE — 3077F SYST BP >= 140 MM HG: CPT | Performed by: PHYSICIAN ASSISTANT

## 2023-06-20 RX ORDER — ALBUTEROL SULFATE 90 UG/1
AEROSOL, METERED RESPIRATORY (INHALATION)
Qty: 17 G | Refills: 3 | Status: SHIPPED | OUTPATIENT
Start: 2023-06-20

## 2023-06-20 RX ORDER — CARVEDILOL 3.12 MG/1
TABLET ORAL
Qty: 180 TABLET | Refills: 3 | Status: SHIPPED | OUTPATIENT
Start: 2023-06-20

## 2023-06-20 RX ORDER — ATORVASTATIN CALCIUM 10 MG/1
TABLET, FILM COATED ORAL
COMMUNITY
Start: 2023-05-25 | End: 2023-06-20

## 2023-06-20 RX ORDER — SEMAGLUTIDE 0.68 MG/ML
0.25 INJECTION, SOLUTION SUBCUTANEOUS WEEKLY
Qty: 3 ML | Refills: 4 | Status: SHIPPED | OUTPATIENT
Start: 2023-06-20

## 2023-06-20 RX ORDER — VARENICLINE TARTRATE
KIT
Qty: 53 EACH | Refills: 0 | Status: SHIPPED | OUTPATIENT
Start: 2023-06-20

## 2023-06-20 ASSESSMENT — ENCOUNTER SYMPTOMS
COLOR CHANGE: 0
SHORTNESS OF BREATH: 1
ABDOMINAL DISTENTION: 0
FACIAL SWELLING: 0
COUGH: 1
SINUS PAIN: 1
BACK PAIN: 1
CHEST TIGHTNESS: 0
WHEEZING: 0
SINUS PRESSURE: 1
ABDOMINAL PAIN: 0

## 2023-06-20 NOTE — PROGRESS NOTES
Subjective  Madelin Pierce, 63 y.o. female presents today with:  Chief Complaint   Patient presents with    3 Month Follow-Up     Needs a new sleep study order     Hearing Loss     Has hearing loss in right ear        HPI  In the office today for follow up. Last OV with me: 3/23/23. Decided to not pursue bariatric surgery at this time. Would like to quit smoke first, and stop soda. History of abnormal blood sugars, insulin resistance. +metabolic syndrome. Has been using Trulicity weekly, would like to try ozempic if covered. She does not have glucometer at home, would like one. NO regular exercise given COPD. COPD-chronic, using oxygen faithfully--4-5 L at home. Does not have portable tanks. Was sick about a week ago. +sputum, thick. Mucinex was sent to pharmacy but wasn't approved/covered by insurance. She did not . Denies new or worsening SOB.  +WHITE. Needing albuterol inhaler refilled. Continues to smoke, 1 ppd. Open to trying chantix. Was able to quit before when she was taking chantix. Had bad dream, stopped medication. Open to trying again. Pulse ox broke, asking for a new pulse ox. History of KARISSA. Does not have proper mask/tubing at home. Needs to establish with pulmonology. Increases oxygen at night due to not having CPAP working. History of CHF. Not taking any cardiac medication other than 81 mg asa. Denies edema of LEs. Due to follow up with cardiology. Denies CP, dizziness. Stopped coreg a few months ago.  +tobacco use. Review of Systems   Constitutional:  Positive for activity change and fatigue. Negative for appetite change, chills, diaphoresis, fever and unexpected weight change. HENT:  Positive for sinus pressure and sinus pain. Negative for congestion, dental problem, facial swelling and postnasal drip. Respiratory:  Positive for cough and shortness of breath (improved with oxygen). Negative for chest tightness and wheezing.

## 2023-06-20 NOTE — TELEPHONE ENCOUNTER
----- Message from Heavenly Solomon PA-C sent at 6/20/2023  3:03 PM EDT -----  Needs a follow up please.

## 2023-07-14 ENCOUNTER — TELEPHONE (OUTPATIENT)
Dept: FAMILY MEDICINE CLINIC | Age: 58
End: 2023-07-14

## 2023-07-14 DIAGNOSIS — R73.09 ABNORMAL BLOOD SUGAR: ICD-10-CM

## 2023-07-14 RX ORDER — SEMAGLUTIDE 0.68 MG/ML
0.25 INJECTION, SOLUTION SUBCUTANEOUS WEEKLY
Qty: 3 ML | Refills: 4 | Status: SHIPPED | OUTPATIENT
Start: 2023-07-14 | End: 2023-07-20

## 2023-07-18 DIAGNOSIS — J42 CHRONIC BRONCHITIS, UNSPECIFIED CHRONIC BRONCHITIS TYPE (HCC): ICD-10-CM

## 2023-07-20 RX ORDER — ALBUTEROL SULFATE 90 UG/1
AEROSOL, METERED RESPIRATORY (INHALATION)
Qty: 17 G | Refills: 3 | Status: SHIPPED | OUTPATIENT
Start: 2023-07-20

## 2023-07-20 RX ORDER — DULAGLUTIDE 0.75 MG/.5ML
0.75 INJECTION, SOLUTION SUBCUTANEOUS WEEKLY
Qty: 4 ADJUSTABLE DOSE PRE-FILLED PEN SYRINGE | Refills: 5 | Status: SHIPPED | OUTPATIENT
Start: 2023-07-20

## 2023-07-21 ENCOUNTER — TELEPHONE (OUTPATIENT)
Dept: FAMILY MEDICINE CLINIC | Age: 58
End: 2023-07-21

## 2023-07-21 NOTE — TELEPHONE ENCOUNTER
Patient states that her insurance did cover the [address] [place] [address]. She is  picking it up 7/21/2023. She states that she took the Trulicity on 5/97/0003 and wants to know when she should start taking the Ozempic. She usually takes the Trulicity on Thursdays.     Please Advise    Callback [phone]

## 2023-07-25 DIAGNOSIS — L03.116 CELLULITIS OF BOTH LOWER EXTREMITIES: ICD-10-CM

## 2023-07-25 DIAGNOSIS — L03.115 CELLULITIS OF BOTH LOWER EXTREMITIES: ICD-10-CM

## 2023-08-02 ENCOUNTER — TELEPHONE (OUTPATIENT)
Dept: INTERNAL MEDICINE CLINIC | Age: 58
End: 2023-08-02

## 2023-08-03 DIAGNOSIS — R60.0 EDEMA OF BOTH LEGS: ICD-10-CM

## 2023-08-03 DIAGNOSIS — N39.41 URGE INCONTINENCE OF URINE: ICD-10-CM

## 2023-08-03 RX ORDER — OXYBUTYNIN CHLORIDE 5 MG/1
TABLET, EXTENDED RELEASE ORAL
Qty: 90 TABLET | Refills: 11 | OUTPATIENT
Start: 2023-08-03

## 2023-08-03 RX ORDER — POTASSIUM CHLORIDE 750 MG/1
TABLET, EXTENDED RELEASE ORAL
Qty: 180 TABLET | Refills: 11 | OUTPATIENT
Start: 2023-08-03

## 2023-08-09 ENCOUNTER — TELEPHONE (OUTPATIENT)
Dept: FAMILY MEDICINE CLINIC | Age: 58
End: 2023-08-09

## 2023-08-09 NOTE — TELEPHONE ENCOUNTER
Pt called and stated that ever since her bladder sling was removed years ago, she has incontinence issues. Pt has contacted company called [employer] and they will be contacting the office for prescription for incontinence supplies.     [employer] [phone]

## 2023-08-24 DIAGNOSIS — N39.41 URGE INCONTINENCE OF URINE: ICD-10-CM

## 2023-08-24 DIAGNOSIS — L03.116 CELLULITIS OF BOTH LOWER EXTREMITIES: ICD-10-CM

## 2023-08-24 DIAGNOSIS — R60.0 EDEMA OF BOTH LEGS: ICD-10-CM

## 2023-08-24 DIAGNOSIS — L03.115 CELLULITIS OF BOTH LOWER EXTREMITIES: ICD-10-CM

## 2023-08-24 RX ORDER — POTASSIUM CHLORIDE 750 MG/1
TABLET, EXTENDED RELEASE ORAL
Qty: 180 TABLET | Refills: 3 | OUTPATIENT
Start: 2023-08-24

## 2023-08-24 RX ORDER — OXYBUTYNIN CHLORIDE 5 MG/1
TABLET, EXTENDED RELEASE ORAL
Qty: 90 TABLET | Refills: 3 | OUTPATIENT
Start: 2023-08-24

## 2023-08-24 NOTE — TELEPHONE ENCOUNTER
Please approve or deny this refill request. The order is pended. Thank you.     LOV 6/20/2023    Next Visit Date:  Future Appointments   Date Time Provider [facility] [address]   9/21/2023  2:45 PM Joaquin Weston PA-C [address] [facility]

## 2023-09-01 DIAGNOSIS — I10 ESSENTIAL (PRIMARY) HYPERTENSION: ICD-10-CM

## 2023-09-01 NOTE — TELEPHONE ENCOUNTER
Patient is requesting medication refill.  Please approve or deny this request.    Rx requested:  Requested Prescriptions     Pending Prescriptions Disp Refills    carvedilol (COREG) 3.125 MG tablet [Pharmacy Med Name: carvedilol 3.125 mg tablet] 180 tablet 11     Sig: TAKE ONE TABLET BY MOUTH TWICE DAILY @9AM & 5PM         Last Office Visit:   6/20/2023      Next Visit Date:  Future Appointments   Date Time Provider [address] [place] [address]   9/21/2023  2:00 PM Cyndi Mark PA-C [address] [facility]

## 2023-09-02 RX ORDER — CARVEDILOL 3.12 MG/1
TABLET ORAL
Qty: 180 TABLET | Refills: 11 | Status: SHIPPED | OUTPATIENT
Start: 2023-09-02

## 2023-09-28 DIAGNOSIS — N39.41 URGE INCONTINENCE OF URINE: ICD-10-CM

## 2023-09-28 DIAGNOSIS — R60.0 EDEMA OF BOTH LEGS: ICD-10-CM

## 2023-09-28 RX ORDER — OXYBUTYNIN CHLORIDE 5 MG/1
TABLET, EXTENDED RELEASE ORAL
Qty: 90 TABLET | Refills: 3 | Status: SHIPPED | OUTPATIENT
Start: 2023-09-28

## 2023-09-28 RX ORDER — POTASSIUM CHLORIDE 750 MG/1
TABLET, EXTENDED RELEASE ORAL
Qty: 180 TABLET | Refills: 3 | Status: SHIPPED | OUTPATIENT
Start: 2023-09-28

## 2023-09-28 NOTE — TELEPHONE ENCOUNTER
Comments: na to make future     Last Office Visit (last PCP visit):   6/20/2023    Next Visit Date:  No future appointments. **If hasn't been seen in over a year OR hasn't followed up according to last diabetes/ADHD visit, make appointment for patient before sending refill to provider.     Rx requested:  Requested Prescriptions     Pending Prescriptions Disp Refills    potassium chloride (KLOR-CON M) 10 MEQ extended release tablet [Pharmacy Med Name: potassium chloride ER 10 mEq tablet,extended release(part/cryst)] 180 tablet 3     Sig: TAKE TWO TABLETS BY MOUTH DAILY AT 9AM    oxybutynin (DITROPAN-XL) 5 MG extended release tablet [Pharmacy Med Name: oxybutynin chloride ER 5 mg tablet,extended release 24 hr] 90 tablet 3     Sig: TAKE ONE TABLET BY MOUTH DAILY AT 9AM

## 2023-10-20 ENCOUNTER — HOSPITAL ENCOUNTER (OUTPATIENT)
Dept: SLEEP CENTER | Age: 58
Discharge: HOME | End: 2023-10-22
Payer: MEDICARE

## 2023-10-20 PROCEDURE — 95806 SLEEP STUDY UNATT&RESP EFFT: CPT

## 2023-10-27 ENCOUNTER — TELEPHONE (OUTPATIENT)
Dept: FAMILY MEDICINE CLINIC | Age: 58
End: 2023-10-27

## 2023-10-31 DIAGNOSIS — G47.34 NOCTURNAL HYPOXEMIA: ICD-10-CM

## 2023-10-31 DIAGNOSIS — L03.116 CELLULITIS OF BOTH LOWER EXTREMITIES: ICD-10-CM

## 2023-10-31 DIAGNOSIS — L03.115 CELLULITIS OF BOTH LOWER EXTREMITIES: ICD-10-CM

## 2023-10-31 DIAGNOSIS — G47.33 OSA (OBSTRUCTIVE SLEEP APNEA): Primary | ICD-10-CM

## 2023-11-01 NOTE — TELEPHONE ENCOUNTER
Comments:     Last Office Visit (last PCP visit):   6/20/2023    Next Visit Date:  Future Appointments   Date Time Provider [address]   11/6/2023  2:40 PM [address] [place] [address] [facility] [address] [facility]   12/18/2023  2:45 PM Abigail Ervin PA-C [facility]       **If hasn't been seen in over a year OR hasn't followed up according to last diabetes/ADHD visit, make appointment for patient before sending refill to provider.     Rx requested:  Requested Prescriptions     Pending Prescriptions Disp Refills    silver sulfADIAZINE (SSD) 1 % cream [Pharmacy Med Name: SSD 1 % topical cream] 400 g 11     Sig: APPLY TOPICALLY EVERY DAY (BULK)

## 2023-11-06 ENCOUNTER — HOSPITAL ENCOUNTER (OUTPATIENT)
Dept: WOMENS IMAGING | Age: 58
Discharge: HOME | End: 2023-11-08
Payer: MEDICARE

## 2023-11-06 VITALS — BODY MASS INDEX: 54.42 KG/M2 | HEIGHT: 65 IN

## 2023-11-06 DIAGNOSIS — Z12.31 BREAST CANCER SCREENING BY MAMMOGRAM: ICD-10-CM

## 2023-11-06 PROCEDURE — 77067 SCR MAMMO BI INCL CAD: CPT

## 2023-11-07 RX ORDER — IBUPROFEN 800 MG/1
800 TABLET ORAL EVERY 8 HOURS PRN
Qty: 60 TABLET | Refills: 2 | Status: SHIPPED | OUTPATIENT
Start: 2023-11-07

## 2023-11-07 NOTE — TELEPHONE ENCOUNTER
Comments:     Last Office Visit (last PCP visit):   6/20/2023    Next Visit Date:  Future Appointments   Date Time Provider [address]   12/18/2023  2:45 PM Norma Ervin PA-C [place] [facility]       **If hasn't been seen in over a year OR hasn't followed up according to last diabetes/ADHD visit, make appointment for patient before sending refill to provider.     Rx requested:  Requested Prescriptions     Pending Prescriptions Disp Refills    ibuprofen (ADVIL;MOTRIN) 800 MG tablet 60 tablet 2     Sig: Take 1 tablet by mouth every 8 hours as needed for Pain

## 2023-11-10 DIAGNOSIS — Z72.0 TOBACCO ABUSE: ICD-10-CM

## 2023-11-13 NOTE — TELEPHONE ENCOUNTER
Comments:     Last Office Visit (last PCP visit):   6/20/2023    Next Visit Date:  Future Appointments   Date Time Provider [address]   12/11/2023  1:45 PM Emerson De La Rosa MD [address] [place] [address]   12/18/2023  2:45 PM Jasper Ervin PA-C [facility] [place]       **If hasn't been seen in over a year OR hasn't followed up according to last diabetes/ADHD visit, make appointment for patient before sending refill to provider.     Rx requested:  Requested Prescriptions     Pending Prescriptions Disp Refills    Varenicline Tartrate, Starter, 0.5 MG X 11 & 1 MG X 42 TBPK [Pharmacy Med Name: VARENICLINE STARTING MONTH BOX] 53 each 0     Sig: TAKE AS DIRECTED IN PACKAGING

## 2023-11-14 RX ORDER — VARENICLINE TARTRATE 0.5 (11)-1
KIT ORAL
Qty: 53 EACH | Refills: 0 | Status: SHIPPED | OUTPATIENT
Start: 2023-11-14

## 2023-12-08 DIAGNOSIS — Z72.0 TOBACCO ABUSE: ICD-10-CM

## 2023-12-11 ENCOUNTER — OFFICE VISIT (OUTPATIENT)
Dept: FAMILY MEDICINE CLINIC | Age: 58
End: 2023-12-11
Payer: MEDICARE

## 2023-12-11 VITALS
HEIGHT: 65 IN | SYSTOLIC BLOOD PRESSURE: 130 MMHG | DIASTOLIC BLOOD PRESSURE: 78 MMHG | BODY MASS INDEX: 48.82 KG/M2 | WEIGHT: 293 LBS | HEART RATE: 89 BPM | OXYGEN SATURATION: 93 % | RESPIRATION RATE: 18 BRPM

## 2023-12-11 VITALS
WEIGHT: 293 LBS | HEART RATE: 89 BPM | SYSTOLIC BLOOD PRESSURE: 130 MMHG | BODY MASS INDEX: 48.82 KG/M2 | HEIGHT: 65 IN | OXYGEN SATURATION: 93 % | DIASTOLIC BLOOD PRESSURE: 80 MMHG | RESPIRATION RATE: 18 BRPM

## 2023-12-11 DIAGNOSIS — M25.50 MULTIPLE JOINT PAIN: ICD-10-CM

## 2023-12-11 DIAGNOSIS — Z00.00 MEDICARE ANNUAL WELLNESS VISIT, SUBSEQUENT: Primary | ICD-10-CM

## 2023-12-11 DIAGNOSIS — Z12.11 COLON CANCER SCREENING: ICD-10-CM

## 2023-12-11 DIAGNOSIS — I89.0 LYMPHEDEMA: ICD-10-CM

## 2023-12-11 DIAGNOSIS — Z23 NEED FOR VACCINATION: ICD-10-CM

## 2023-12-11 DIAGNOSIS — Z87.891 PERSONAL HISTORY OF TOBACCO USE: ICD-10-CM

## 2023-12-11 DIAGNOSIS — E55.9 VITAMIN D DEFICIENCY: ICD-10-CM

## 2023-12-11 DIAGNOSIS — I10 ESSENTIAL (PRIMARY) HYPERTENSION: ICD-10-CM

## 2023-12-11 DIAGNOSIS — I87.8 VENOUS STASIS: ICD-10-CM

## 2023-12-11 DIAGNOSIS — E78.2 MIXED HYPERLIPIDEMIA: ICD-10-CM

## 2023-12-11 DIAGNOSIS — J42 CHRONIC BRONCHITIS, UNSPECIFIED CHRONIC BRONCHITIS TYPE (HCC): ICD-10-CM

## 2023-12-11 LAB
ALBUMIN SERPL-MCNC: 4.3 G/DL (ref 3.5–4.6)
ALP SERPL-CCNC: 112 U/L (ref 40–130)
ALT SERPL-CCNC: 10 U/L (ref 0–33)
ANION GAP SERPL CALCULATED.3IONS-SCNC: 7 MEQ/L (ref 9–15)
AST SERPL-CCNC: 12 U/L (ref 0–35)
BILIRUB SERPL-MCNC: 0.3 MG/DL (ref 0.2–0.7)
BUN SERPL-MCNC: 12 MG/DL (ref 6–20)
CHLORIDE SERPL-SCNC: 99 MEQ/L (ref 95–107)
CHOLEST SERPL-MCNC: 237 MG/DL (ref 0–199)
CO2 SERPL-SCNC: 35 MEQ/L (ref 20–31)
CREAT SERPL-MCNC: 0.78 MG/DL (ref 0.5–0.9)
ERYTHROCYTE [DISTWIDTH] IN BLOOD BY AUTOMATED COUNT: 13.2 % (ref 11.5–14.5)
GLOBULIN SER CALC-MCNC: 2.8 G/DL (ref 2.3–3.5)
GLUCOSE SERPL-MCNC: 80 MG/DL (ref 70–99)
HCT VFR BLD AUTO: 46.6 % (ref 37–47)
HDLC SERPL-MCNC: 52 MG/DL (ref 40–59)
HGB BLD-MCNC: 14.5 G/DL (ref 12–16)
LDL CHOLESTEROL CALCULATED: 144 MG/DL (ref 0–129)
MCH RBC QN AUTO: 29.5 PG (ref 27–31.3)
MCHC RBC AUTO-ENTMCNC: 31.1 % (ref 33–37)
MCV RBC AUTO: 94.7 FL (ref 79.4–94.8)
PLATELET # BLD AUTO: 254 K/UL (ref 130–400)
POTASSIUM SERPL-SCNC: 4.7 MEQ/L (ref 3.4–4.9)
PROT SERPL-MCNC: 7.1 G/DL (ref 6.3–8)
RBC # BLD AUTO: 4.92 M/UL (ref 4.2–5.4)
SODIUM SERPL-SCNC: 141 MEQ/L (ref 135–144)
TRIGLYCERIDE, FASTING: 204 MG/DL (ref 0–150)
WBC # BLD AUTO: 9.2 K/UL (ref 4.8–10.8)

## 2023-12-11 PROCEDURE — G0296 VISIT TO DETERM LDCT ELIG: HCPCS | Performed by: PHYSICIAN ASSISTANT

## 2023-12-11 PROCEDURE — G8417 CALC BMI ABV UP PARAM F/U: HCPCS | Performed by: PHYSICIAN ASSISTANT

## 2023-12-11 PROCEDURE — G8484 FLU IMMUNIZE NO ADMIN: HCPCS | Performed by: PHYSICIAN ASSISTANT

## 2023-12-11 PROCEDURE — G8427 DOCREV CUR MEDS BY ELIG CLIN: HCPCS | Performed by: PHYSICIAN ASSISTANT

## 2023-12-11 PROCEDURE — 3074F SYST BP LT 130 MM HG: CPT | Performed by: PHYSICIAN ASSISTANT

## 2023-12-11 PROCEDURE — 99214 OFFICE O/P EST MOD 30 MIN: CPT | Performed by: PHYSICIAN ASSISTANT

## 2023-12-11 PROCEDURE — 3075F SYST BP GE 130 - 139MM HG: CPT | Performed by: PHYSICIAN ASSISTANT

## 2023-12-11 PROCEDURE — 4004F PT TOBACCO SCREEN RCVD TLK: CPT | Performed by: PHYSICIAN ASSISTANT

## 2023-12-11 PROCEDURE — 3017F COLORECTAL CA SCREEN DOC REV: CPT | Performed by: PHYSICIAN ASSISTANT

## 2023-12-11 PROCEDURE — G0439 PPPS, SUBSEQ VISIT: HCPCS | Performed by: PHYSICIAN ASSISTANT

## 2023-12-11 PROCEDURE — 3078F DIAST BP <80 MM HG: CPT | Performed by: PHYSICIAN ASSISTANT

## 2023-12-11 PROCEDURE — 3023F SPIROM DOC REV: CPT | Performed by: PHYSICIAN ASSISTANT

## 2023-12-11 RX ORDER — VARENICLINE TARTRATE 0.5 (11)-1
KIT ORAL
Qty: 53 EACH | Refills: 0 | Status: SHIPPED | OUTPATIENT
Start: 2023-12-11

## 2023-12-11 RX ORDER — FUROSEMIDE 40 MG/1
TABLET ORAL
COMMUNITY
Start: 2023-11-22

## 2023-12-11 RX ORDER — ZOSTER VACCINE RECOMBINANT, ADJUVANTED 50 MCG/0.5
0.5 KIT INTRAMUSCULAR SEE ADMIN INSTRUCTIONS
Qty: 0.5 ML | Refills: 0 | Status: SHIPPED | OUTPATIENT
Start: 2023-12-11 | End: 2024-06-08

## 2023-12-11 ASSESSMENT — PATIENT HEALTH QUESTIONNAIRE - PHQ9
5. POOR APPETITE OR OVEREATING: 2
1. LITTLE INTEREST OR PLEASURE IN DOING THINGS: 2
SUM OF ALL RESPONSES TO PHQ QUESTIONS 1-9: 11
SUM OF ALL RESPONSES TO PHQ QUESTIONS 1-9: 11
4. FEELING TIRED OR HAVING LITTLE ENERGY: 2
SUM OF ALL RESPONSES TO PHQ QUESTIONS 1-9: 11
6. FEELING BAD ABOUT YOURSELF - OR THAT YOU ARE A FAILURE OR HAVE LET YOURSELF OR YOUR FAMILY DOWN: 0
SUM OF ALL RESPONSES TO PHQ QUESTIONS 1-9: 11
8. MOVING OR SPEAKING SO SLOWLY THAT OTHER PEOPLE COULD HAVE NOTICED. OR THE OPPOSITE, BEING SO FIGETY OR RESTLESS THAT YOU HAVE BEEN MOVING AROUND A LOT MORE THAN USUAL: 0
10. IF YOU CHECKED OFF ANY PROBLEMS, HOW DIFFICULT HAVE THESE PROBLEMS MADE IT FOR YOU TO DO YOUR WORK, TAKE CARE OF THINGS AT HOME, OR GET ALONG WITH OTHER PEOPLE: 2
9. THOUGHTS THAT YOU WOULD BE BETTER OFF DEAD, OR OF HURTING YOURSELF: 0
3. TROUBLE FALLING OR STAYING ASLEEP: 2
SUM OF ALL RESPONSES TO PHQ9 QUESTIONS 1 & 2: 4
2. FEELING DOWN, DEPRESSED OR HOPELESS: 2
7. TROUBLE CONCENTRATING ON THINGS, SUCH AS READING THE NEWSPAPER OR WATCHING TELEVISION: 1

## 2023-12-11 NOTE — PROGRESS NOTES
(DITROPAN-XL) 5 MG extended release tablet TAKE ONE TABLET [facility] MOUTH DAILY [facility]   carvedilol (COREG) 3.125 MG tablet TAKE ONE TABLET BY MOUTH TWICE DAILY @9AM & 5PM  Zoie Ervin PA-C   albuterol sulfate HFA (PROVENTIL;VENTOLIN;PROAIR) 108 (90 Base) MCG/ACT inhaler INHALE TWO PUFFS TWICE DAILY (BULK)  Zoie Ervin PA-C   Dulaglutide (TRULICITY) 5.91 TL/4.9XR SOPN Inject 0.75 mg into the skin once a week  Zoie Ervin PA-C   atorvastatin (LIPITOR) 20 MG tablet TAKE ONE TABLET BY MOUTH DAILY AT 9PM (NIGHTLY)  Carlos Ervin PA-C   aspirin EC 81 MG EC tablet Take 1 tablet by mouth in the morning. Kirsten Claudio MD   [facility] by Does not apply route Good for 5 years. Expires 3/27/2026.   [place], [address] [place] [address] by Does not apply route  Carlos Ervin PA-C       CareTeam (Including outside providers/suppliers regularly involved in providing care):   Patient Care Team:  Luis Blood PA-C as PCP - General (Family Medicine)  Luis Blood PA-C as PCP - Empaneled Provider  Kirsten Claudio MD as Cardiologist (Cardiology)     Reviewed and updated this visit:

## 2023-12-11 NOTE — TELEPHONE ENCOUNTER
Comments:     Last Office Visit (last PCP visit):   6/20/2023    Next Visit Date:  Future Appointments   Date Time Provider [address]   12/11/2023  3:15 PM Betty Ervin   12/15/2023  1:15 PM Concetta Cline MD [facility]       **If hasn't been seen in over a year OR hasn't followed up according to last diabetes/ADHD visit, make appointment for patient before sending refill to provider.     Rx requested:  Requested Prescriptions     Pending Prescriptions Disp Refills    Varenicline Tartrate, Starter, 0.5 MG X 11 & 1 MG X 42 TBPK [Pharmacy Med Name: VARENICLINE STARTING MONTH BOX] 53 each 0     Sig: TAKE AS DIRECTED IN PACKAGING

## 2023-12-11 NOTE — PATIENT INSTRUCTIONS
and screening and assessments performed today your provider may have ordered immunizations, labs, imaging, and/or referrals for you. A list of these orders (if applicable) as well as your Preventive Care list are included within your After Visit Summary for your review. Other Preventive Recommendations:    A preventive eye exam performed by an eye specialist is recommended every 1-2 years to screen for glaucoma; cataracts, macular degeneration, and other eye disorders. A preventive dental visit is recommended every 6 months. Try to get at least 150 minutes of exercise per week or 10,000 steps per day on a pedometer . Order or download the FREE \"Exercise & Physical Activity: Your Everyday Guide\" from [employer]. Call 8-354.905.7425 or search [employer] online. You need 0649-3560 mg of calcium and 2320-7425 IU of vitamin D per day. It is possible to meet your calcium requirement with diet alone, but a vitamin D supplement is usually necessary to meet this goal.  When exposed to the sun, use a sunscreen that protects against both UVA and UVB radiation with an SPF of 30 or greater. Reapply every 2 to 3 hours or after sweating, drying off with a towel, or swimming. Always wear a seat belt when traveling in a car. Always wear a helmet when riding a bicycle or motorcycle.

## 2023-12-13 LAB — VITAMIN D 25-HYDROXY: 16 NG/ML (ref 30–100)

## 2023-12-14 PROBLEM — I87.8 VENOUS STASIS: Status: ACTIVE | Noted: 2023-12-14

## 2023-12-14 PROBLEM — I89.0 LYMPHEDEMA: Status: ACTIVE | Noted: 2023-12-14

## 2023-12-14 LAB — NUCLEAR IGG SER QL IA: NORMAL

## 2023-12-22 ENCOUNTER — TELEPHONE (OUTPATIENT)
Dept: FAMILY MEDICINE CLINIC | Age: 58
End: 2023-12-22

## 2023-12-22 NOTE — TELEPHONE ENCOUNTER
Patient states she needs an order for portable oxygen tanks and nose canulas.     Please advise    Callback to Timmy Schumacher (patient does not know name) [phone]

## 2023-12-24 LAB
CARBAMYLATED PROTEIN (CARP) ANTIBODY, IGG: 2 UNITS (ref 0–19)
RHEUMATOID FACT SER NEPH-ACNC: <10 IU/ML (ref 0–14)

## 2023-12-30 DIAGNOSIS — Z72.0 TOBACCO ABUSE: ICD-10-CM

## 2024-01-02 RX ORDER — VARENICLINE TARTRATE 0.5 (11)-1
KIT ORAL
Qty: 53 EACH | Refills: 0 | Status: SHIPPED | OUTPATIENT
Start: 2024-01-02

## 2024-01-02 NOTE — TELEPHONE ENCOUNTER
Comments:     Last Office Visit (last PCP visit):   12/11/2023    Next Visit Date:  Future Appointments   Date Time Provider Department Center   1/15/2024 11:00 AM Mandy German APRN - CNP [facility]   1/22/2024  2:15 PM Zoie Ervin PA-C [facility]       **If hasn't been seen in over a year OR hasn't followed up according to last diabetes/ADHD visit, make appointment for patient before sending refill to provider.    Rx requested:  Requested Prescriptions     Pending Prescriptions Disp Refills    Varenicline Tartrate, Starter, 0.5 MG X 11 & 1 MG X 42 TBPK [Pharmacy Med Name: VARENICLINE STARTING MONTH BOX] 53 each 0     Sig: TAKE AS DIRECTED IN PACKAGING

## 2024-01-04 ENCOUNTER — HOSPITAL ENCOUNTER (OUTPATIENT)
Dept: SLEEP CENTER | Age: 59
Discharge: HOME | End: 2024-01-06
Payer: MEDICARE

## 2024-01-04 PROCEDURE — 95811 POLYSOM 6/>YRS CPAP 4/> PARM: CPT

## 2024-01-04 RX ORDER — TIRZEPATIDE 2.5 MG/.5ML
2.5 INJECTION, SOLUTION SUBCUTANEOUS WEEKLY
Qty: 4 EACH | Refills: 0 | Status: SHIPPED | OUTPATIENT
Start: 2024-01-04

## 2024-01-04 NOTE — TELEPHONE ENCOUNTER
Please approve or deny request. Thank you!    Rx requested:  Requested Prescriptions     Pending Prescriptions Disp Refills    Tirzepatide (MOUNJARO) 2.5 MG/0.5ML SOPN SC injection 4 each 0     Sig: Inject 0.5 mLs into the skin once a week LOT [id number]  Exp 4/26/2025  X1  Patricia [place]         Last Office Visit:   12/11/2023      Next Visit Date:  Future Appointments   Date Time Provider Department Center   1/15/2024 11:00 AM Mandy German APRN - CNP [facility]   1/22/2024  2:15 PM Zoie Ervin PA-C [place]  [facility]

## 2024-01-16 DIAGNOSIS — G47.33 OSA (OBSTRUCTIVE SLEEP APNEA): ICD-10-CM

## 2024-01-16 RX ORDER — TIRZEPATIDE 2.5 MG/.5ML
2.5 INJECTION, SOLUTION SUBCUTANEOUS WEEKLY
Qty: 4 EACH | Refills: 0 | OUTPATIENT
Start: 2024-01-16

## 2024-01-17 DIAGNOSIS — Z72.0 TOBACCO ABUSE: ICD-10-CM

## 2024-01-18 NOTE — TELEPHONE ENCOUNTER
Comments:     Last Office Visit (last PCP visit):   12/11/2023    Next Visit Date:  Future Appointments   Date Time Provider Department Center   3/22/2024  2:45 PM Zoie Ervin PA-C [place]  [facility]       **If hasn't been seen in over a year OR hasn't followed up according to last diabetes/ADHD visit, make appointment for patient before sending refill to provider.    Rx requested:  Requested Prescriptions     Pending Prescriptions Disp Refills    Varenicline Tartrate, Starter, 0.5 MG X 11 & 1 MG X 42 TBPK [Pharmacy Med Name: VARENICLINE STARTING MONTH BOX] 53 each 0     Sig: TAKE AS DIRECTED IN PACKAGING

## 2024-01-19 RX ORDER — VARENICLINE TARTRATE 0.5 (11)-1
KIT ORAL
Qty: 53 EACH | Refills: 0 | Status: SHIPPED | OUTPATIENT
Start: 2024-01-19

## 2024-01-23 RX ORDER — TIRZEPATIDE 5 MG/.5ML
5 INJECTION, SOLUTION SUBCUTANEOUS WEEKLY
Qty: 4 ADJUSTABLE DOSE PRE-FILLED PEN SYRINGE | Refills: 5 | Status: SHIPPED | OUTPATIENT
Start: 2024-01-23

## 2024-01-23 RX ORDER — TIRZEPATIDE 2.5 MG/.5ML
2.5 INJECTION, SOLUTION SUBCUTANEOUS WEEKLY
Qty: 4 EACH | Refills: 0 | Status: CANCELLED | OUTPATIENT
Start: 2024-01-23

## 2024-01-23 NOTE — TELEPHONE ENCOUNTER
Pt states  argelia was supposed to call in a script for arthritis ,help her sleep also has not herd back from sleep lab for a C-PAP needs order , thank you

## 2024-01-23 NOTE — TELEPHONE ENCOUNTER
Patient states that her pharmacy has not received this Rx. Patient spoke with her pharmacy on 1/23/2024.    LOV  12/11/2023  Next  3/22/2024    # [phone]

## 2024-01-24 DIAGNOSIS — G47.33 OSA (OBSTRUCTIVE SLEEP APNEA): Primary | ICD-10-CM

## 2024-01-26 DIAGNOSIS — E88.810 DYSMETABOLIC SYNDROME: ICD-10-CM

## 2024-01-28 RX ORDER — ATORVASTATIN CALCIUM 10 MG/1
TABLET, FILM COATED ORAL
Qty: 90 TABLET | Refills: 3 | OUTPATIENT
Start: 2024-01-28

## 2024-01-28 RX ORDER — FUROSEMIDE 40 MG/1
TABLET ORAL
Qty: 90 TABLET | Refills: 3 | OUTPATIENT
Start: 2024-01-28

## 2024-01-28 NOTE — TELEPHONE ENCOUNTER
Patient needs OV. No refills given.     Requesting medication refill. Please approve or deny this request.    Rx requested:  Requested Prescriptions     Pending Prescriptions Disp Refills    atorvastatin (LIPITOR) 10 MG tablet [Pharmacy Med Name: atorvastatin 10 mg tablet] 90 tablet 3     Sig: TAKE ONE TABLET BY MOUTH DAILY AT 9PM NIGHTLY    furosemide (LASIX) 40 MG tablet [Pharmacy Med Name: furosemide 40 mg tablet] 90 tablet 3     Sig: TAKE ONE TABLET BY MOUTH DAILY AT 9AM         Last Office Visit:   8/1/2022      Next Visit Date:  Future Appointments   Date Time Provider Department Center   3/22/2024  2:45 PM Zoie Ervin PA-C [facility]

## 2024-02-02 RX ORDER — BUPROPION HYDROCHLORIDE 150 MG/1
150 TABLET ORAL EVERY MORNING
Qty: 90 TABLET | Refills: 1 | Status: SHIPPED | OUTPATIENT
Start: 2024-02-02

## 2024-02-02 NOTE — TELEPHONE ENCOUNTER
Comments:     Last Office Visit (last PCP visit):   12/11/2023    Next Visit Date:  Future Appointments   Date Time Provider Department Center   3/22/2024  2:45 PM Zoie Ervin PA-C [place]  [facility]       **If hasn't been seen in over a year OR hasn't followed up according to last diabetes/ADHD visit, make appointment for patient before sending refill to provider.    Rx requested:  Requested Prescriptions     Pending Prescriptions Disp Refills    buPROPion (WELLBUTRIN XL) 150 MG extended release tablet [Pharmacy Med Name: BUPROPION HCL  MG TABLET] 90 tablet 1     Sig: TAKE 1 TABLET BY MOUTH EVERY DAY IN THE MORNING

## 2024-02-05 ENCOUNTER — TELEPHONE (OUTPATIENT)
Dept: FAMILY MEDICINE CLINIC | Age: 59
End: 2024-02-05

## 2024-02-05 NOTE — TELEPHONE ENCOUNTER
Patient states she had her sleep study competed on 1/4/2024 and is waiting for a cpap machine to be ordered for her.    Please Advise.    # [phone]

## 2024-02-06 NOTE — TELEPHONE ENCOUNTER
I placed referral to pulmonology for sleep apnea.   Can we please see why this is closed? It was placed on 1/24/24.

## 2024-02-20 DIAGNOSIS — E88.810 DYSMETABOLIC SYNDROME: ICD-10-CM

## 2024-02-20 RX ORDER — FUROSEMIDE 40 MG/1
TABLET ORAL
Qty: 90 TABLET | Refills: 3 | OUTPATIENT
Start: 2024-02-20

## 2024-02-20 RX ORDER — ATORVASTATIN CALCIUM 10 MG/1
TABLET, FILM COATED ORAL
Qty: 90 TABLET | Refills: 3 | OUTPATIENT
Start: 2024-02-20

## 2024-02-20 NOTE — TELEPHONE ENCOUNTER
Patient needs OV. No refills given.     Requesting medication refill. Please approve or deny this request.    Rx requested:  Requested Prescriptions     Pending Prescriptions Disp Refills    atorvastatin (LIPITOR) 10 MG tablet [Pharmacy Med Name: atorvastatin 10 mg tablet] 90 tablet 3     Sig: TAKE ONE TABLET BY MOUTH DAILY AT 9PM NIGHTLY    furosemide (LASIX) 40 MG tablet [Pharmacy Med Name: furosemide 40 mg tablet] 90 tablet 3     Sig: TAKE ONE TABLET BY MOUTH DAILY AT 9AM         Last Office Visit:   8/1/2022      Next Visit Date:  Future Appointments   Date Time Provider Department Center   3/22/2024  2:45 PM Zoie Ervin PA-C [place] [facility]               Last refill 12/22/2023. Please approve or deny.

## 2024-03-22 ENCOUNTER — OFFICE VISIT (OUTPATIENT)
Dept: FAMILY MEDICINE CLINIC | Age: 59
End: 2024-03-22

## 2024-03-22 VITALS
DIASTOLIC BLOOD PRESSURE: 60 MMHG | SYSTOLIC BLOOD PRESSURE: 130 MMHG | HEIGHT: 65 IN | HEART RATE: 87 BPM | OXYGEN SATURATION: 89 % | RESPIRATION RATE: 16 BRPM | WEIGHT: 293 LBS | BODY MASS INDEX: 48.82 KG/M2

## 2024-03-22 DIAGNOSIS — E55.9 VITAMIN D DEFICIENCY: ICD-10-CM

## 2024-03-22 DIAGNOSIS — R73.09 ABNORMAL BLOOD SUGAR: ICD-10-CM

## 2024-03-22 DIAGNOSIS — M25.50 ARTHRALGIA, UNSPECIFIED JOINT: ICD-10-CM

## 2024-03-22 DIAGNOSIS — R60.0 EDEMA OF BOTH LEGS: ICD-10-CM

## 2024-03-22 DIAGNOSIS — J42 CHRONIC BRONCHITIS, UNSPECIFIED CHRONIC BRONCHITIS TYPE (HCC): ICD-10-CM

## 2024-03-22 DIAGNOSIS — G47.34 NOCTURNAL HYPOXIA: ICD-10-CM

## 2024-03-22 PROBLEM — I11.0 HYPERTENSIVE HEART DISEASE WITH HEART FAILURE (HCC): Status: RESOLVED | Noted: 2020-02-05 | Resolved: 2024-03-22

## 2024-03-22 LAB — HBA1C MFR BLD: 5.1 % (ref 4.8–5.9)

## 2024-03-22 RX ORDER — POTASSIUM CHLORIDE 750 MG/1
TABLET, EXTENDED RELEASE ORAL
Qty: 180 TABLET | Refills: 5 | Status: SHIPPED | OUTPATIENT
Start: 2024-03-22

## 2024-03-22 RX ORDER — FUROSEMIDE 40 MG/1
TABLET ORAL
Qty: 60 TABLET | Refills: 5 | Status: SHIPPED | OUTPATIENT
Start: 2024-03-22

## 2024-03-22 SDOH — ECONOMIC STABILITY: FOOD INSECURITY: WITHIN THE PAST 12 MONTHS, YOU WORRIED THAT YOUR FOOD WOULD RUN OUT BEFORE YOU GOT MONEY TO BUY MORE.: NEVER TRUE

## 2024-03-22 SDOH — ECONOMIC STABILITY: FOOD INSECURITY: WITHIN THE PAST 12 MONTHS, THE FOOD YOU BOUGHT JUST DIDN'T LAST AND YOU DIDN'T HAVE MONEY TO GET MORE.: NEVER TRUE

## 2024-03-22 SDOH — ECONOMIC STABILITY: INCOME INSECURITY: HOW HARD IS IT FOR YOU TO PAY FOR THE VERY BASICS LIKE FOOD, HOUSING, MEDICAL CARE, AND HEATING?: NOT HARD AT ALL

## 2024-03-22 ASSESSMENT — PATIENT HEALTH QUESTIONNAIRE - PHQ9
SUM OF ALL RESPONSES TO PHQ QUESTIONS 1-9: 0
1. LITTLE INTEREST OR PLEASURE IN DOING THINGS: NOT AT ALL
SUM OF ALL RESPONSES TO PHQ QUESTIONS 1-9: 0
SUM OF ALL RESPONSES TO PHQ QUESTIONS 1-9: 0
SUM OF ALL RESPONSES TO PHQ9 QUESTIONS 1 & 2: 0
2. FEELING DOWN, DEPRESSED OR HOPELESS: NOT AT ALL
SUM OF ALL RESPONSES TO PHQ QUESTIONS 1-9: 0

## 2024-03-22 NOTE — PROGRESS NOTES
Subjective  Yenny Negron, 58 y.o. female presents today with:  Chief Complaint   Patient presents with    Follow-up     6 week follow up        HPI  In the office today for follow up.  Last OV with me: 12/11/23.     Recently had sleep study with titration on 1/4/24.  Would like to discuss.  She has upcoming visit with pulmonology.  She would like to discuss supplies for KARISSA.  Has not received anything from medical supply.  Needs supplies ordered.    She is in need of BiPAP.      Would like to discuss weight loss.    She is very interested in a gastric  balloon to help with weight loss.  Unsure about bypass surgery.  She is open to referral to discuss/explore options.     She is trying to quit smoking, she does drink soda.  Actively trying to make lifestyle changes.   No exercise.      She has chronic joint/back pain.  Most likely related to her weight, inactivity.  She has taking NSAIDs in the past.    FRANCES work-up negative.     Review of Systems   Constitutional:  Positive for activity change, fatigue and unexpected weight change. Negative for appetite change, chills, diaphoresis and fever.   HENT:  Negative for congestion, dental problem, facial swelling, postnasal drip, sinus pressure and sinus pain.    Respiratory:  Positive for cough and shortness of breath (improved with oxygen). Negative for chest tightness and wheezing.    Cardiovascular:  Negative for chest pain, palpitations and leg swelling.   Gastrointestinal:  Negative for abdominal distention and abdominal pain.   Genitourinary:  Negative for difficulty urinating, dysuria, hematuria, pelvic pain, urgency and vaginal pain.   Musculoskeletal:  Positive for arthralgias, back pain and gait problem. Negative for myalgias.        Chronic   Skin:  Negative for color change and rash.   Neurological:  Negative for dizziness, weakness, light-headedness and headaches.   Psychiatric/Behavioral:  Positive for sleep disturbance (improving.). Negative for

## 2024-03-23 ASSESSMENT — ENCOUNTER SYMPTOMS
CHEST TIGHTNESS: 0
ABDOMINAL DISTENTION: 0
COLOR CHANGE: 0
SHORTNESS OF BREATH: 1
SINUS PAIN: 0
SINUS PRESSURE: 0
ABDOMINAL PAIN: 0
FACIAL SWELLING: 0
BACK PAIN: 1
COUGH: 1

## 2024-03-25 LAB
INSULIN FREE SERPL-ACNC: 11 UIU/ML (ref 3–25)
INSULIN SERPL-ACNC: 13 UIU/ML (ref 3–25)

## 2024-03-27 DIAGNOSIS — J42 CHRONIC BRONCHITIS, UNSPECIFIED CHRONIC BRONCHITIS TYPE (HCC): ICD-10-CM

## 2024-03-28 RX ORDER — ALBUTEROL SULFATE 90 UG/1
AEROSOL, METERED RESPIRATORY (INHALATION)
Qty: 13.4 G | Refills: 3 | Status: SHIPPED | OUTPATIENT
Start: 2024-03-28

## 2024-03-28 NOTE — TELEPHONE ENCOUNTER
Comments:     Last Office Visit (last PCP visit):   3/22/2024    Next Visit Date:  Future Appointments   Date Time Provider Department Center   4/15/2024  1:00 PM Dirk Gonzalez MD [place] [facility]   5/24/2024  2:45 PM Zoie Ervin PA-C [place] [facility]       **If hasn't been seen in over a year OR hasn't followed up according to last diabetes/ADHD visit, make appointment for patient before sending refill to provider.    Rx requested:  Requested Prescriptions     Pending Prescriptions Disp Refills    albuterol sulfate HFA (PROVENTIL;VENTOLIN;PROAIR) 108 (90 Base) MCG/ACT inhaler [Pharmacy Med Name: albuterol sulfate HFA 90 mcg/actuation aerosol inhaler] 13.4 g 3     Sig: INHALE TWO PUFFS BY MOUTH TWICE DAILY

## 2024-03-29 ENCOUNTER — TELEPHONE (OUTPATIENT)
Dept: FAMILY MEDICINE CLINIC | Age: 59
End: 2024-03-29

## 2024-03-29 NOTE — TELEPHONE ENCOUNTER
Abena from [employer] the patient is already currently on 3 liters of continuous oxygen using a cannula.    The new Rx she received is to inhale 2 liters of oxygen at bedtime/night.    She is asking if the patient will be on 3 liters during the day and 2 liters at night or only 2 liters at night?    They will also need updated testing info and office notes.    Please Advise.    # [phone]  ext. [phone]

## 2024-04-02 NOTE — TELEPHONE ENCOUNTER
Called pt and she stated that she is on 2 liters during the day and 3 liters at night because her CPAP broke

## 2024-04-05 DIAGNOSIS — E88.810 DYSMETABOLIC SYNDROME: ICD-10-CM

## 2024-04-05 DIAGNOSIS — R60.0 EDEMA OF BOTH LEGS: ICD-10-CM

## 2024-04-05 RX ORDER — FUROSEMIDE 40 MG/1
TABLET ORAL
Qty: 90 TABLET | Refills: 3 | OUTPATIENT
Start: 2024-04-05

## 2024-04-05 RX ORDER — ATORVASTATIN CALCIUM 10 MG/1
TABLET, FILM COATED ORAL
Qty: 90 TABLET | Refills: 3 | OUTPATIENT
Start: 2024-04-05

## 2024-04-05 NOTE — TELEPHONE ENCOUNTER
Needs an update rx for this printed and faxed please, will fax OV notes with it.        Fax for katalina [phone]

## 2024-04-05 NOTE — TELEPHONE ENCOUNTER
Patient needs OV. No refills given.     Requesting medication refill. Please approve or deny this request.    Rx requested:  Requested Prescriptions     Pending Prescriptions Disp Refills    furosemide (LASIX) 40 MG tablet [Pharmacy Med Name: furosemide 40 mg tablet] 90 tablet 3     Sig: TAKE ONE TABLET BY MOUTH DAILY AT 9AM    atorvastatin (LIPITOR) 10 MG tablet [Pharmacy Med Name: atorvastatin 10 mg tablet] 90 tablet 3     Sig: TAKE ONE TABLET BY MOUTH DAILY AT 9PM NIGHTLY         Last Office Visit:   8/1/2022      Next Visit Date:  Future Appointments   Date Time Provider Department Center   4/15/2024  1:00 PM Dirk Gonzalez MD [facility]   5/24/2024  2:45 PM Zoie Ervin PA-C [place] [facility]

## 2024-04-15 DIAGNOSIS — J44.9 CHRONIC OBSTRUCTIVE PULMONARY DISEASE, UNSPECIFIED COPD TYPE (HCC): ICD-10-CM

## 2024-04-17 ENCOUNTER — PATIENT MESSAGE (OUTPATIENT)
Dept: FAMILY MEDICINE CLINIC | Age: 59
End: 2024-04-17

## 2024-04-17 DIAGNOSIS — R21 RASH AND NONSPECIFIC SKIN ERUPTION: Primary | ICD-10-CM

## 2024-04-17 DIAGNOSIS — G47.33 OSA (OBSTRUCTIVE SLEEP APNEA): ICD-10-CM

## 2024-04-18 NOTE — TELEPHONE ENCOUNTER
From: Yenny Negron  To: Zoie Ervin  Sent: 4/17/2024 4:36 PM EDT  Subject: cpap    No one has gotten in touch yet about a cpap I'm really needing it can you please get on them so I can get it .also can you write me a scrip for something for a big rash under my breast and fat rolls something like neaspore powder something my insurance will pay for thank you

## 2024-04-19 DIAGNOSIS — R60.0 EDEMA OF BOTH LEGS: ICD-10-CM

## 2024-04-19 DIAGNOSIS — E88.810 DYSMETABOLIC SYNDROME: ICD-10-CM

## 2024-04-19 RX ORDER — FUROSEMIDE 40 MG/1
TABLET ORAL
Qty: 90 TABLET | Refills: 3 | OUTPATIENT
Start: 2024-04-19

## 2024-04-19 RX ORDER — ATORVASTATIN CALCIUM 10 MG/1
TABLET, FILM COATED ORAL
Qty: 90 TABLET | Refills: 3 | OUTPATIENT
Start: 2024-04-19

## 2024-04-19 NOTE — TELEPHONE ENCOUNTER
Requesting medication refill. Please approve or deny this request.    Rx requested:  Requested Prescriptions     Pending Prescriptions Disp Refills    atorvastatin (LIPITOR) 10 MG tablet [Pharmacy Med Name: atorvastatin 10 mg tablet] 90 tablet 3     Sig: TAKE ONE TABLET BY MOUTH DAILY AT 9PM NIGHTLY    furosemide (LASIX) 40 MG tablet [Pharmacy Med Name: furosemide 40 mg tablet] 90 tablet 3     Sig: TAKE ONE TABLET BY MOUTH DAILY AT 9AM         Last Office Visit:   8/1/2022      Next Visit Date:  Future Appointments   Date Time Provider Department Center   4/26/2024  3:00 PM Polo Granados MD [place] [facility]   5/23/2024  3:00 PM Dirk Gonzalez MD [place] [facility]   5/24/2024  2:45 PM Zoie Ervin PA-C [place]  [facility]   6/6/2024  1:30 PM Mihai Peters MD [facility]   6/17/2024  1:00 PM Bettye Harris RD [facility]               Last refill 03/22/2024. Please approve or deny.

## 2024-04-22 NOTE — TELEPHONE ENCOUNTER
Comments: Please advise    Last Office Visit (last PCP visit):   3/22/2024    Next Visit Date:  Future Appointments   Date Time Provider Department Center   4/26/2024  3:00 PM Polo Granados MD [place] [facility]   5/23/2024  3:00 PM Dirk Gonzalez MD [facility]   5/24/2024  2:45 PM Zoie Ervin PA-C [place] [facility]   6/6/2024  1:30 PM Mihai Peters MD [facility]   6/17/2024  1:00 PM Bettye Harris RD [facility]       **If hasn't been seen in over a year OR hasn't followed up according to last diabetes/ADHD visit, make appointment for patient before sending refill to provider.    Rx requested:  Requested Prescriptions     Pending Prescriptions Disp Refills    ipratropium 0.5 mg-albuterol 2.5 mg (DUONEB) 0.5-2.5 (3) MG/3ML SOLN nebulizer solution [Pharmacy Med Name: ipratropium 0.5 mg-albuterol 3 mg (2.5 mg base)/3 mL nebulization soln] 360 mL 11     Sig: INHALE THE CONTENTS OF 1 VIAL VIA NEBULIZER EVERY 4 HOURS AS NEEDED FOR SHORTNESS OF BREATH (BULK)

## 2024-04-24 RX ORDER — NYSTATIN 100000 [USP'U]/G
POWDER TOPICAL
Qty: 60 G | Refills: 2 | Status: SHIPPED | OUTPATIENT
Start: 2024-04-24

## 2024-04-24 RX ORDER — IPRATROPIUM BROMIDE AND ALBUTEROL SULFATE 2.5; .5 MG/3ML; MG/3ML
SOLUTION RESPIRATORY (INHALATION)
Qty: 360 ML | Refills: 11 | Status: SHIPPED | OUTPATIENT
Start: 2024-04-24

## 2024-04-26 ENCOUNTER — OFFICE VISIT (OUTPATIENT)
Dept: CARDIOLOGY CLINIC | Age: 59
End: 2024-04-26

## 2024-04-26 VITALS
HEART RATE: 75 BPM | BODY MASS INDEX: 48.82 KG/M2 | HEIGHT: 65 IN | OXYGEN SATURATION: 90 % | SYSTOLIC BLOOD PRESSURE: 142 MMHG | WEIGHT: 293 LBS | DIASTOLIC BLOOD PRESSURE: 70 MMHG

## 2024-04-26 DIAGNOSIS — I10 ESSENTIAL HYPERTENSION: ICD-10-CM

## 2024-04-26 DIAGNOSIS — L03.115 CELLULITIS OF BOTH LOWER EXTREMITIES: ICD-10-CM

## 2024-04-26 DIAGNOSIS — R09.89 BILATERAL CAROTID BRUITS: ICD-10-CM

## 2024-04-26 DIAGNOSIS — R60.0 EDEMA OF BOTH LEGS: ICD-10-CM

## 2024-04-26 DIAGNOSIS — L03.116 CELLULITIS OF BOTH LOWER EXTREMITIES: ICD-10-CM

## 2024-04-26 DIAGNOSIS — R07.9 CHEST PAIN, UNSPECIFIED TYPE: ICD-10-CM

## 2024-04-26 DIAGNOSIS — Z00.00 PE (PHYSICAL EXAM), ANNUAL: Primary | ICD-10-CM

## 2024-04-26 DIAGNOSIS — E78.5 DYSLIPIDEMIA: ICD-10-CM

## 2024-04-26 DIAGNOSIS — E88.810 DYSMETABOLIC SYNDROME: ICD-10-CM

## 2024-04-26 DIAGNOSIS — I25.83 CORONARY ARTERY DISEASE DUE TO LIPID RICH PLAQUE: ICD-10-CM

## 2024-04-26 DIAGNOSIS — R06.09 DOE (DYSPNEA ON EXERTION): ICD-10-CM

## 2024-04-26 DIAGNOSIS — I25.10 CORONARY ARTERY DISEASE DUE TO LIPID RICH PLAQUE: ICD-10-CM

## 2024-04-26 DIAGNOSIS — R60.0 LEG EDEMA: ICD-10-CM

## 2024-04-26 RX ORDER — ATORVASTATIN CALCIUM 80 MG/1
80 TABLET, FILM COATED ORAL DAILY
Qty: 90 TABLET | Refills: 3 | Status: SHIPPED | OUTPATIENT
Start: 2024-04-26

## 2024-04-26 ASSESSMENT — ENCOUNTER SYMPTOMS
STRIDOR: 0
EYES NEGATIVE: 1
BACK PAIN: 1
BLOOD IN STOOL: 0
NAUSEA: 0
COUGH: 0
CHEST TIGHTNESS: 0
SHORTNESS OF BREATH: 1
WHEEZING: 0
GASTROINTESTINAL NEGATIVE: 1

## 2024-04-26 NOTE — PROGRESS NOTES
Dispense:  90 tablet     Refill:  3       Assessment/Plan:    1. Coronary artery disease involving native coronary artery of native heart, unspecified whether angina present  She has mid dz but many risk factors. She will resume ASA Coreg and Statin     WHITE/CP - Stress and Echo  then RTO    2. Essential hypertension   continue coreg- low salt salt     3. LVH (left ventricular hypertrophy)       4. Diastolic dysfunction     - furosemide (LASIX) 20 MG tablet; Take 1 tablet by mouth daily  Dispense: 60 tablet; Refill: 3    5. Edema of both legs   Resume Lasix 40 qd.   - potassium chloride (KLOR-CON M) 10 MEQ extended release tablet; Take 2 tablets by mouth daily  Dispense: 180 tablet; Refill: 3    6. BMI 50.0-59.9, adult (HCC)     - atorvastatin (LIPITOR) 10 MG tablet; Take 1 tablet by mouth nightly  Dispense: 90 tablet; Refill: 4    7. Coronary artery disease involving native coronary artery of native heart     - atorvastatin (LIPITOR) 10 MG tablet; Take 1 tablet by mouth nightly  Dispense: 90 tablet; Refill: 4    8. Dysmetabolic syndrome     - atorvastatin (LIPITOR) 10 MG tablet; Take 1 tablet by mouth nightly  Dispense: 90 tablet; Refill: 4    9. Essential (primary) hypertension     - carvedilol (COREG) 3.125 MG tablet; TAKE 1 TABLET BY MOUTH TWICE A DAY  Dispense: 180 tablet; Refill: 3    10. Atherosclerotic heart disease of native coronary artery without angina pectoris     - carvedilol (COREG) 3.125 MG tablet; TAKE 1 TABLET BY MOUTH TWICE A DAY  Dispense: 180 tablet; Refill: 3    11. Dyslipidemia   statin. Low fat diet.      12. LE edema - B/L Vein Duplex     13 Carotid Bruits - CUS    Counseling:  Heart Healthy Lifestyle, Improve BMI, Low Salt Diet, Take Precautions to Prevent Falls and Walk Daily    Return for AFTER TESTS.      Electronically signed by KENDRICK ESCOTO MD on 4/26/2024 at 3:29 PM

## 2024-05-23 ENCOUNTER — OFFICE VISIT (OUTPATIENT)
Dept: PULMONOLOGY | Age: 59
End: 2024-05-23
Payer: MEDICARE

## 2024-05-23 VITALS
WEIGHT: 293 LBS | OXYGEN SATURATION: 90 % | RESPIRATION RATE: 18 BRPM | TEMPERATURE: 97.8 F | HEIGHT: 65 IN | SYSTOLIC BLOOD PRESSURE: 130 MMHG | DIASTOLIC BLOOD PRESSURE: 81 MMHG | HEART RATE: 76 BPM | BODY MASS INDEX: 48.82 KG/M2

## 2024-05-23 DIAGNOSIS — J44.9 CHRONIC OBSTRUCTIVE PULMONARY DISEASE, UNSPECIFIED COPD TYPE (HCC): ICD-10-CM

## 2024-05-23 DIAGNOSIS — F17.200 SMOKING: ICD-10-CM

## 2024-05-23 DIAGNOSIS — I27.20 PULMONARY HYPERTENSION (HCC): ICD-10-CM

## 2024-05-23 DIAGNOSIS — E16.1 NOCTURNAL HYPOGLYCEMIA: ICD-10-CM

## 2024-05-23 DIAGNOSIS — Z87.891 PERSONAL HISTORY OF TOBACCO USE: Primary | ICD-10-CM

## 2024-05-23 DIAGNOSIS — G47.33 OSA (OBSTRUCTIVE SLEEP APNEA): ICD-10-CM

## 2024-05-23 PROCEDURE — G0296 VISIT TO DETERM LDCT ELIG: HCPCS | Performed by: INTERNAL MEDICINE

## 2024-05-23 PROCEDURE — 99204 OFFICE O/P NEW MOD 45 MIN: CPT | Performed by: INTERNAL MEDICINE

## 2024-05-23 PROCEDURE — 3075F SYST BP GE 130 - 139MM HG: CPT | Performed by: INTERNAL MEDICINE

## 2024-05-23 PROCEDURE — 3079F DIAST BP 80-89 MM HG: CPT | Performed by: INTERNAL MEDICINE

## 2024-05-23 RX ORDER — VARENICLINE TARTRATE 0.5 (11)-1
KIT ORAL
Qty: 1 EACH | Refills: 0 | Status: SHIPPED | OUTPATIENT
Start: 2024-05-23

## 2024-05-23 ASSESSMENT — SLEEP AND FATIGUE QUESTIONNAIRES
HOW LIKELY ARE YOU TO NOD OFF OR FALL ASLEEP WHILE SITTING QUIETLY AFTER LUNCH WITHOUT ALCOHOL: HIGH CHANCE OF DOZING
HOW LIKELY ARE YOU TO NOD OFF OR FALL ASLEEP IN A CAR, WHILE STOPPED FOR A FEW MINUTES IN TRAFFIC: WOULD NEVER DOZE
ESS TOTAL SCORE: 15
HOW LIKELY ARE YOU TO NOD OFF OR FALL ASLEEP WHILE SITTING AND TALKING TO SOMEONE: WOULD NEVER DOZE
HOW LIKELY ARE YOU TO NOD OFF OR FALL ASLEEP WHILE LYING DOWN TO REST IN THE AFTERNOON WHEN CIRCUMSTANCES PERMIT: HIGH CHANCE OF DOZING
HOW LIKELY ARE YOU TO NOD OFF OR FALL ASLEEP WHEN YOU ARE A PASSENGER IN A CAR FOR AN HOUR WITHOUT A BREAK: HIGH CHANCE OF DOZING
HOW LIKELY ARE YOU TO NOD OFF OR FALL ASLEEP WHILE WATCHING TV: HIGH CHANCE OF DOZING
HOW LIKELY ARE YOU TO NOD OFF OR FALL ASLEEP WHILE SITTING AND READING: HIGH CHANCE OF DOZING
HOW LIKELY ARE YOU TO NOD OFF OR FALL ASLEEP WHILE SITTING INACTIVE IN A PUBLIC PLACE: WOULD NEVER DOZE

## 2024-05-23 NOTE — PROGRESS NOTES
making?     Answer:   Yes     Order Specific Question:   Is this a low dose CT or a routine CT?     Answer:   Low Dose CT [1]     Order Specific Question:   Is this the first (baseline) CT or an annual exam?     Answer:   Baseline [1]     Order Specific Question:   Does the patient show any signs or symptoms of lung cancer?     Answer:   No     Order Specific Question:   Smoking Status?     Answer:   Every Day [1]     Order Specific Question:   Pack Years     Answer:   46    Full PFT Study With Bronchodilator     Standing Status:   Future     Standing Expiration Date:   11/23/2025    TN VISIT TO DISCUSS LUNG CA SCREEN W LDCT    DME Order for BiPAP as OP     BI-LEVEL (RAD, bi-level  pressure capability  without backup rate feature) IPAP 17 cm H2O / EPAP 13 cm H2O    Heated Humidifier    Heated Tubing with Integrated Element 1 per 3 months    Full Face Mask 1 per 3 months and Cushions/Seals 1 per month    Headgear 1 per 6 months, Chin Strap 1 per 6 months, Disposable Filters 2 per month, Non-disposable Filters 1 per 6 months, Chambers 1 per 6 months and Other Related Supplies.        Replace supplies and accessories as needed.  Patient may choose another interface for compliance and comfort.  Comments: large size mask   O2 Bleed @ 3 L min     Diagnosis: KARISSA  Length of need: Lifetime     Orders Placed This Encounter   Medications    Varenicline Tartrate, Starter, 0.5 MG X 11 & 1 MG X 42 TBPK     Sig: Use as directed     Dispense:  1 each     Refill:  0            Discussed with patient the importance of exercise and weight control and  overall health and well-being.     Reviewed with the patient: current clinical status, medications, activities and diet.      Side effects, adverse effects of the medication prescribed today, as well as treatment plan and result expectations have been discussed with the patient who expresses understanding and desires to proceed.       Return in about 8 weeks (around

## 2024-06-07 RX ORDER — IBUPROFEN 800 MG/1
800 TABLET ORAL EVERY 8 HOURS PRN
Qty: 60 TABLET | Refills: 2 | Status: SHIPPED | OUTPATIENT
Start: 2024-06-07

## 2024-08-16 DIAGNOSIS — I10 ESSENTIAL (PRIMARY) HYPERTENSION: ICD-10-CM

## 2024-08-16 RX ORDER — CARVEDILOL 3.12 MG/1
TABLET ORAL
Qty: 180 TABLET | Refills: 3 | Status: SHIPPED | OUTPATIENT
Start: 2024-08-16

## 2024-08-16 NOTE — TELEPHONE ENCOUNTER
Comments:     Last Office Visit (last PCP visit):   3/22/2024    Next Visit Date:  No future appointments.    **If hasn't been seen in over a year OR hasn't followed up according to last diabetes/ADHD visit, make appointment for patient before sending refill to provider.    Rx requested:  Requested Prescriptions     Pending Prescriptions Disp Refills    carvedilol (COREG) 3.125 MG tablet [Pharmacy Med Name: CARVEDILOL 3.125 MG TABLET] 180 tablet 3     Sig: TAKE 1 TABLET BY MOUTH TWICE A DAY

## 2024-08-20 DIAGNOSIS — I10 ESSENTIAL (PRIMARY) HYPERTENSION: ICD-10-CM

## 2024-08-20 DIAGNOSIS — R60.0 EDEMA OF BOTH LEGS: ICD-10-CM

## 2024-08-20 RX ORDER — POTASSIUM CHLORIDE 750 MG/1
TABLET, EXTENDED RELEASE ORAL
Qty: 180 TABLET | Refills: 3 | OUTPATIENT
Start: 2024-08-20

## 2024-08-20 RX ORDER — CARVEDILOL 3.12 MG/1
TABLET ORAL
Qty: 180 TABLET | Refills: 11 | OUTPATIENT
Start: 2024-08-20

## 2024-08-21 ENCOUNTER — TELEPHONE (OUTPATIENT)
Dept: PULMONOLOGY | Age: 59
End: 2024-08-21

## 2024-08-21 DIAGNOSIS — N39.41 URGE INCONTINENCE OF URINE: ICD-10-CM

## 2024-08-21 NOTE — TELEPHONE ENCOUNTER
Comments:     Last Office Visit (last PCP visit):   3/22/2024    Next Visit Date:  Future Appointments   Date Time Provider Department Center   9/24/2024  1:45 PM Dirk Gonzalez MD [facility]       **If hasn't been seen in over a year OR hasn't followed up according to last diabetes/ADHD visit, make appointment for patient before sending refill to provider.    Rx requested:  Requested Prescriptions     Pending Prescriptions Disp Refills    oxyBUTYnin (DITROPAN-XL) 5 MG extended release tablet [Pharmacy Med Name: oxybutynin chloride ER 5 mg tablet,extended release 24 hr] 90 tablet 11     Sig: TAKE ONE TABLET BY MOUTH DAILY AT 9AM

## 2024-08-22 RX ORDER — OXYBUTYNIN CHLORIDE 5 MG/1
TABLET, EXTENDED RELEASE ORAL
Qty: 90 TABLET | Refills: 11 | Status: SHIPPED | OUTPATIENT
Start: 2024-08-22

## 2024-08-24 DIAGNOSIS — J42 CHRONIC BRONCHITIS, UNSPECIFIED CHRONIC BRONCHITIS TYPE (HCC): ICD-10-CM

## 2024-08-24 DIAGNOSIS — N39.41 URGE INCONTINENCE OF URINE: ICD-10-CM

## 2024-08-24 DIAGNOSIS — L03.116 CELLULITIS OF BOTH LOWER EXTREMITIES: ICD-10-CM

## 2024-08-24 DIAGNOSIS — R60.0 EDEMA OF BOTH LEGS: ICD-10-CM

## 2024-08-24 DIAGNOSIS — J44.9 CHRONIC OBSTRUCTIVE PULMONARY DISEASE, UNSPECIFIED COPD TYPE (HCC): ICD-10-CM

## 2024-08-24 DIAGNOSIS — L03.115 CELLULITIS OF BOTH LOWER EXTREMITIES: ICD-10-CM

## 2024-08-24 DIAGNOSIS — M25.50 ARTHRALGIA, UNSPECIFIED JOINT: ICD-10-CM

## 2024-08-24 DIAGNOSIS — F17.200 SMOKING: ICD-10-CM

## 2024-08-24 DIAGNOSIS — R21 RASH AND NONSPECIFIC SKIN ERUPTION: ICD-10-CM

## 2024-08-24 DIAGNOSIS — I10 ESSENTIAL (PRIMARY) HYPERTENSION: ICD-10-CM

## 2024-08-26 RX ORDER — BUPROPION HYDROCHLORIDE 150 MG/1
150 TABLET ORAL EVERY MORNING
Qty: 90 TABLET | Refills: 1 | Status: SHIPPED | OUTPATIENT
Start: 2024-08-26

## 2024-08-26 RX ORDER — ALBUTEROL SULFATE 90 UG/1
AEROSOL, METERED RESPIRATORY (INHALATION)
Qty: 13.4 G | Refills: 3 | Status: SHIPPED | OUTPATIENT
Start: 2024-08-26

## 2024-08-26 RX ORDER — NYSTATIN 100000 [USP'U]/G
POWDER TOPICAL
Qty: 60 G | Refills: 2 | Status: SHIPPED | OUTPATIENT
Start: 2024-08-26

## 2024-08-26 RX ORDER — VARENICLINE TARTRATE 0.5 (11)-1
KIT ORAL
Qty: 1 EACH | Refills: 0 | Status: SHIPPED | OUTPATIENT
Start: 2024-08-26

## 2024-08-26 RX ORDER — ATORVASTATIN CALCIUM 80 MG/1
80 TABLET, FILM COATED ORAL DAILY
Qty: 90 TABLET | Refills: 3 | Status: SHIPPED | OUTPATIENT
Start: 2024-08-26

## 2024-08-26 RX ORDER — CARVEDILOL 3.12 MG/1
TABLET ORAL
Qty: 180 TABLET | Refills: 3 | Status: SHIPPED | OUTPATIENT
Start: 2024-08-26

## 2024-08-26 RX ORDER — POTASSIUM CHLORIDE 750 MG/1
TABLET, EXTENDED RELEASE ORAL
Qty: 180 TABLET | Refills: 5 | Status: SHIPPED | OUTPATIENT
Start: 2024-08-26

## 2024-08-26 RX ORDER — OXYBUTYNIN CHLORIDE 5 MG/1
TABLET, EXTENDED RELEASE ORAL
Qty: 90 TABLET | Refills: 11 | Status: SHIPPED | OUTPATIENT
Start: 2024-08-26

## 2024-08-26 RX ORDER — DICLOFENAC SODIUM 100 MG/1
100 TABLET, FILM COATED, EXTENDED RELEASE ORAL 2 TIMES DAILY PRN
Qty: 60 TABLET | Refills: 2 | Status: SHIPPED | OUTPATIENT
Start: 2024-08-26

## 2024-08-26 RX ORDER — IPRATROPIUM BROMIDE AND ALBUTEROL SULFATE 2.5; .5 MG/3ML; MG/3ML
1 SOLUTION RESPIRATORY (INHALATION) EVERY 4 HOURS PRN
Qty: 360 ML | Refills: 11 | Status: SHIPPED | OUTPATIENT
Start: 2024-08-26

## 2024-08-26 RX ORDER — SILVER SULFADIAZINE 10 MG/G
CREAM TOPICAL
Qty: 400 G | Refills: 11 | Status: SHIPPED | OUTPATIENT
Start: 2024-08-26

## 2024-08-26 RX ORDER — IBUPROFEN 800 MG/1
800 TABLET, FILM COATED ORAL EVERY 8 HOURS PRN
Qty: 60 TABLET | Refills: 2 | Status: SHIPPED | OUTPATIENT
Start: 2024-08-26

## 2024-08-26 RX ORDER — FUROSEMIDE 40 MG
TABLET ORAL
Qty: 60 TABLET | Refills: 5 | Status: SHIPPED | OUTPATIENT
Start: 2024-08-26

## 2024-08-26 NOTE — TELEPHONE ENCOUNTER
Rx requested:  Requested Prescriptions     Pending Prescriptions Disp Refills    Varenicline Tartrate, Starter, 0.5 MG X 11 & 1 MG X 42 TBPK 1 each 0     Sig: Use as directed       Last Office Visit:   5/23/2024      Next Visit Date:  Future Appointments   Date Time Provider Department Center   9/24/2024  1:45 PM Dirk Gonzalez MD [facility]

## 2024-08-26 NOTE — TELEPHONE ENCOUNTER
Comments:     Last Office Visit (last PCP visit):   3/22/2024    Next Visit Date:  Future Appointments   Date Time Provider Department Center   9/24/2024  1:45 PM Dirk Gonzalez MD [facility]       **If hasn't been seen in over a year OR hasn't followed up according to last diabetes/ADHD visit, make appointment for patient before sending refill to provider.    Rx requested:  Requested Prescriptions     Pending Prescriptions Disp Refills    silver sulfADIAZINE (SSD) 1 % cream 400 g 11     Sig: APPLY TOPICALLY EVERY DAY (BULK)    buPROPion (WELLBUTRIN XL) 150 MG extended release tablet 90 tablet 1     Sig: Take 1 tablet by mouth every morning    potassium chloride (KLOR-CON M) 10 MEQ extended release tablet 180 tablet 5     Sig: TAKE TWO TABLETS BY MOUTH DAILY AT 9AM    furosemide (LASIX) 40 MG tablet 60 tablet 5     Sig: TAKE ONE TABLET BY MOUTH DAILY AT 9AM    Diclofenac Sodium 100 MG TB24 60 tablet 2     Sig: Take 100 mg by mouth 2 times daily as needed (joint pain)    albuterol sulfate HFA (PROVENTIL;VENTOLIN;PROAIR) 108 (90 Base) MCG/ACT inhaler 13.4 g 3     Sig: INHALE TWO PUFFS BY MOUTH TWICE DAILY    ipratropium 0.5 mg-albuterol 2.5 mg (DUONEB) 0.5-2.5 (3) MG/3ML SOLN nebulizer solution 360 mL 11    nystatin (MYCOSTATIN) 007743 UNIT/GM powder 60 g 2     Sig: Apply 3 times daily.    ibuprofen (ADVIL;MOTRIN) 800 MG tablet 60 tablet 2     Sig: Take 1 tablet by mouth every 8 hours as needed for Pain    carvedilol (COREG) 3.125 MG tablet 180 tablet 3     Sig: TAKE 1 TABLET BY MOUTH TWICE A DAY    oxyBUTYnin (DITROPAN-XL) 5 MG extended release tablet 90 tablet 11     Sig: TAKE ONE TABLET BY MOUTH DAILY AT 9AM

## 2024-08-26 NOTE — TELEPHONE ENCOUNTER
Requesting medication refill. Please approve or deny this request.    Rx requested:  Requested Prescriptions     Pending Prescriptions Disp Refills    atorvastatin (LIPITOR) 80 MG tablet 90 tablet 3     Sig: Take 1 tablet by mouth daily         Last Office Visit:   4/26/2024      Next Visit Date:  Future Appointments   Date Time Provider Department Center   9/24/2024  1:45 PM Dirk Gonzalez MD [facility]               Last refill 04/26/2024. Please approve or deny.

## 2024-09-18 ENCOUNTER — TELEPHONE (OUTPATIENT)
Dept: FAMILY MEDICINE CLINIC | Age: 59
End: 2024-09-18

## 2024-09-24 ENCOUNTER — TELEMEDICINE (OUTPATIENT)
Dept: PULMONOLOGY | Age: 59
End: 2024-09-24
Payer: MEDICARE

## 2024-09-24 DIAGNOSIS — G47.33 OSA (OBSTRUCTIVE SLEEP APNEA): Primary | ICD-10-CM

## 2024-09-24 DIAGNOSIS — I27.20 PULMONARY HYPERTENSION (HCC): ICD-10-CM

## 2024-09-24 DIAGNOSIS — J44.9 CHRONIC OBSTRUCTIVE PULMONARY DISEASE, UNSPECIFIED COPD TYPE (HCC): ICD-10-CM

## 2024-09-24 DIAGNOSIS — F17.200 SMOKING: ICD-10-CM

## 2024-09-24 PROCEDURE — 99443 PR PHYS/QHP TELEPHONE EVALUATION 21-30 MIN: CPT | Performed by: INTERNAL MEDICINE

## 2024-10-09 ENCOUNTER — OFFICE VISIT (OUTPATIENT)
Dept: FAMILY MEDICINE CLINIC | Age: 59
End: 2024-10-09
Payer: MEDICARE

## 2024-10-09 VITALS
SYSTOLIC BLOOD PRESSURE: 138 MMHG | BODY MASS INDEX: 48.82 KG/M2 | OXYGEN SATURATION: 96 % | RESPIRATION RATE: 16 BRPM | HEART RATE: 87 BPM | WEIGHT: 293 LBS | HEIGHT: 65 IN | DIASTOLIC BLOOD PRESSURE: 80 MMHG

## 2024-10-09 DIAGNOSIS — Z23 NEED FOR PNEUMOCOCCAL VACCINE: ICD-10-CM

## 2024-10-09 DIAGNOSIS — E88.810 DYSMETABOLIC SYNDROME: ICD-10-CM

## 2024-10-09 DIAGNOSIS — I25.10 CORONARY ARTERY DISEASE INVOLVING NATIVE CORONARY ARTERY OF NATIVE HEART, UNSPECIFIED WHETHER ANGINA PRESENT: ICD-10-CM

## 2024-10-09 DIAGNOSIS — E78.2 MIXED HYPERLIPIDEMIA: ICD-10-CM

## 2024-10-09 DIAGNOSIS — M17.0 PRIMARY OSTEOARTHRITIS OF BOTH KNEES: ICD-10-CM

## 2024-10-09 DIAGNOSIS — J30.2 SEASONAL ALLERGIES: ICD-10-CM

## 2024-10-09 DIAGNOSIS — I10 ESSENTIAL (PRIMARY) HYPERTENSION: ICD-10-CM

## 2024-10-09 DIAGNOSIS — R53.81 OTHER MALAISE: ICD-10-CM

## 2024-10-09 DIAGNOSIS — N32.81 OAB (OVERACTIVE BLADDER): ICD-10-CM

## 2024-10-09 DIAGNOSIS — I87.8 VENOUS STASIS: ICD-10-CM

## 2024-10-09 DIAGNOSIS — N39.41 URGE INCONTINENCE OF URINE: ICD-10-CM

## 2024-10-09 DIAGNOSIS — Z12.31 BREAST CANCER SCREENING BY MAMMOGRAM: ICD-10-CM

## 2024-10-09 DIAGNOSIS — G62.9 PERIPHERAL POLYNEUROPATHY: ICD-10-CM

## 2024-10-09 DIAGNOSIS — R73.09 ABNORMAL BLOOD SUGAR: ICD-10-CM

## 2024-10-09 PROCEDURE — 99214 OFFICE O/P EST MOD 30 MIN: CPT | Performed by: PHYSICIAN ASSISTANT

## 2024-10-09 PROCEDURE — 3075F SYST BP GE 130 - 139MM HG: CPT | Performed by: PHYSICIAN ASSISTANT

## 2024-10-09 PROCEDURE — 90677 PCV20 VACCINE IM: CPT | Performed by: PHYSICIAN ASSISTANT

## 2024-10-09 PROCEDURE — G0009 ADMIN PNEUMOCOCCAL VACCINE: HCPCS | Performed by: PHYSICIAN ASSISTANT

## 2024-10-09 PROCEDURE — 3079F DIAST BP 80-89 MM HG: CPT | Performed by: PHYSICIAN ASSISTANT

## 2024-10-09 RX ORDER — FLUTICASONE PROPIONATE 50 MCG
1 SPRAY, SUSPENSION (ML) NASAL DAILY
Qty: 32 G | Refills: 2 | Status: SHIPPED | OUTPATIENT
Start: 2024-10-09

## 2024-10-09 RX ORDER — ATORVASTATIN CALCIUM 40 MG/1
40 TABLET, FILM COATED ORAL NIGHTLY
Qty: 90 TABLET | Refills: 4 | Status: SHIPPED | OUTPATIENT
Start: 2024-10-09

## 2024-10-09 RX ORDER — KETOROLAC TROMETHAMINE 10 MG/1
10 TABLET, FILM COATED ORAL EVERY 6 HOURS PRN
Qty: 20 TABLET | Refills: 0 | Status: SHIPPED | OUTPATIENT
Start: 2024-10-09 | End: 2025-10-09

## 2024-10-09 RX ORDER — SEMAGLUTIDE 0.68 MG/ML
0.25 INJECTION, SOLUTION SUBCUTANEOUS WEEKLY
Qty: 3 ML | Refills: 0 | Status: SHIPPED | OUTPATIENT
Start: 2024-10-09

## 2024-10-09 ASSESSMENT — ENCOUNTER SYMPTOMS
FACIAL SWELLING: 0
WHEEZING: 0
BACK PAIN: 1
ABDOMINAL DISTENTION: 0
SINUS PAIN: 0
SINUS PRESSURE: 0
SHORTNESS OF BREATH: 0
COUGH: 0
CHEST TIGHTNESS: 0
COLOR CHANGE: 1
ABDOMINAL PAIN: 0

## 2024-10-09 NOTE — PROGRESS NOTES
3/27/2026. 1 each 0     No current facility-administered medications for this visit.     PMH, Surgical Hx, Family Hx, and Social Hx reviewed and updated.  Health Maintenance reviewed.    Objective  Vitals:    10/09/24 1439   BP: 138/80   Site: Right Upper Arm   Position: Sitting   Cuff Size: Large Adult   Pulse: 87   Resp: 16   SpO2: 96%   Weight: (!) 152 kg (335 lb)   Height: 1.651 m (5' 5\")     BP Readings from Last 3 Encounters:   10/09/24 138/80   05/23/24 130/81   04/26/24 (!) 142/70     Wt Readings from Last 3 Encounters:   10/09/24 (!) 152 kg (335 lb)   05/23/24 (!) 151 kg (333 lb)   04/26/24 (!) 153 kg (337 lb 3.2 oz)     Physical Exam  Vitals reviewed.   Constitutional:       General: She is not in acute distress.     Appearance: She is obese. She is not ill-appearing, toxic-appearing or diaphoretic.   HENT:      Head: Normocephalic and atraumatic.      Right Ear: External ear normal. There is no impacted cerumen.      Left Ear: External ear normal. There is no impacted cerumen.      Nose: No congestion.      Right Sinus: No maxillary sinus tenderness.      Left Sinus: No maxillary sinus tenderness.      Mouth/Throat:      Mouth: Mucous membranes are moist.      Pharynx: Oropharynx is clear.   Eyes:      Conjunctiva/sclera: Conjunctivae normal.   Cardiovascular:      Rate and Rhythm: Normal rate and regular rhythm.      Heart sounds: Normal heart sounds. No murmur heard.  Pulmonary:      Effort: Pulmonary effort is normal. No respiratory distress.      Breath sounds: Normal breath sounds. No stridor. No wheezing or rhonchi.   Abdominal:      Comments: Obese abdomen   Musculoskeletal:         General: No tenderness.      Right lower leg: No edema.      Left lower leg: No edema.   Skin:     General: Skin is warm.      Coloration: Skin is not jaundiced or pale.      Findings: Erythema (venous stasis changes to LEs no drainage, open wounds.) present. No bruising or rash.   Neurological:      General: No focal  DANIEL Chen

## 2024-10-11 ENCOUNTER — TELEPHONE (OUTPATIENT)
Dept: FAMILY MEDICINE CLINIC | Age: 59
End: 2024-10-11

## 2024-11-05 ENCOUNTER — OFFICE VISIT (OUTPATIENT)
Dept: PULMONOLOGY | Age: 59
End: 2024-11-05
Payer: MEDICARE

## 2024-11-05 VITALS
WEIGHT: 293 LBS | DIASTOLIC BLOOD PRESSURE: 82 MMHG | SYSTOLIC BLOOD PRESSURE: 155 MMHG | OXYGEN SATURATION: 92 % | RESPIRATION RATE: 16 BRPM | HEART RATE: 72 BPM | HEIGHT: 65 IN | BODY MASS INDEX: 48.82 KG/M2

## 2024-11-05 DIAGNOSIS — I27.20 PULMONARY HYPERTENSION (HCC): ICD-10-CM

## 2024-11-05 DIAGNOSIS — I10 ESSENTIAL (PRIMARY) HYPERTENSION: ICD-10-CM

## 2024-11-05 DIAGNOSIS — E78.2 MIXED HYPERLIPIDEMIA: ICD-10-CM

## 2024-11-05 DIAGNOSIS — I25.10 CORONARY ARTERY DISEASE INVOLVING NATIVE CORONARY ARTERY OF NATIVE HEART, UNSPECIFIED WHETHER ANGINA PRESENT: ICD-10-CM

## 2024-11-05 DIAGNOSIS — J44.9 CHRONIC OBSTRUCTIVE PULMONARY DISEASE, UNSPECIFIED COPD TYPE (HCC): ICD-10-CM

## 2024-11-05 DIAGNOSIS — G47.33 OSA (OBSTRUCTIVE SLEEP APNEA): Primary | ICD-10-CM

## 2024-11-05 DIAGNOSIS — F17.200 SMOKING: ICD-10-CM

## 2024-11-05 LAB
ALBUMIN SERPL-MCNC: 4 G/DL (ref 3.5–4.6)
ALP SERPL-CCNC: 137 U/L (ref 40–130)
ALT SERPL-CCNC: 12 U/L (ref 0–33)
ANION GAP SERPL CALCULATED.3IONS-SCNC: 8 MEQ/L (ref 9–15)
AST SERPL-CCNC: 10 U/L (ref 0–35)
BILIRUB SERPL-MCNC: 0.5 MG/DL (ref 0.2–0.7)
BUN SERPL-MCNC: 11 MG/DL (ref 6–20)
CALCIUM SERPL-MCNC: 9.7 MG/DL (ref 8.5–9.9)
CHLORIDE SERPL-SCNC: 105 MEQ/L (ref 95–107)
CHOLEST SERPL-MCNC: 166 MG/DL (ref 0–199)
CO2 SERPL-SCNC: 30 MEQ/L (ref 20–31)
CREAT SERPL-MCNC: 0.92 MG/DL (ref 0.5–0.9)
ERYTHROCYTE [DISTWIDTH] IN BLOOD BY AUTOMATED COUNT: 13.7 % (ref 11.5–14.5)
GLOBULIN SER CALC-MCNC: 3 G/DL (ref 2.3–3.5)
GLUCOSE SERPL-MCNC: 89 MG/DL (ref 70–99)
HCT VFR BLD AUTO: 42.6 % (ref 37–47)
HDLC SERPL-MCNC: 52 MG/DL (ref 40–59)
HGB BLD-MCNC: 14.1 G/DL (ref 12–16)
LDL CHOLESTEROL: 86 MG/DL (ref 0–129)
MCH RBC QN AUTO: 30.9 PG (ref 27–31.3)
MCHC RBC AUTO-ENTMCNC: 33.1 % (ref 33–37)
MCV RBC AUTO: 93.2 FL (ref 79.4–94.8)
PLATELET # BLD AUTO: 220 K/UL (ref 130–400)
POTASSIUM SERPL-SCNC: 4.8 MEQ/L (ref 3.4–4.9)
PROT SERPL-MCNC: 7 G/DL (ref 6.3–8)
RBC # BLD AUTO: 4.57 M/UL (ref 4.2–5.4)
SODIUM SERPL-SCNC: 143 MEQ/L (ref 135–144)
TRIGLYCERIDE, FASTING: 140 MG/DL (ref 0–150)
WBC # BLD AUTO: 8.7 K/UL (ref 4.8–10.8)

## 2024-11-05 PROCEDURE — 3078F DIAST BP <80 MM HG: CPT | Performed by: INTERNAL MEDICINE

## 2024-11-05 PROCEDURE — 99214 OFFICE O/P EST MOD 30 MIN: CPT | Performed by: INTERNAL MEDICINE

## 2024-11-05 PROCEDURE — 3077F SYST BP >= 140 MM HG: CPT | Performed by: INTERNAL MEDICINE

## 2024-11-05 ASSESSMENT — SLEEP AND FATIGUE QUESTIONNAIRES
HOW LIKELY ARE YOU TO NOD OFF OR FALL ASLEEP WHILE SITTING INACTIVE IN A PUBLIC PLACE: WOULD NEVER DOZE
HOW LIKELY ARE YOU TO NOD OFF OR FALL ASLEEP IN A CAR, WHILE STOPPED FOR A FEW MINUTES IN TRAFFIC: WOULD NEVER DOZE
ESS TOTAL SCORE: 2
HOW LIKELY ARE YOU TO NOD OFF OR FALL ASLEEP WHILE SITTING QUIETLY AFTER LUNCH WITHOUT ALCOHOL: WOULD NEVER DOZE
HOW LIKELY ARE YOU TO NOD OFF OR FALL ASLEEP WHILE SITTING AND TALKING TO SOMEONE: WOULD NEVER DOZE
HOW LIKELY ARE YOU TO NOD OFF OR FALL ASLEEP WHILE SITTING AND READING: WOULD NEVER DOZE
HOW LIKELY ARE YOU TO NOD OFF OR FALL ASLEEP WHILE WATCHING TV: WOULD NEVER DOZE
HOW LIKELY ARE YOU TO NOD OFF OR FALL ASLEEP WHEN YOU ARE A PASSENGER IN A CAR FOR AN HOUR WITHOUT A BREAK: WOULD NEVER DOZE
HOW LIKELY ARE YOU TO NOD OFF OR FALL ASLEEP WHILE LYING DOWN TO REST IN THE AFTERNOON WHEN CIRCUMSTANCES PERMIT: MODERATE CHANCE OF DOZING

## 2024-11-05 NOTE — PROGRESS NOTES
morning 90 tablet 1    potassium chloride (KLOR-CON M) 10 MEQ extended release tablet TAKE TWO TABLETS BY MOUTH DAILY AT 9AM 180 tablet 5    furosemide (LASIX) 40 MG tablet TAKE ONE TABLET BY MOUTH DAILY AT 9AM 60 tablet 5    Diclofenac Sodium 100 MG TB24 Take 100 mg by mouth 2 times daily as needed (joint pain) 60 tablet 2    albuterol sulfate HFA (PROVENTIL;VENTOLIN;PROAIR) 108 (90 Base) MCG/ACT inhaler INHALE TWO PUFFS BY MOUTH TWICE DAILY 13.4 g 3    ipratropium 0.5 mg-albuterol 2.5 mg (DUONEB) 0.5-2.5 (3) MG/3ML SOLN nebulizer solution Take 3 mLs by nebulization every 4 hours as needed for Shortness of Breath 360 mL 11    nystatin (MYCOSTATIN) 009590 UNIT/GM powder Apply 3 times daily. 60 g 2    ibuprofen (ADVIL;MOTRIN) 800 MG tablet Take 1 tablet by mouth every 8 hours as needed for Pain 60 tablet 2    carvedilol (COREG) 3.125 MG tablet TAKE 1 TABLET BY MOUTH TWICE A  tablet 3    oxyBUTYnin (DITROPAN-XL) 5 MG extended release tablet TAKE ONE TABLET BY MOUTH DAILY AT 9AM 90 tablet 11    Varenicline Tartrate, Starter, 0.5 MG X 11 & 1 MG X 42 TBPK Use as directed 1 each 0    OXYGEN Patient to use for chronic hypoxia, wear nasal cannula at setting of 2L daytime and 3L at night. 1 each 5    aspirin EC 81 MG EC tablet Take 1 tablet by mouth in the morning. 90 tablet 3    Handicap Placard MISC by Does not apply route Good for 5 years.  Expires 3/27/2026. 1 each 0     No current facility-administered medications for this visit.       Objective:     There were no vitals filed for this visit.        Physical Exam  Constitutional:       General: She is not in acute distress.     Appearance: She is well-developed. She is not diaphoretic.   HENT:      Head: Normocephalic and atraumatic.   Eyes:      Conjunctiva/sclera: Conjunctivae normal.      Pupils: Pupils are equal, round, and reactive to light.   Cardiovascular:      Rate and Rhythm: Normal rate and regular rhythm.      Heart sounds: No murmur heard.     No

## 2024-11-11 DIAGNOSIS — R73.09 ABNORMAL BLOOD SUGAR: ICD-10-CM

## 2024-11-11 DIAGNOSIS — E88.810 DYSMETABOLIC SYNDROME: ICD-10-CM

## 2024-11-12 ENCOUNTER — OFFICE VISIT (OUTPATIENT)
Dept: INTERVENTIONAL RADIOLOGY/VASCULAR | Age: 59
End: 2024-11-12
Payer: MEDICARE

## 2024-11-12 VITALS
RESPIRATION RATE: 16 BRPM | HEIGHT: 65 IN | WEIGHT: 293 LBS | DIASTOLIC BLOOD PRESSURE: 89 MMHG | BODY MASS INDEX: 48.82 KG/M2 | SYSTOLIC BLOOD PRESSURE: 157 MMHG | OXYGEN SATURATION: 90 % | HEART RATE: 76 BPM

## 2024-11-12 DIAGNOSIS — I89.0 LYMPHEDEMA: ICD-10-CM

## 2024-11-12 DIAGNOSIS — I87.2 CHRONIC VENOUS INSUFFICIENCY: ICD-10-CM

## 2024-11-12 DIAGNOSIS — R20.0 NUMBNESS AND TINGLING OF BOTH LOWER EXTREMITIES: ICD-10-CM

## 2024-11-12 DIAGNOSIS — R20.2 NUMBNESS AND TINGLING OF BOTH LOWER EXTREMITIES: ICD-10-CM

## 2024-11-12 DIAGNOSIS — R09.89 OTHER SPECIFIED SYMPTOMS AND SIGNS INVOLVING THE CIRCULATORY AND RESPIRATORY SYSTEMS: ICD-10-CM

## 2024-11-12 DIAGNOSIS — I87.8 VENOUS STASIS: Primary | ICD-10-CM

## 2024-11-12 PROCEDURE — 3077F SYST BP >= 140 MM HG: CPT | Performed by: NURSE PRACTITIONER

## 2024-11-12 PROCEDURE — 3079F DIAST BP 80-89 MM HG: CPT | Performed by: NURSE PRACTITIONER

## 2024-11-12 PROCEDURE — 99204 OFFICE O/P NEW MOD 45 MIN: CPT | Performed by: NURSE PRACTITIONER

## 2024-11-12 NOTE — PROGRESS NOTES
Vascular Medicine and Interventional Radiology:    Yenny Negron, a female of 59 y.o. came to the office 11/12/2024.     Chief Complaint   Patient presents with    New Patient     Venous stasis and lymphedema     HPI: Yenny Negron referred by Zoie Ervin PA-C for evaluation of venous stasis and lymphedema.  Patient presents with symptoms of leg swelling, erythema to the calves, intermittent leg tenderness, and skin dryness. Patient states she has had these symptoms for years. States that she has been treated several times in the past at the hospital for leg swelling. Also notes she had an open wound 3 years ago to her calf. Denies open wounds or leg swelling currently.   Also endorses intermittent numbness to the left lateral thigh and a feeling of \"sand\" on the bottom of her feet, is concerned these symptoms are related to neuropathy. Denies claudication-like symptoms. Endorses chronic right hip pain and knee pain that makes walking difficult.   Patient also concerned about a \"water blister\" to the back of her left calf and wonders if it is related to venous insuff.   Denies chest pain. Denies dyspnea.     Patient takes ibuprofen prn for pain in legs. Does not take baby aspirin or other blood thinners.    Has not done conservative therapy with daily consistent wearing of class two compression stockings for at least 6 weeks.       Past medical history includes COPD and congestive heart failure, CAD, T2DM  Denies stents or kidney conditions  She is a current smoker - 1 PPD for 43 years.    Risk factors: Age, female sex, family history of venous disease (mother with varicose veins), increased BMI, smoking.    Family History   Problem Relation Age of Onset    Alzheimer's Disease Father     High Blood Pressure Father     Stroke Father         11 strokes in one year    Asthma Sister     Asthma Brother     No Known Problems Daughter     No Known Problems Daughter        Past Surgical History:   Procedure Laterality

## 2024-11-14 RX ORDER — SEMAGLUTIDE 0.68 MG/ML
0.25 INJECTION, SOLUTION SUBCUTANEOUS WEEKLY
Qty: 3 ML | Refills: 0 | Status: SHIPPED | OUTPATIENT
Start: 2024-11-14

## 2024-11-15 DIAGNOSIS — R60.0 EDEMA OF BOTH LEGS: ICD-10-CM

## 2024-11-15 RX ORDER — POTASSIUM CHLORIDE 750 MG/1
TABLET, EXTENDED RELEASE ORAL
Qty: 180 TABLET | Refills: 3 | Status: SHIPPED | OUTPATIENT
Start: 2024-11-15

## 2024-11-15 NOTE — TELEPHONE ENCOUNTER
Comments:     Last Office Visit (last PCP visit):   10/9/2024    Next Visit Date:  Future Appointments   Date Time Provider Department Center   11/21/2024 12:00 PM [facility] ROOM 1 [facility]   11/21/2024  1:00 PM [place] [facility] 2 [facility]   11/26/2024  2:15 PM Dirk Gonzalez MD [facility]   12/4/2024 10:00 AM DR CHRISTINE  [facility]   12/11/2024 11:30 AM Mandy German APRN - CNP [facility]   1/9/2025  1:15 PM Zoie Ervin, [facility]         Rx requested:  Requested Prescriptions     Pending Prescriptions Disp Refills    potassium chloride (KLOR-CON M) 10 MEQ extended release tablet [Pharmacy Med Name: potassium chloride ER 10 mEq tablet,extended release(part/cryst)] 180 tablet 3     Sig: TAKE TWO TABLETS BY MOUTH DAILY AT 9AM

## 2024-11-18 DIAGNOSIS — F17.200 SMOKING: ICD-10-CM

## 2024-11-18 RX ORDER — VARENICLINE TARTRATE 0.5 (11)-1
KIT ORAL
Qty: 53 EACH | Refills: 0 | Status: SHIPPED | OUTPATIENT
Start: 2024-11-18

## 2024-11-18 NOTE — TELEPHONE ENCOUNTER
Comments: last refill 8/26/24    Last Office Visit (last PCP visit):   10/9/2024    Next Visit Date:  Future Appointments   Date Time Provider Department Center   11/21/2024 12:00 PM [facility] ROOM 1 [facility]   11/21/2024  1:00 PM [place] [facility] ROOM 2 [facility] CT [facility] Fac RAD   11/26/2024  2:15 PM Dirk Gonzalez MD [facility]   12/4/2024 10:00 AM DR CHRISTINE  [facility]   12/11/2024 11:30 AM Mandy German, APRN - CNP [facility]   1/9/2025  1:15 PM Zoie Ervin PA-C [facility] DEP       **If hasn't been seen in over a year OR hasn't followed up according to last diabetes/ADHD visit, make appointment for patient before sending refill to provider.    Rx requested:  Requested Prescriptions     Pending Prescriptions Disp Refills    Varenicline Tartrate, Starter, 0.5 MG X 11 & 1 MG X 42 TBPK [Pharmacy Med Name: VARENICLINE STARTING MONTH BOX] 53 each      Sig: TAKE AS DIRECTED IN PACKAGING               
Patient information on fall and injury prevention

## 2024-12-18 DIAGNOSIS — J30.2 SEASONAL ALLERGIES: ICD-10-CM

## 2024-12-18 DIAGNOSIS — R21 RASH AND NONSPECIFIC SKIN ERUPTION: ICD-10-CM

## 2024-12-18 NOTE — TELEPHONE ENCOUNTER
Comments:     Last Office Visit (last PCP visit):   10/9/2024    Next Visit Date:  Future Appointments   Date Time Provider Department Center   1/9/2025  1:15 PM Zoie Ervin PA-C [facility] ECC DEP       **If hasn't been seen in over a year OR hasn't followed up according to last diabetes/ADHD visit, make appointment for patient before sending refill to provider.    Rx requested:  Requested Prescriptions     Pending Prescriptions Disp Refills    fluticasone (FLONASE) 50 MCG/ACT nasal spray [Pharmacy Med Name: FLUTICASONE PROP 50 MCG SPRAY]  1     Sig: SPRAY 1 SPRAY INTO EACH NOSTRIL EVERY DAY    KLAYESTA [id number] UNIT/GM powder [Pharmacy Med Name: KLAYESTA 100,000 UNIT/GM POWD] 60 g 2     Sig: APPLY TO AFFECTED AREA 3 TIMES A DAY

## 2024-12-19 DIAGNOSIS — R73.09 ABNORMAL BLOOD SUGAR: ICD-10-CM

## 2024-12-19 DIAGNOSIS — E88.810 DYSMETABOLIC SYNDROME: ICD-10-CM

## 2024-12-19 DIAGNOSIS — N39.41 URGE INCONTINENCE OF URINE: ICD-10-CM

## 2024-12-19 RX ORDER — ATORVASTATIN CALCIUM 80 MG/1
80 TABLET, FILM COATED ORAL DAILY
Qty: 90 TABLET | Refills: 3 | Status: SHIPPED | OUTPATIENT
Start: 2024-12-19

## 2024-12-19 NOTE — TELEPHONE ENCOUNTER
Requesting medication refill. Please approve or deny this request.    Rx requested:  Requested Prescriptions     Pending Prescriptions Disp Refills    atorvastatin (LIPITOR) 80 MG tablet [Pharmacy Med Name: ATORVASTATIN 80 MG TABLET] 90 tablet 3     Sig: TAKE 1 TABLET BY MOUTH EVERY DAY         Last Office Visit:   4/26/2024      Next Visit Date:  Future Appointments   Date Time Provider Department Center   1/9/2025  1:15 PM [facility]               Please approve or deny.

## 2024-12-19 NOTE — TELEPHONE ENCOUNTER
Pt called bc she would like to see if PCP can up her ditropan from 5mg to 10mg bc it doesn't seem to be helping now that it is decreased. She would also like to see if the ozempic can be called into her pharmacy. Bc it was supposed to be, but never was in November. Please advise.

## 2024-12-20 RX ORDER — OXYBUTYNIN CHLORIDE 10 MG/1
TABLET, EXTENDED RELEASE ORAL
Qty: 90 TABLET | Refills: 2 | Status: SHIPPED | OUTPATIENT
Start: 2024-12-20

## 2024-12-20 RX ORDER — NYSTATIN TOPICAL POWDER 100000 U/G
POWDER TOPICAL
Qty: 60 G | Refills: 2 | Status: SHIPPED | OUTPATIENT
Start: 2024-12-20

## 2024-12-20 RX ORDER — SEMAGLUTIDE 0.68 MG/ML
0.25 INJECTION, SOLUTION SUBCUTANEOUS WEEKLY
Qty: 3 ML | Refills: 0 | Status: SHIPPED | OUTPATIENT
Start: 2024-12-20

## 2024-12-20 RX ORDER — FLUTICASONE PROPIONATE 50 MCG
SPRAY, SUSPENSION (ML) NASAL
Qty: 16 G | Refills: 1 | Status: SHIPPED | OUTPATIENT
Start: 2024-12-20

## 2024-12-23 DIAGNOSIS — F17.200 SMOKING: ICD-10-CM

## 2024-12-23 NOTE — TELEPHONE ENCOUNTER
Last Office Visit (last PCP visit):   10/9/2024    Next Visit Date:  Future Appointments   Date Time Provider Department Center   1/9/2025  1:15 PM Zoie Ervin, CYN [facility] ECC DEP       **If hasn't been seen in over a year OR hasn't followed up according to last diabetes/ADHD visit, make appointment for patient before sending refill to provider.    Rx requested:  Requested Prescriptions     Pending Prescriptions Disp Refills    Varenicline Tartrate, Starter, 0.5 MG X 11 & 1 MG X 42 TBPK [Pharmacy Med Name: VARENICLINE STARTING MONTH BOX] 53 each 0     Sig: TAKE AS DIRECTED IN PACKAGING

## 2024-12-24 RX ORDER — VARENICLINE TARTRATE 0.5 (11)-1
KIT ORAL
Qty: 53 EACH | Refills: 0 | Status: SHIPPED | OUTPATIENT
Start: 2024-12-24

## 2025-01-06 DIAGNOSIS — F17.200 SMOKING: ICD-10-CM

## 2025-01-06 RX ORDER — VARENICLINE TARTRATE 0.5 (11)-1
KIT ORAL
Qty: 53 EACH | Refills: 0 | OUTPATIENT
Start: 2025-01-06

## 2025-01-09 ENCOUNTER — OFFICE VISIT (OUTPATIENT)
Age: 60
End: 2025-01-09

## 2025-01-09 VITALS
BODY MASS INDEX: 48.82 KG/M2 | HEIGHT: 65 IN | OXYGEN SATURATION: 94 % | RESPIRATION RATE: 16 BRPM | DIASTOLIC BLOOD PRESSURE: 90 MMHG | HEART RATE: 77 BPM | WEIGHT: 293 LBS | SYSTOLIC BLOOD PRESSURE: 132 MMHG

## 2025-01-09 DIAGNOSIS — Z87.891 PERSONAL HISTORY OF TOBACCO USE: ICD-10-CM

## 2025-01-09 DIAGNOSIS — Z00.00 MEDICARE ANNUAL WELLNESS VISIT, SUBSEQUENT: Primary | ICD-10-CM

## 2025-01-09 DIAGNOSIS — J42 CHRONIC BRONCHITIS, UNSPECIFIED CHRONIC BRONCHITIS TYPE (HCC): ICD-10-CM

## 2025-01-09 DIAGNOSIS — M25.551 RIGHT HIP PAIN: ICD-10-CM

## 2025-01-09 RX ORDER — ALBUTEROL SULFATE 90 UG/1
2 POWDER, METERED RESPIRATORY (INHALATION) EVERY 4 HOURS PRN
Qty: 2 EACH | Refills: 5 | Status: SHIPPED | OUTPATIENT
Start: 2025-01-09

## 2025-01-09 SDOH — ECONOMIC STABILITY: FOOD INSECURITY: WITHIN THE PAST 12 MONTHS, YOU WORRIED THAT YOUR FOOD WOULD RUN OUT BEFORE YOU GOT MONEY TO BUY MORE.: NEVER TRUE

## 2025-01-09 SDOH — ECONOMIC STABILITY: FOOD INSECURITY: WITHIN THE PAST 12 MONTHS, THE FOOD YOU BOUGHT JUST DIDN'T LAST AND YOU DIDN'T HAVE MONEY TO GET MORE.: NEVER TRUE

## 2025-01-09 ASSESSMENT — PATIENT HEALTH QUESTIONNAIRE - PHQ9
SUM OF ALL RESPONSES TO PHQ QUESTIONS 1-9: 0
SUM OF ALL RESPONSES TO PHQ9 QUESTIONS 1 & 2: 0
SUM OF ALL RESPONSES TO PHQ QUESTIONS 1-9: 0
SUM OF ALL RESPONSES TO PHQ QUESTIONS 1-9: 0
1. LITTLE INTEREST OR PLEASURE IN DOING THINGS: NOT AT ALL
2. FEELING DOWN, DEPRESSED OR HOPELESS: NOT AT ALL
SUM OF ALL RESPONSES TO PHQ QUESTIONS 1-9: 0

## 2025-01-09 NOTE — PATIENT INSTRUCTIONS
having a problem from this test. If dilating drops are used for a vision test, they may make the eyes sting and cause a medicine taste in the mouth.  Follow-up care is a key part of your treatment and safety. Be sure to make and go to all appointments, and call your doctor if you are having problems. It's also a good idea to know your test results and keep a list of the medicines you take.  Where can you learn more?  Go to https://www.[employer].net/patientEd and enter G551 to learn more about \"Learning About Vision Tests.\"  Current as of: July 31, 2024  Content Version: 14.3  © 2024 [employer].   Care instructions adapted under license by [employer]. If you have questions about a medical condition or this instruction, always ask your healthcare professional. [employer], disclaims any warranty or liability for your use of this information.         Learning About Activities of Daily Living  What are activities of daily living?     Activities of daily living (ADLs) are the basic self-care tasks you do every day. These include eating, bathing, dressing, and moving around.  As you age, and if you have health problems, you may find that it's harder to do some of these tasks. If so, your doctor can suggest ideas that may help.  To measure what kind of help you may need, your doctor will ask how well you are able to do ADLs. Let your doctor know if there are any tasks that you are having trouble doing. This is an important first step to getting help. And when you have the help you need, you can stay as independent as possible.  How will a doctor assess your ADLs?  Asking about ADLs is part of a routine health checkup your doctor will likely do as you age. Your health check might be done in a doctor's office, in your home, or at a hospital. The goal is to find out if you are having any problems that could make it hard to care for yourself or that make it unsafe for you to be on your own.  To

## 2025-01-09 NOTE — PROGRESS NOTES
Medicare Annual Wellness Visit    Yenny Negron is here for Medicare AWV    Assessment & Plan   Medicare annual wellness visit, subsequent  Personal history of tobacco use  -     WI VISIT TO DISCUSS LUNG CA SCREEN W LDCT  Chronic bronchitis, unspecified chronic bronchitis type (HCC)  -     albuterol sulfate (PROAIR RESPICLICK) 108 (90 Base) MCG/ACT aerosol powder inhalation; Inhale 2 puffs into the lungs every 4 hours as needed for Wheezing or Shortness of Breath, Disp-2 each, R-5Normal  Right hip pain  -     XR HIP RIGHT (2-3 VIEWS); Future     Samples of semaglutide provided for patient.   Xray of R hip.  Doesn't want to see ortho at this time; needs to lose weight.     Return in about 4 months (around 5/9/2025) for follow up in office.     Subjective   The following acute and/or chronic problems were also addressed today:    Remains complaint with her medications.    Labs are stable.     Increased R hip pain.  Denies injury/fall.  Chronic bilateral knee pain.  Has tried ibuprofen and toradol, no relief.    She cannot go up stairs due to knee pain.   R hip hurts worse if she is sitting and R hip is abducted.        Patient's complete Health Risk Assessment and screening values have been reviewed and are found in Flowsheets. The following problems were reviewed today and where indicated follow up appointments were made and/or referrals ordered.    Positive Risk Factor Screenings with Interventions:             General HRA Questions:  Select all that apply: (!) New or Increased Pain  Interventions - Pain:  Related to chronic knee and hip pain.      Inactivity:  On average, how many days per week do you engage in moderate to strenuous exercise (like a brisk walk)?: 0 days (!) Abnormal  On average, how many minutes do you engage in exercise at this level?: 0 min  Interventions:  Encouraged light activity.      Abnormal BMI (obese):  Body mass index is 55.25 kg/m². (!) Abnormal  Interventions:  Samples of semaglutide

## 2025-01-13 ENCOUNTER — TELEPHONE (OUTPATIENT)
Age: 60
End: 2025-01-13

## 2025-01-13 ENCOUNTER — TELEPHONE (OUTPATIENT)
Dept: GASTROENTEROLOGY | Age: 60
End: 2025-01-13

## 2025-01-13 RX ORDER — ALBUTEROL SULFATE 90 UG/1
2 INHALANT RESPIRATORY (INHALATION) 2 TIMES DAILY
Qty: 36 G | Refills: 3 | Status: SHIPPED | OUTPATIENT
Start: 2025-01-13

## 2025-01-13 NOTE — TELEPHONE ENCOUNTER
Incoming fax from [facility] pharmacy insurance will not pay for pro air respiclick, they do pay for albuterol HFA

## 2025-01-13 NOTE — TELEPHONE ENCOUNTER
Called [facility] scheduling LMOM.Please call [phone] ext [phone] schedule colon screening .

## 2025-01-22 DIAGNOSIS — F17.200 SMOKING: ICD-10-CM

## 2025-01-22 NOTE — TELEPHONE ENCOUNTER
Comments:     Last Office Visit (last PCP visit):   1/9/2025    Next Visit Date:  Future Appointments   Date Time Provider Department Center   5/9/2025  1:15 PM Zoie Ervin PA-C [facility] ECC DEP       **If hasn't been seen in over a year OR hasn't followed up according to last diabetes/ADHD visit, make appointment for patient before sending refill to provider.    Rx requested:  Requested Prescriptions     Pending Prescriptions Disp Refills    Varenicline Tartrate, Starter, 0.5 MG X 11 & 1 MG X 42 TBPK [Pharmacy Med Name: VARENICLINE STARTING MONTH BOX] 53 each 0     Sig: TAKE AS DIRECTED IN PACKAGING

## 2025-01-23 RX ORDER — VARENICLINE TARTRATE 0.5 (11)-1
KIT ORAL
Qty: 53 EACH | Refills: 0 | Status: SHIPPED | OUTPATIENT
Start: 2025-01-23

## 2025-02-17 DIAGNOSIS — F17.200 SMOKING: ICD-10-CM

## 2025-02-17 NOTE — TELEPHONE ENCOUNTER
Patient states her insurance will not pay for pro air respiclick. She is asking if Zoie will be sending an alternate medication?    Patient is also asking for samples of ozempic or wegovy, she would prefer wegovy because she tried ozempic and it did not really work.    Please Advise    Callback [phone]

## 2025-02-17 NOTE — TELEPHONE ENCOUNTER
Comments:    Last Office Visit (last PCP visit):   1/9/2025    Next Visit Date:  Future Appointments   Date Time Provider Department Center   5/9/2025  1:15 PM [facility]       **If hasn't been seen in over a year OR hasn't followed up according to last diabetes/ADHD visit, make appointment for patient before sending refill to provider.    Rx requested:  Requested Prescriptions     Pending Prescriptions Disp Refills    buPROPion (WELLBUTRIN XL) 150 MG extended release tablet [Pharmacy Med Name: BUPROPION HCL  MG TABLET] 90 tablet 1     Sig: TAKE 1 TABLET BY MOUTH EVERY MORNING    Varenicline Tartrate, Starter, 0.5 MG X 11 & 1 MG X 42 TBPK [Pharmacy Med Name: VARENICLINE STARTING MONTH BOX] 53 each 0     Sig: TAKE AS DIRECTED IN PACKAGING    ibuprofen (ADVIL;MOTRIN) 800 MG tablet [Pharmacy Med Name: IBUPROFEN 800 MG TABLET] 60 tablet 2     Sig: TAKE 1 TABLET BY MOUTH EVERY 8 HOURS AS NEEDED FOR PAIN

## 2025-02-18 RX ORDER — VARENICLINE TARTRATE 0.5 (11)-1
KIT ORAL
Qty: 53 EACH | Refills: 0 | Status: SHIPPED | OUTPATIENT
Start: 2025-02-18

## 2025-02-18 RX ORDER — IBUPROFEN 800 MG/1
800 TABLET, FILM COATED ORAL EVERY 8 HOURS PRN
Qty: 60 TABLET | Refills: 2 | Status: SHIPPED | OUTPATIENT
Start: 2025-02-18

## 2025-02-18 RX ORDER — BUPROPION HYDROCHLORIDE 150 MG/1
150 TABLET ORAL EVERY MORNING
Qty: 90 TABLET | Refills: 1 | Status: SHIPPED | OUTPATIENT
Start: 2025-02-18

## 2025-03-22 DIAGNOSIS — F17.200 SMOKING: ICD-10-CM

## 2025-03-23 RX ORDER — VARENICLINE TARTRATE 0.5 (11)-1
KIT ORAL
Qty: 53 EACH | Refills: 0 | Status: SHIPPED | OUTPATIENT
Start: 2025-03-23

## 2025-04-13 DIAGNOSIS — J30.2 SEASONAL ALLERGIES: ICD-10-CM

## 2025-04-14 DIAGNOSIS — I25.10 CORONARY ARTERY DISEASE INVOLVING NATIVE CORONARY ARTERY OF NATIVE HEART, UNSPECIFIED WHETHER ANGINA PRESENT: ICD-10-CM

## 2025-04-14 DIAGNOSIS — I10 ESSENTIAL (PRIMARY) HYPERTENSION: ICD-10-CM

## 2025-04-14 DIAGNOSIS — J44.9 CHRONIC OBSTRUCTIVE PULMONARY DISEASE, UNSPECIFIED COPD TYPE (HCC): ICD-10-CM

## 2025-04-14 DIAGNOSIS — N39.41 URGE INCONTINENCE OF URINE: ICD-10-CM

## 2025-04-14 DIAGNOSIS — M17.0 PRIMARY OSTEOARTHRITIS OF BOTH KNEES: ICD-10-CM

## 2025-04-14 DIAGNOSIS — M25.50 ARTHRALGIA, UNSPECIFIED JOINT: ICD-10-CM

## 2025-04-14 DIAGNOSIS — R60.0 EDEMA OF BOTH LEGS: ICD-10-CM

## 2025-04-14 DIAGNOSIS — R21 RASH AND NONSPECIFIC SKIN ERUPTION: ICD-10-CM

## 2025-04-14 DIAGNOSIS — J42 CHRONIC BRONCHITIS, UNSPECIFIED CHRONIC BRONCHITIS TYPE (HCC): ICD-10-CM

## 2025-04-14 DIAGNOSIS — N39.45 CONTINUOUS LEAKAGE OF URINE: ICD-10-CM

## 2025-04-14 DIAGNOSIS — E88.810 DYSMETABOLIC SYNDROME: ICD-10-CM

## 2025-04-14 RX ORDER — DICLOFENAC SODIUM 100 MG/1
100 TABLET, EXTENDED RELEASE ORAL 2 TIMES DAILY PRN
Qty: 60 TABLET | Refills: 2 | Status: SHIPPED | OUTPATIENT
Start: 2025-04-14

## 2025-04-14 RX ORDER — FLUTICASONE PROPIONATE 50 MCG
SPRAY, SUSPENSION (ML) NASAL
Qty: 24 ML | Refills: 2 | Status: SHIPPED | OUTPATIENT
Start: 2025-04-14

## 2025-04-14 RX ORDER — CARVEDILOL 3.12 MG/1
TABLET ORAL
Qty: 180 TABLET | Refills: 3 | Status: SHIPPED | OUTPATIENT
Start: 2025-04-14

## 2025-04-14 RX ORDER — ATORVASTATIN CALCIUM 80 MG/1
80 TABLET, FILM COATED ORAL DAILY
Qty: 30 TABLET | Refills: 0 | Status: SHIPPED | OUTPATIENT
Start: 2025-04-14

## 2025-04-14 RX ORDER — FACIAL-BODY WIPES
EACH TOPICAL
Qty: 140 EACH | Refills: 11 | Status: SHIPPED | OUTPATIENT
Start: 2025-04-14

## 2025-04-14 RX ORDER — DIAPER,BRIEF,ADULT, DISPOSABLE
EACH MISCELLANEOUS
Qty: 26 EACH | Refills: 11 | Status: SHIPPED | OUTPATIENT
Start: 2025-04-14

## 2025-04-14 RX ORDER — OXYBUTYNIN CHLORIDE 10 MG/1
TABLET, EXTENDED RELEASE ORAL
Qty: 90 TABLET | Refills: 2 | Status: SHIPPED | OUTPATIENT
Start: 2025-04-14

## 2025-04-14 RX ORDER — BUPROPION HYDROCHLORIDE 150 MG/1
150 TABLET ORAL EVERY MORNING
Qty: 90 TABLET | Refills: 1 | Status: SHIPPED | OUTPATIENT
Start: 2025-04-14

## 2025-04-14 RX ORDER — ATORVASTATIN CALCIUM 40 MG/1
40 TABLET, FILM COATED ORAL NIGHTLY
Qty: 90 TABLET | Refills: 4 | Status: SHIPPED | OUTPATIENT
Start: 2025-04-14

## 2025-04-14 RX ORDER — IBUPROFEN 800 MG/1
800 TABLET, FILM COATED ORAL EVERY 8 HOURS PRN
Qty: 60 TABLET | Refills: 2 | Status: SHIPPED | OUTPATIENT
Start: 2025-04-14

## 2025-04-14 RX ORDER — ALBUTEROL SULFATE 90 UG/1
INHALANT RESPIRATORY (INHALATION)
Qty: 13.4 G | Refills: 3 | Status: SHIPPED | OUTPATIENT
Start: 2025-04-14

## 2025-04-14 RX ORDER — FUROSEMIDE 40 MG/1
TABLET ORAL
Qty: 60 TABLET | Refills: 5 | Status: SHIPPED | OUTPATIENT
Start: 2025-04-14

## 2025-04-14 RX ORDER — KETOROLAC TROMETHAMINE 10 MG/1
10 TABLET, FILM COATED ORAL EVERY 6 HOURS PRN
Qty: 20 TABLET | Refills: 0 | Status: SHIPPED | OUTPATIENT
Start: 2025-04-14 | End: 2026-04-14

## 2025-04-14 RX ORDER — ALBUTEROL SULFATE 90 UG/1
2 POWDER, METERED RESPIRATORY (INHALATION) EVERY 4 HOURS PRN
Qty: 2 EACH | Refills: 5 | Status: SHIPPED | OUTPATIENT
Start: 2025-04-14

## 2025-04-14 RX ORDER — ALBUTEROL SULFATE 90 UG/1
2 INHALANT RESPIRATORY (INHALATION) 2 TIMES DAILY
Qty: 36 G | Refills: 3 | Status: SHIPPED | OUTPATIENT
Start: 2025-04-14

## 2025-04-14 RX ORDER — NYSTATIN 100000 [USP'U]/G
POWDER TOPICAL
Qty: 60 G | Refills: 2 | Status: SHIPPED | OUTPATIENT
Start: 2025-04-14

## 2025-04-14 RX ORDER — POTASSIUM CHLORIDE 750 MG/1
TABLET, EXTENDED RELEASE ORAL
Qty: 180 TABLET | Refills: 3 | Status: SHIPPED | OUTPATIENT
Start: 2025-04-14

## 2025-04-14 RX ORDER — IPRATROPIUM BROMIDE AND ALBUTEROL SULFATE 2.5; .5 MG/3ML; MG/3ML
1 SOLUTION RESPIRATORY (INHALATION) EVERY 4 HOURS PRN
Qty: 360 ML | Refills: 11 | Status: SHIPPED | OUTPATIENT
Start: 2025-04-14

## 2025-04-14 NOTE — TELEPHONE ENCOUNTER
30 day refill given. Patient needs follow up with Dr. Granados.     Requesting medication refill. Please approve or deny this request.    Rx requested:  Requested Prescriptions     Pending Prescriptions Disp Refills    atorvastatin (LIPITOR) 80 MG tablet 90 tablet 3     Sig: Take 1 tablet by mouth daily         Last Office Visit:   4/26/2024      Next Visit Date:  Future Appointments   Date Time Provider Department Center   5/9/2025  1:15 PM Zoie Ervin PA-C [facility] ECC DEP

## 2025-04-14 NOTE — TELEPHONE ENCOUNTER
Comments:     Last Office Visit (last PCP visit):   1/9/2025    Next Visit Date:  Future Appointments   Date Time Provider Department Center   5/9/2025  1:15 PM Zoie Ervin PA-C [facility] ECC DEP       **If hasn't been seen in over a year OR hasn't followed up according to last diabetes/ADHD visit, make appointment for patient before sending refill to provider.    Rx requested:  Requested Prescriptions     Pending Prescriptions Disp Refills    furosemide (LASIX) 40 MG tablet 60 tablet 5     Sig: TAKE ONE TABLET BY MOUTH DAILY AT 9AM    Diclofenac Sodium 100 MG TB24 60 tablet 2     Sig: Take 100 mg by mouth 2 times daily as needed (joint pain)    albuterol sulfate HFA (PROVENTIL;VENTOLIN;PROAIR) 108 (90 Base) MCG/ACT inhaler 13.4 g 3     Sig: INHALE TWO PUFFS BY MOUTH TWICE DAILY    ipratropium 0.5 mg-albuterol 2.5 mg (DUONEB) 0.5-2.5 (3) MG/3ML SOLN nebulizer solution 360 mL 11     Sig: Take 3 mLs by nebulization every 4 hours as needed for Shortness of Breath    carvedilol (COREG) 3.125 MG tablet 180 tablet 3     Sig: TAKE 1 TABLET BY MOUTH TWICE A DAY    Incontinence Supply Disposable (MOIST WIPES FLUSHABLE) MISC 140 each 11     Sig: Use for urinary incontinence.    Nursing Pads/Disposable PADS 100 each 11     Sig: Use for urinary incontinence.    Incontinence Supply Disposable (DEPEND UNDERWEAR X-LARGE) MISC 26 each 11     Sig: Use for urinary incontinence.    atorvastatin (LIPITOR) 40 MG tablet 90 tablet 4     Sig: Take 1 tablet by mouth at bedtime    ketorolac (TORADOL) 10 MG tablet 20 tablet 0     Sig: Take 1 tablet by mouth every 6 hours as needed for Pain    potassium chloride (KLOR-CON M) 10 MEQ extended release tablet 180 tablet 3     Sig: TAKE TWO TABLETS BY MOUTH DAILY AT 9AM    nystatin (KLAYESTA) [id number] UNIT/GM powder 60 g 2     Sig: Apply topically 4 times daily.    oxyBUTYnin (DITROPAN-XL) 10 MG extended release tablet 90 tablet 2     Sig: TAKE ONE TABLET BY MOUTH DAILY AT 9AM    albuterol

## 2025-04-14 NOTE — TELEPHONE ENCOUNTER
Comments:     Last Office Visit (last PCP visit):   1/9/2025    Next Visit Date:  Future Appointments   Date Time Provider Department Center   5/9/2025  1:15 PM Zoie Ervin PA-C [facility] ECC DEP       **If hasn't been seen in over a year OR hasn't followed up according to last diabetes/ADHD visit, make appointment for patient before sending refill to provider.    Rx requested:  Requested Prescriptions     Pending Prescriptions Disp Refills    fluticasone (FLONASE) 50 MCG/ACT nasal spray [Pharmacy Med Name: FLUTICASONE PROP 50 MCG SPRAY] 24 mL 2     Sig: SPRAY 1 SPRAY INTO EACH NOSTRIL EVERY DAY

## 2025-05-09 ENCOUNTER — TELEPHONE (OUTPATIENT)
Age: 60
End: 2025-05-09

## 2025-06-02 ENCOUNTER — TELEPHONE (OUTPATIENT)
Age: 60
End: 2025-06-02

## 2025-06-02 NOTE — TELEPHONE ENCOUNTER
Pt called bc her CPAP machine stopped working Saturday and she cannot find her tubes for her oxygen. She called the place she got it from and she needs a new one bc it has been 5 years. She can't remember if she sees a pulmonologist or not and if so what their name is. She also needs to have oxygen refilled as well. [facility] has not been able to sleep bc of fear of her sleep apnea. Please call her and not do mychart bc she can't figure it out. Please advise.

## 2025-06-03 NOTE — TELEPHONE ENCOUNTER
It looks like she recently no showed an appointment?   This is something that can be sent to DME for her oxygen.   Sleep supplies will most likely need to go through me or pulmonology.

## 2025-06-19 ENCOUNTER — TELEPHONE (OUTPATIENT)
Age: 60
End: 2025-06-19

## 2025-06-19 ENCOUNTER — CARE COORDINATION (OUTPATIENT)
Dept: CARE COORDINATION | Age: 60
End: 2025-06-19

## 2025-06-19 NOTE — TELEPHONE ENCOUNTER
Received a call from Angy Selby stating patient needs an appointment with Zoie bueno because she has not taken her meds for a month and needs to be seen for depression appointment also needs to be after 11 a.m. I offered an appointment for the following week but the appointment was at 845 am and too early for the patient. I offered an appointment with other providers and was advised patient will only see Zoie. Angy asked if I could re-arrange Zoie's procedure appointments to fit her in. I advised we could only use the times available. Angy states she will message Zoie to see if she would re-arrange her schedule.

## 2025-06-19 NOTE — CARE COORDINATION
Ambulatory Care Coordination Note     2025 9:29 AM     Patient Current Location:  Home: [address] [place] [address] [place] [address]     This patient was received as a referral from Population health report .    ACM contacted the patient by telephone. Verified name and  with patient as identifiers. Provided introduction to self, and explanation of the ACM role.   Patient accepted care management services at this time.          ACM: Angy Selby RN     Challenges to be reviewed by the provider   Additional needs identified to be addressed with provider Yes  Needs appt                Method of communication with provider: called office they were not able to assist with appt unless different provider. Sent secure message to pcp with needs .    Utilization: Initial Call - N/A    Care Summary Note: ACM spoke to patient.  Introduced ACC program role of ACM goals and benefits.  ACM notes a few telephone encounters to PCP regarding her CPAP earlier in the month.  Patient states she has got her CPAP working and compliant every night and feeling much better sleeping and not drowsiness during the day.  Patient did admit that she has some mental health challenges of depression.  She does not have a mental health provider.  Patient admitted she has not taken any of her medications in a month.  Patient agreed to see PCP to discuss next actions.  Patient has agreed to additional engagement and we will follow-up next week after she has secured an appointment.  Patient in agreement.    Offered patient enrollment in the Remote Patient Monitoring (RPM) program for in-home monitoring: Deferred at this time because  ; will discuss at next outreach.     Assessments Completed:   No changes since last call    Medications Reviewed:   Not completed during this call: Per patient has not taken any medications in over a month    Advance Care Planning:   Not reviewed during this call     Care Planning:   Not completed during this

## 2025-06-23 ENCOUNTER — CARE COORDINATION (OUTPATIENT)
Dept: CARE COORDINATION | Age: 60
End: 2025-06-23

## 2025-06-23 SDOH — SOCIAL STABILITY: SOCIAL NETWORK
DO YOU BELONG TO ANY CLUBS OR ORGANIZATIONS SUCH AS CHURCH GROUPS, UNIONS, FRATERNAL OR ATHLETIC GROUPS, OR SCHOOL GROUPS?: NO

## 2025-06-23 SDOH — SOCIAL STABILITY: SOCIAL INSECURITY: WITHIN THE LAST YEAR, HAVE YOU BEEN HUMILIATED OR EMOTIONALLY ABUSED IN OTHER WAYS BY YOUR PARTNER OR EX-PARTNER?: NO

## 2025-06-23 SDOH — HEALTH STABILITY: MENTAL HEALTH: HOW OFTEN DO YOU HAVE A DRINK CONTAINING ALCOHOL?: NEVER

## 2025-06-23 SDOH — HEALTH STABILITY: PHYSICAL HEALTH: ON AVERAGE, HOW MANY DAYS PER WEEK DO YOU ENGAGE IN MODERATE TO STRENUOUS EXERCISE (LIKE A BRISK WALK)?: 0 DAYS

## 2025-06-23 SDOH — ECONOMIC STABILITY: HOUSING INSECURITY: IN THE LAST 12 MONTHS, WAS THERE A TIME WHEN YOU WERE NOT ABLE TO PAY THE MORTGAGE OR RENT ON TIME?: NO

## 2025-06-23 SDOH — HEALTH STABILITY: MENTAL HEALTH
DO YOU FEEL STRESS - TENSE, RESTLESS, NERVOUS, OR ANXIOUS, OR UNABLE TO SLEEP AT NIGHT BECAUSE YOUR MIND IS TROUBLED ALL THE TIME - THESE DAYS?: ONLY A LITTLE

## 2025-06-23 SDOH — ECONOMIC STABILITY: FOOD INSECURITY: HOW HARD IS IT FOR YOU TO PAY FOR THE VERY BASICS LIKE FOOD, HOUSING, MEDICAL CARE, AND HEATING?: NOT HARD AT ALL

## 2025-06-23 SDOH — ECONOMIC STABILITY: FOOD INSECURITY: WITHIN THE PAST 12 MONTHS, YOU WORRIED THAT YOUR FOOD WOULD RUN OUT BEFORE YOU GOT THE MONEY TO BUY MORE.: NEVER TRUE

## 2025-06-23 SDOH — SOCIAL STABILITY: SOCIAL NETWORK: HOW OFTEN DO YOU ATTEND MEETINGS OF THE CLUBS OR ORGANIZATIONS YOU BELONG TO?: NEVER

## 2025-06-23 SDOH — SOCIAL STABILITY: SOCIAL NETWORK
IN A TYPICAL WEEK, HOW MANY TIMES DO YOU TALK ON THE PHONE WITH FAMILY, FRIENDS, OR NEIGHBORS?: MORE THAN THREE TIMES A WEEK

## 2025-06-23 SDOH — ECONOMIC STABILITY: FOOD INSECURITY: WITHIN THE PAST 12 MONTHS, THE FOOD YOU BOUGHT JUST DIDN'T LAST AND YOU DIDN'T HAVE MONEY TO GET MORE.: NEVER TRUE

## 2025-06-23 SDOH — SOCIAL STABILITY: SOCIAL NETWORK: HOW OFTEN DO YOU ATTEND CHURCH OR RELIGIOUS SERVICES?: NEVER

## 2025-06-23 SDOH — HEALTH STABILITY: MENTAL HEALTH: HOW MANY DRINKS CONTAINING ALCOHOL DO YOU HAVE ON A TYPICAL DAY WHEN YOU ARE DRINKING?: PATIENT DOES NOT DRINK

## 2025-06-23 SDOH — SOCIAL STABILITY: SOCIAL INSECURITY: WITHIN THE LAST YEAR, HAVE YOU BEEN AFRAID OF YOUR PARTNER OR EX-PARTNER?: NO

## 2025-06-23 SDOH — ECONOMIC STABILITY: TRANSPORTATION INSECURITY: IN THE PAST 12 MONTHS, HAS LACK OF TRANSPORTATION KEPT YOU FROM MEDICAL APPOINTMENTS OR FROM GETTING MEDICATIONS?: NO

## 2025-06-23 SDOH — SOCIAL STABILITY: SOCIAL INSECURITY
WITHIN THE LAST YEAR, HAVE YOU BEEN RAPED OR FORCED TO HAVE ANY KIND OF SEXUAL ACTIVITY BY YOUR PARTNER OR EX-PARTNER?: NO

## 2025-06-23 SDOH — SOCIAL STABILITY: SOCIAL INSECURITY: ARE YOU MARRIED, WIDOWED, DIVORCED, SEPARATED, NEVER MARRIED, OR LIVING WITH A PARTNER?: MARRIED

## 2025-06-23 SDOH — SOCIAL STABILITY: SOCIAL NETWORK: HOW OFTEN DO YOU GET TOGETHER WITH FRIENDS OR RELATIVES?: TWICE A WEEK

## 2025-06-23 SDOH — SOCIAL STABILITY: SOCIAL INSECURITY
WITHIN THE LAST YEAR, HAVE YOU BEEN KICKED, HIT, SLAPPED, OR OTHERWISE PHYSICALLY HURT BY YOUR PARTNER OR EX-PARTNER?: NO

## 2025-06-23 SDOH — HEALTH STABILITY: PHYSICAL HEALTH: ON AVERAGE, HOW MANY MINUTES DO YOU ENGAGE IN EXERCISE AT THIS LEVEL?: 0 MIN

## 2025-06-23 ASSESSMENT — ACTIVITIES OF DAILY LIVING (ADL): LACK_OF_TRANSPORTATION: NO

## 2025-06-23 NOTE — CARE COORDINATION
Ambulatory Care Coordination Note     2025 12:23 PM     Patient Current Location:  Home: [address] [place] [address] [address] [place] [address]     ACM contacted the patient by telephone. Verified name and  with patient as identifiers.         ACM: Angy Selby RN     Challenges to be reviewed by the provider   Additional needs identified to be addressed with provider No                  Method of communication with provider: none.    Utilization: Patient has not had any utilization since our last call.     Care Summary Note: Spoke with patient completed ACC enrollment .  Patient is aware new ACC outreach maybe with ACM co-worker     Offered patient enrollment in the Remote Patient Monitoring (RPM) program for in-home monitoring: Yes, but did not enroll at this time: declined .     Assessments Completed:       2025    12:18 PM   Amb Fall Risk Assessment and TUG Test   Do you feel unsteady or are you worried about falling?  no   2 or more falls in past year? no   Fall with injury in past year? no    ,   Ambulatory Care Coordination Assessment    Care Coordination Protocol  Referral from Primary Care Provider: No  Week 1 - Initial Assessment     Do you have all of your prescriptions and are they filled?: No  Barriers to medication adherence: Other, Complexity of regimen  Are you able to afford your medications?: With Assistance  Medication Assistance Program: Other  Other Assistance Program: medicaid  How often do you have trouble taking your medications the way you have been told to take them?: Sometimes I take them as prescribed.     Do you have Home O2 Therapy?: Yes   Oxygen Regimen: At night/Sleep, PRN Flow - Enter rate/FIO2: 2   Method of Delivery: Nasal Cannula, Concentrater   CPAP Use: CPAP      Ability to seek help/take action for Emergent Urgent situations i.e. fire, crime, inclement weather or health crisis.: Independent  Ability to ambulate to restroom: Independent  Ability handle personal hygeine needs

## 2025-06-27 ENCOUNTER — OFFICE VISIT (OUTPATIENT)
Age: 60
End: 2025-06-27

## 2025-06-27 VITALS
SYSTOLIC BLOOD PRESSURE: 130 MMHG | WEIGHT: 293 LBS | HEIGHT: 65 IN | RESPIRATION RATE: 16 BRPM | OXYGEN SATURATION: 92 % | DIASTOLIC BLOOD PRESSURE: 82 MMHG | BODY MASS INDEX: 48.82 KG/M2 | HEART RATE: 87 BPM

## 2025-06-27 DIAGNOSIS — R73.09 ABNORMAL BLOOD SUGAR: ICD-10-CM

## 2025-06-27 DIAGNOSIS — E55.9 VITAMIN D DEFICIENCY: ICD-10-CM

## 2025-06-27 DIAGNOSIS — I25.10 CORONARY ARTERY DISEASE INVOLVING NATIVE CORONARY ARTERY OF NATIVE HEART, UNSPECIFIED WHETHER ANGINA PRESENT: ICD-10-CM

## 2025-06-27 DIAGNOSIS — F33.1 MODERATE EPISODE OF RECURRENT MAJOR DEPRESSIVE DISORDER (HCC): Primary | ICD-10-CM

## 2025-06-27 DIAGNOSIS — J42 CHRONIC BRONCHITIS, UNSPECIFIED CHRONIC BRONCHITIS TYPE (HCC): ICD-10-CM

## 2025-06-27 DIAGNOSIS — E88.810 DYSMETABOLIC SYNDROME: ICD-10-CM

## 2025-06-27 DIAGNOSIS — J44.9 CHRONIC OBSTRUCTIVE PULMONARY DISEASE, UNSPECIFIED COPD TYPE (HCC): ICD-10-CM

## 2025-06-27 DIAGNOSIS — R21 RASH AND NONSPECIFIC SKIN ERUPTION: ICD-10-CM

## 2025-06-27 DIAGNOSIS — E78.2 MIXED HYPERLIPIDEMIA: ICD-10-CM

## 2025-06-27 DIAGNOSIS — I10 ESSENTIAL (PRIMARY) HYPERTENSION: ICD-10-CM

## 2025-06-27 DIAGNOSIS — M25.50 ARTHRALGIA, UNSPECIFIED JOINT: ICD-10-CM

## 2025-06-27 DIAGNOSIS — J30.2 SEASONAL ALLERGIES: ICD-10-CM

## 2025-06-27 DIAGNOSIS — I27.20 PULMONARY HYPERTENSION (HCC): ICD-10-CM

## 2025-06-27 DIAGNOSIS — E66.813 OBESITY, CLASS 3 (HCC): ICD-10-CM

## 2025-06-27 DIAGNOSIS — N39.41 URGE INCONTINENCE OF URINE: ICD-10-CM

## 2025-06-27 LAB
ALBUMIN SERPL-MCNC: 4.1 G/DL (ref 3.5–4.6)
ALP SERPL-CCNC: 125 U/L (ref 40–130)
ALT SERPL-CCNC: 16 U/L (ref 0–33)
ANION GAP SERPL CALCULATED.3IONS-SCNC: 13 MEQ/L (ref 9–15)
AST SERPL-CCNC: 15 U/L (ref 0–35)
BILIRUB SERPL-MCNC: 0.3 MG/DL (ref 0.2–0.7)
BUN SERPL-MCNC: 12 MG/DL (ref 6–20)
CALCIUM SERPL-MCNC: 9.1 MG/DL (ref 8.5–9.9)
CHLORIDE SERPL-SCNC: 100 MEQ/L (ref 95–107)
CHOLEST SERPL-MCNC: 250 MG/DL (ref 0–199)
CO2 SERPL-SCNC: 29 MEQ/L (ref 20–31)
CREAT SERPL-MCNC: 0.86 MG/DL (ref 0.5–0.9)
ERYTHROCYTE [DISTWIDTH] IN BLOOD BY AUTOMATED COUNT: 14.3 % (ref 11.5–14.5)
GLOBULIN SER CALC-MCNC: 2.8 G/DL (ref 2.3–3.5)
GLUCOSE SERPL-MCNC: 83 MG/DL (ref 70–99)
HCT VFR BLD AUTO: 47.9 % (ref 37–47)
HDLC SERPL-MCNC: 59 MG/DL (ref 40–59)
HGB BLD-MCNC: 14.9 G/DL (ref 12–16)
LDL CHOLESTEROL: 158 MG/DL (ref 0–129)
MCH RBC QN AUTO: 30.3 PG (ref 27–31.3)
MCHC RBC AUTO-ENTMCNC: 31.1 % (ref 33–37)
MCV RBC AUTO: 97.6 FL (ref 79.4–94.8)
PLATELET # BLD AUTO: 292 K/UL (ref 130–400)
POTASSIUM SERPL-SCNC: 5 MEQ/L (ref 3.4–4.9)
PROT SERPL-MCNC: 6.9 G/DL (ref 6.3–8)
RBC # BLD AUTO: 4.91 M/UL (ref 4.2–5.4)
SODIUM SERPL-SCNC: 142 MEQ/L (ref 135–144)
TRIGLYCERIDE, FASTING: 167 MG/DL (ref 0–150)
WBC # BLD AUTO: 8.9 K/UL (ref 4.8–10.8)

## 2025-06-27 RX ORDER — OXYBUTYNIN CHLORIDE 10 MG/1
TABLET, EXTENDED RELEASE ORAL
Qty: 90 TABLET | Refills: 2 | Status: SHIPPED | OUTPATIENT
Start: 2025-06-27

## 2025-06-27 RX ORDER — CARVEDILOL 3.12 MG/1
TABLET ORAL
Qty: 180 TABLET | Refills: 3 | Status: SHIPPED | OUTPATIENT
Start: 2025-06-27

## 2025-06-27 RX ORDER — ALBUTEROL SULFATE 90 UG/1
2 POWDER, METERED RESPIRATORY (INHALATION) EVERY 4 HOURS PRN
Qty: 2 EACH | Refills: 5 | Status: SHIPPED | OUTPATIENT
Start: 2025-06-27

## 2025-06-27 RX ORDER — VILAZODONE HYDROCHLORIDE 10 MG/1
10 TABLET ORAL DAILY
Qty: 30 TABLET | Refills: 5 | Status: SHIPPED | OUTPATIENT
Start: 2025-06-27

## 2025-06-27 RX ORDER — IPRATROPIUM BROMIDE AND ALBUTEROL SULFATE 2.5; .5 MG/3ML; MG/3ML
1 SOLUTION RESPIRATORY (INHALATION) EVERY 4 HOURS PRN
Qty: 360 ML | Refills: 11 | Status: SHIPPED | OUTPATIENT
Start: 2025-06-27

## 2025-06-27 RX ORDER — FLUTICASONE PROPIONATE 50 MCG
1 SPRAY, SUSPENSION (ML) NASAL DAILY
Qty: 24 ML | Refills: 2 | Status: SHIPPED | OUTPATIENT
Start: 2025-06-27

## 2025-06-27 RX ORDER — ATORVASTATIN CALCIUM 40 MG/1
40 TABLET, FILM COATED ORAL NIGHTLY
Qty: 90 TABLET | Refills: 4 | Status: SHIPPED | OUTPATIENT
Start: 2025-06-27

## 2025-06-27 RX ORDER — DULAGLUTIDE 0.75 MG/.5ML
0.75 INJECTION, SOLUTION SUBCUTANEOUS WEEKLY
Qty: 4 ADJUSTABLE DOSE PRE-FILLED PEN SYRINGE | Refills: 5 | Status: SHIPPED | OUTPATIENT
Start: 2025-06-27

## 2025-06-27 RX ORDER — DICLOFENAC SODIUM 100 MG/1
100 TABLET, EXTENDED RELEASE ORAL 2 TIMES DAILY PRN
Qty: 60 TABLET | Refills: 2 | Status: SHIPPED | OUTPATIENT
Start: 2025-06-27

## 2025-06-27 RX ORDER — ASPIRIN 81 MG/1
81 TABLET, COATED ORAL DAILY
Qty: 90 TABLET | Refills: 3 | Status: SHIPPED | OUTPATIENT
Start: 2025-06-27

## 2025-06-27 ASSESSMENT — ENCOUNTER SYMPTOMS
COLOR CHANGE: 1
COUGH: 0
BACK PAIN: 1
CHEST TIGHTNESS: 0
ABDOMINAL DISTENTION: 0
SINUS PRESSURE: 0
WHEEZING: 0
SINUS PAIN: 0
FACIAL SWELLING: 0
SHORTNESS OF BREATH: 0
ABDOMINAL PAIN: 0

## 2025-06-27 NOTE — PROGRESS NOTES
Subjective  Yenny Negron, 60 y.o. female presents today with:  Chief Complaint   Patient presents with    Follow-up     Discuss depression needs to restart medication        History of Present Illness  The patient presents for evaluation of depression, COPD, urinary incontinence, and diabetes.  She is requesting refills of all her medications.  Stopped all of her medications in April.   Related to her mood and worsening depression.    She reports experiencing depression since 04/2025, which has been exacerbated by her recent move on 04/01/2025. Despite making significant life changes, including purchasing a new home and furniture, she continues to struggle with her mental health. She is currently not on any medication and does not endorse any suicidal ideation. She is interested in restarting her depression medication.  She was previously on wellbutrin, would like to try something else.     She has a history of COPD and uses an albuterol inhaler for management. Persistent shortness of breath and wheezing are reported. She also has a nebulizer machine at home.     She has a history of urinary incontinence and has previously used oxybutynin for management. A bladder sling procedure was performed several years ago, which initially improved her symptoms. However, following partial removal of the sling, recurrent issues have been experienced. She also had a hysterectomy due to ovarian cysts. No signs of a urinary tract infection, such as burning or blood in the urine, are reported.    She was previously on Ozempic for diabetes management but discontinued it due to insurance issues. She is interested in trying Trulicity as an alternative.    Frequent sneezing and excessive watering of her eyes have been noticed, which she attributes to recent cataract surgery. She has been using the prescribed eye drops.     She is willing to go back on baby aspirin and Lipitor. Coreg is taken for her heart and blood pressure.

## 2025-06-28 LAB
ESTIMATED AVERAGE GLUCOSE: 103 MG/DL
HBA1C MFR BLD: 5.2 % (ref 4–6)
VITAMIN D 25-HYDROXY: 12.5 NG/ML (ref 30–100)

## 2025-06-29 ENCOUNTER — RESULTS FOLLOW-UP (OUTPATIENT)
Age: 60
End: 2025-06-29

## 2025-06-29 RX ORDER — ERGOCALCIFEROL 1.25 MG/1
50000 CAPSULE, LIQUID FILLED ORAL WEEKLY
Qty: 12 CAPSULE | Refills: 1 | Status: SHIPPED | OUTPATIENT
Start: 2025-06-29

## 2025-06-30 ENCOUNTER — CARE COORDINATION (OUTPATIENT)
Dept: CARE COORDINATION | Age: 60
End: 2025-06-30

## 2025-06-30 NOTE — CARE COORDINATION
ACM called patient for regular ACC out reach.  Patient stated it was not a good time and requested ACM call back tomorrow.

## 2025-07-01 ENCOUNTER — CARE COORDINATION (OUTPATIENT)
Dept: CARE COORDINATION | Age: 60
End: 2025-07-01

## 2025-07-01 NOTE — CARE COORDINATION
Ambulatory Care Coordination Note     2025 10:38 AM     Patient Current Location:  Home: [address] [place] [address] [place] [address]     ACM contacted the patient by telephone. Verified name and  with patient as identifiers.         ACM: Angy Selby RN     Challenges to be reviewed by the provider   Additional needs identified to be addressed with provider No                  Method of communication with provider: none.    Utilization: Patient has not had any utilization since our last call.     Care Summary Note: ACM spoke to patient she reports she has obtained all of her new medications and started taking them.  Patient reports Viibryd started working right away.  And she felt like her spirits were lifted within hours.  Patient seems confident she will be compliant with medications.  Patient denied any CP SOB or edema.  Patient has follow-up in 2 weeks for med check.  ACM and patient discussed keeping a notepad and write down daily how she is feeling overall.if  well or any side effects related to medications.  Patient verbalized understanding    Offered patient enrollment in the Remote Patient Monitoring (RPM) program for in-home monitoring: Yes, but did not enroll at this time: Declined.     Assessments Completed:   COPD Assessment    Does the patient understand envrionmental exposure?: Yes  Is the patient able to verbalize Rescue vs. Long Acting medications?: Yes  Does the patient have a nebulizer?: No  Does the patient use a space with inhaled medications?: No     No patient-reported symptoms         Symptoms:     Have you had a recent diagnosis of pneumonia either by PCP or at a hospital?: No          Medications Reviewed:   Reviewed picked up medications    Advance Care Planning:   Health Care Decision maker confirmed    Primary Decision Maker: cruzito pizano - [place] - [phone]    Primary Decision Maker: christi calderon - [place] - [phone]    Primary Decision Maker: German Gutierrez - [place] - [phone]

## 2025-07-08 ENCOUNTER — CARE COORDINATION (OUTPATIENT)
Dept: CARE COORDINATION | Age: 60
End: 2025-07-08

## 2025-07-15 ENCOUNTER — CARE COORDINATION (OUTPATIENT)
Dept: CARE COORDINATION | Age: 60
End: 2025-07-15

## 2025-07-15 ASSESSMENT — ENCOUNTER SYMPTOMS: DYSPNEA ASSOCIATED WITH: EXERTION

## 2025-07-15 NOTE — CARE COORDINATION
Ambulatory Care Coordination Note     7/15/2025 12:52 PM     Patient Current Location:  Home: [address] [place] [address] [place] [address]     Patient contacted the [facility] by telephone. Verified name and  with patient as identifiers.         ACM: Angy Selby RN     Challenges to be reviewed by the provider   Additional needs identified to be addressed with provider No                 Method of communication with provider: none.    Utilization: Patient has not had any utilization since our last call.     Care Summary Note: AC received a call back from patient.  She reports doing very well.  The new medication is working.  With no side effects.  Patient denies today any CP SOB or edema.  Patient made aware next [facility] outreach will be from coworker Mirna    Offered patient enrollment in the Remote Patient Monitoring (RPM) program for in-home monitoring: Yes, but did not enroll at this time: Already addressed.     Assessments Completed:   COPD Assessment    Does the patient understand envrionmental exposure?: Yes  Is the patient able to verbalize Rescue vs. Long Acting medications?: Yes  Does the patient have a nebulizer?: Yes  Does the patient use a space with inhaled medications?: No     No patient-reported symptoms         Symptoms:     Symptom course: stable  Breathlessness: exertion  Increase use of rapid acting/rescue inhaled medications?: No  Change in chronic cough?: No/At Baseline  Change in sputum?: No/At Baseline  Self Monitoring - SaO2: No  Have you had a recent diagnosis of pneumonia either by PCP or at a hospital?: No          Medications Reviewed:   Patient denies any changes with medications and reports taking all medications as prescribed.    Advance Care Planning:   Health Care Decision maker confirmed    Primary Decision Maker: cruzito pizano - [place] - [phone]    Primary Decision Maker: christi calderon - [place] - [phone]    Primary Decision Maker: German Gutierrez - [place] - [phone]      Care Planning:

## 2025-07-15 NOTE — CARE COORDINATION
Ambulatory Care Coordination Note     7/15/2025 8:55 AM     Patient outreach attempt by this ACM today to perform care management follow up . ACM was unable to reach the patient by telephone today;   left voice message requesting a return phone call to this ACM.     ACM: Angy Selby RN     Care Summary Note: 2nd attempt    PCP/Specialist follow up:   Future Appointments         Provider Specialty Dept Phone    7/21/2025 3:45 PM Michelle Fraire MD Obstetrics and Gynecology [phone]    8/1/2025 1:30 PM Zoie Dodd, PA-C Family Medicine [phone]            Follow Up:   Plan for next ACM outreach in approximately 2 weeks to complete:  - disease specific assessments  - advance care planning  - goal progression  - education   Care needs .

## 2025-07-30 ENCOUNTER — CARE COORDINATION (OUTPATIENT)
Dept: CARE COORDINATION | Age: 60
End: 2025-07-30

## 2025-08-01 ENCOUNTER — OFFICE VISIT (OUTPATIENT)
Age: 60
End: 2025-08-01

## 2025-08-01 VITALS
BODY MASS INDEX: 48.82 KG/M2 | RESPIRATION RATE: 18 BRPM | HEART RATE: 86 BPM | DIASTOLIC BLOOD PRESSURE: 80 MMHG | SYSTOLIC BLOOD PRESSURE: 130 MMHG | HEIGHT: 65 IN | WEIGHT: 293 LBS | OXYGEN SATURATION: 94 %

## 2025-08-01 DIAGNOSIS — J42 CHRONIC BRONCHITIS, UNSPECIFIED CHRONIC BRONCHITIS TYPE (HCC): Primary | ICD-10-CM

## 2025-08-01 DIAGNOSIS — N39.41 URGE INCONTINENCE OF URINE: ICD-10-CM

## 2025-08-01 DIAGNOSIS — L98.9 SKIN LESION: ICD-10-CM

## 2025-08-01 DIAGNOSIS — R73.09 ABNORMAL BLOOD SUGAR: ICD-10-CM

## 2025-08-01 DIAGNOSIS — F33.1 MODERATE EPISODE OF RECURRENT MAJOR DEPRESSIVE DISORDER (HCC): ICD-10-CM

## 2025-08-01 RX ORDER — OXYBUTYNIN CHLORIDE 15 MG/1
TABLET, EXTENDED RELEASE ORAL
Qty: 90 TABLET | Refills: 2 | Status: SHIPPED | OUTPATIENT
Start: 2025-08-01

## 2025-08-01 RX ORDER — FLUTICASONE PROPIONATE 110 UG/1
2 AEROSOL, METERED RESPIRATORY (INHALATION) 2 TIMES DAILY
Qty: 12 G | Refills: 2 | Status: SHIPPED | OUTPATIENT
Start: 2025-08-01

## 2025-08-01 ASSESSMENT — ENCOUNTER SYMPTOMS
ABDOMINAL DISTENTION: 0
COUGH: 0
ABDOMINAL PAIN: 0
SINUS PAIN: 0
COLOR CHANGE: 0
WHEEZING: 0
FACIAL SWELLING: 0
SINUS PRESSURE: 0
SHORTNESS OF BREATH: 0
CHEST TIGHTNESS: 0
BACK PAIN: 1

## 2025-08-01 NOTE — PROGRESS NOTES
Subjective  Yenny Negron, 60 y.o. female presents today with:  Chief Complaint   Patient presents with    Depression     1 month follow up on new medication states its working well for her     Other     Okay with maggy        History of Present Illness  The patient presents for evaluation of depression, urinary incontinence, diabetes, hyperlipidemia, skin lesion, weight management, and COPD.    She reports an improvement in her mood since starting Viibryd 10 mg. However, she experienced a day of irritability when she missed a dose due to being in a rush. She is currently satisfied with the 10 mg dosage and does not wish to increase it.    She is considering increasing her oxybutynin dosage as it has been less effective recently.   She is currently taking 10 mg xr daily.    She has resumed her Trulicity 0.75 mg treatment and is tolerating it well. She is open to increasing the dosage if it is covered by her insurance.    She is unsure about the timing of her Lipitor (atorvastatin) medication and is considering taking it during the day instead of at night. She forgets the nighttime dose.     She has a skin lesion that has been present for approximately 4 years. The lesion appears intermittently and does not heal completely.    She is interested in obtaining a scooter for mobility. She is also contemplating bariatric surgery but is hesitant due to concerns about excess skin post-surgery. She has been using apple cider vinegar to suppress her appetite, which she finds effective.    She did not receive a daily inhaler as previously prescribed. She has been using oxygen therapy for over 5 years, which was initiated during a hospital stay. She uses albuterol for shortness of breath during physical activity, such as walking from her car to the clinic.        Review of Systems   Constitutional:  Positive for activity change and fatigue. Negative for appetite change, chills, diaphoresis, fever and unexpected weight change.

## 2025-08-27 ENCOUNTER — CARE COORDINATION (OUTPATIENT)
Dept: CARE COORDINATION | Age: 60
End: 2025-08-27

## 2025-08-28 ENCOUNTER — CARE COORDINATION (OUTPATIENT)
Dept: CARE COORDINATION | Age: 60
End: 2025-08-28

## 2025-08-29 ENCOUNTER — CARE COORDINATION (OUTPATIENT)
Dept: CARE COORDINATION | Age: 60
End: 2025-08-29

## 2025-09-02 ENCOUNTER — CARE COORDINATION (OUTPATIENT)
Dept: CARE COORDINATION | Age: 60
End: 2025-09-02

## 2025-09-03 ENCOUNTER — CARE COORDINATION (OUTPATIENT)
Dept: CARE COORDINATION | Age: 60
End: 2025-09-03

## (undated) DEVICE — SUTURE VCRL SZ 0 L36IN ABSRB UD L36MM CT-1 1/2 CIR J946H

## (undated) DEVICE — GOWN,AURORA,NONREINFORCED,LARGE: Brand: MEDLINE

## (undated) DEVICE — 1842 FOAM BLOCK NEEDLE COUNTER: Brand: DEVON

## (undated) DEVICE — INTENDED FOR TISSUE SEPARATION, AND OTHER PROCEDURES THAT REQUIRE A SHARP SURGICAL BLADE TO PUNCTURE OR CUT.: Brand: BARD-PARKER ® CARBON RIB-BACK BLADES

## (undated) DEVICE — SMARTGOWN BREATHABLE SPECIALTY GOWN: Brand: CONVERTORS

## (undated) DEVICE — MEDI-VAC YANKAUER SUCTION HANDLE W/BULBOUS TIP: Brand: CARDINAL HEALTH

## (undated) DEVICE — MEDI-VAC NON-CONDUCTIVE SUCTION TUBING: Brand: CARDINAL HEALTH

## (undated) DEVICE — DRAPE, LAVH, STERILE: Brand: MEDLINE

## (undated) DEVICE — TRAY PREP DRY W/ PREM GLV 2 APPL 6 SPNG 2 UNDPD 1 OVERWRAP

## (undated) DEVICE — SYRINGE MED 10ML LUERLOCK TIP W/O SFTY DISP

## (undated) DEVICE — GLOVE SURG SZ 75 L12IN FNGR THK83MIL CRM POLYISOPRENE

## (undated) DEVICE — LABEL MED MINI W/ MARKER

## (undated) DEVICE — PACK,SET UP,DRAPE: Brand: MEDLINE

## (undated) DEVICE — NEEDLE HYPO 22GA L1 1/2IN PIVOTING SHLD FOR LUERLOCK SYR

## (undated) DEVICE — X-RAY DETECTABLE SPONGES,16 PLY: Brand: VISTEC

## (undated) DEVICE — PENCIL ES L3M BTTN SWCH HOLSTER W/ BLDE ELECTRD EDGE

## (undated) DEVICE — COVER LT HNDL BLU PLAS

## (undated) DEVICE — SUTURE VCRL + SZ 2-0 L27IN ABSRB WHT SH 1/2 CIR TAPERCUT VCP417H

## (undated) DEVICE — SYSTEM SURG HEMSTAT PWD 1 GM POLYSACCHARIDE HEMOSPHERES

## (undated) DEVICE — CATHETER URETH 16FR 5CC BLLN SIL ELASTMR F 2 W

## (undated) DEVICE — ELECTRODE PT RET AD L9FT HI MOIST COND ADH HYDRGEL CORDED